# Patient Record
Sex: MALE | Race: WHITE | NOT HISPANIC OR LATINO | Employment: OTHER | URBAN - METROPOLITAN AREA
[De-identification: names, ages, dates, MRNs, and addresses within clinical notes are randomized per-mention and may not be internally consistent; named-entity substitution may affect disease eponyms.]

---

## 2020-06-14 ENCOUNTER — TRANSFERRED RECORDS (OUTPATIENT)
Dept: HEALTH INFORMATION MANAGEMENT | Facility: CLINIC | Age: 80
End: 2020-06-14

## 2020-06-15 LAB — EJECTION FRACTION: 60 %

## 2020-06-17 LAB — EJECTION FRACTION: 60 %

## 2020-07-10 ENCOUNTER — TRANSFERRED RECORDS (OUTPATIENT)
Dept: FAMILY MEDICINE | Facility: CLINIC | Age: 80
End: 2020-07-10

## 2020-07-31 ENCOUNTER — TRANSFERRED RECORDS (OUTPATIENT)
Dept: HEALTH INFORMATION MANAGEMENT | Facility: CLINIC | Age: 80
End: 2020-07-31

## 2020-08-07 ENCOUNTER — TRANSFERRED RECORDS (OUTPATIENT)
Dept: FAMILY MEDICINE | Facility: CLINIC | Age: 80
End: 2020-08-07

## 2020-08-19 ENCOUNTER — TRANSFERRED RECORDS (OUTPATIENT)
Dept: FAMILY MEDICINE | Facility: CLINIC | Age: 80
End: 2020-08-19

## 2020-09-08 ENCOUNTER — TRANSFERRED RECORDS (OUTPATIENT)
Dept: FAMILY MEDICINE | Facility: CLINIC | Age: 80
End: 2020-09-08

## 2020-09-09 LAB — EJECTION FRACTION: 60 %

## 2020-10-19 ENCOUNTER — MEDICAL CORRESPONDENCE (OUTPATIENT)
Dept: HEALTH INFORMATION MANAGEMENT | Facility: CLINIC | Age: 80
End: 2020-10-19

## 2020-10-19 ENCOUNTER — TRANSFERRED RECORDS (OUTPATIENT)
Dept: HEALTH INFORMATION MANAGEMENT | Facility: CLINIC | Age: 80
End: 2020-10-19

## 2020-10-19 ENCOUNTER — TRANSFERRED RECORDS (OUTPATIENT)
Dept: SLEEP MEDICINE | Facility: CLINIC | Age: 80
End: 2020-10-19

## 2020-10-19 LAB
EJECTION FRACTION: 55 %

## 2021-03-19 ENCOUNTER — RECORDS - HEALTHEAST (OUTPATIENT)
Dept: LAB | Facility: CLINIC | Age: 81
End: 2021-03-19

## 2021-03-22 LAB
25(OH)D3 SERPL-MCNC: 24.2 NG/ML (ref 30–80)
ALBUMIN SERPL-MCNC: 3.3 G/DL (ref 3.5–5)
ALP SERPL-CCNC: 99 U/L (ref 45–120)
ALT SERPL W P-5'-P-CCNC: 19 U/L (ref 0–45)
ANION GAP SERPL CALCULATED.3IONS-SCNC: 7 MMOL/L (ref 5–18)
AST SERPL W P-5'-P-CCNC: 28 U/L (ref 0–40)
BASOPHILS # BLD AUTO: 0 THOU/UL (ref 0–0.2)
BASOPHILS NFR BLD AUTO: 1 % (ref 0–2)
BILIRUB DIRECT SERPL-MCNC: 0.2 MG/DL
BILIRUB SERPL-MCNC: 0.4 MG/DL (ref 0–1)
BUN SERPL-MCNC: 14 MG/DL (ref 8–28)
CALCIUM SERPL-MCNC: 9.4 MG/DL (ref 8.5–10.5)
CHLORIDE BLD-SCNC: 105 MMOL/L (ref 98–107)
CHOLEST SERPL-MCNC: 138 MG/DL
CO2 SERPL-SCNC: 29 MMOL/L (ref 22–31)
CREAT SERPL-MCNC: 0.69 MG/DL (ref 0.7–1.3)
EOSINOPHIL # BLD AUTO: 0.1 THOU/UL (ref 0–0.4)
EOSINOPHIL NFR BLD AUTO: 3 % (ref 0–6)
ERYTHROCYTE [DISTWIDTH] IN BLOOD BY AUTOMATED COUNT: 14.5 % (ref 11–14.5)
FASTING STATUS PATIENT QL REPORTED: NORMAL
GFR SERPL CREATININE-BSD FRML MDRD: >60 ML/MIN/1.73M2
GLUCOSE BLD-MCNC: 93 MG/DL (ref 70–125)
HCT VFR BLD AUTO: 38.1 % (ref 40–54)
HDLC SERPL-MCNC: 49 MG/DL
HGB BLD-MCNC: 11.8 G/DL (ref 14–18)
IMM GRANULOCYTES # BLD: 0 THOU/UL
IMM GRANULOCYTES NFR BLD: 0 %
LDLC SERPL CALC-MCNC: 74 MG/DL
LYMPHOCYTES # BLD AUTO: 0.7 THOU/UL (ref 0.8–4.4)
LYMPHOCYTES NFR BLD AUTO: 16 % (ref 20–40)
MCH RBC QN AUTO: 29 PG (ref 27–34)
MCHC RBC AUTO-ENTMCNC: 31 G/DL (ref 32–36)
MCV RBC AUTO: 94 FL (ref 80–100)
MONOCYTES # BLD AUTO: 0.4 THOU/UL (ref 0–0.9)
MONOCYTES NFR BLD AUTO: 9 % (ref 2–10)
NEUTROPHILS # BLD AUTO: 2.9 THOU/UL (ref 2–7.7)
NEUTROPHILS NFR BLD AUTO: 71 % (ref 50–70)
PLATELET # BLD AUTO: 233 THOU/UL (ref 140–440)
PMV BLD AUTO: 9.7 FL (ref 8.5–12.5)
POTASSIUM BLD-SCNC: 4 MMOL/L (ref 3.5–5)
PROT SERPL-MCNC: 7 G/DL (ref 6–8)
RBC # BLD AUTO: 4.07 MILL/UL (ref 4.4–6.2)
SODIUM SERPL-SCNC: 141 MMOL/L (ref 136–145)
TRIGL SERPL-MCNC: 76 MG/DL
TSH SERPL DL<=0.005 MIU/L-ACNC: 5.2 UIU/ML (ref 0.3–5)
WBC: 4 THOU/UL (ref 4–11)

## 2021-07-04 ENCOUNTER — RECORDS - HEALTHEAST (OUTPATIENT)
Dept: LAB | Facility: CLINIC | Age: 81
End: 2021-07-04

## 2021-07-06 LAB
ANION GAP SERPL CALCULATED.3IONS-SCNC: 8 MMOL/L (ref 5–18)
BUN SERPL-MCNC: 15 MG/DL (ref 8–28)
CALCIUM SERPL-MCNC: 9.7 MG/DL (ref 8.5–10.5)
CHLORIDE BLD-SCNC: 103 MMOL/L (ref 98–107)
CO2 SERPL-SCNC: 28 MMOL/L (ref 22–31)
CREAT SERPL-MCNC: 0.81 MG/DL (ref 0.7–1.3)
ERYTHROCYTE [DISTWIDTH] IN BLOOD BY AUTOMATED COUNT: 15.2 % (ref 11–14.5)
GFR SERPL CREATININE-BSD FRML MDRD: >60 ML/MIN/1.73M2
GLUCOSE BLD-MCNC: 81 MG/DL (ref 70–125)
HCT VFR BLD AUTO: 37.3 % (ref 40–54)
HGB BLD-MCNC: 11.6 G/DL (ref 14–18)
MCH RBC QN AUTO: 29.7 PG (ref 27–34)
MCHC RBC AUTO-ENTMCNC: 31.1 G/DL (ref 32–36)
MCV RBC AUTO: 95 FL (ref 80–100)
PLATELET # BLD AUTO: 192 THOU/UL (ref 140–440)
PMV BLD AUTO: 9.9 FL (ref 8.5–12.5)
POTASSIUM BLD-SCNC: 4.2 MMOL/L (ref 3.5–5)
RBC # BLD AUTO: 3.91 MILL/UL (ref 4.4–6.2)
SODIUM SERPL-SCNC: 139 MMOL/L (ref 136–145)
WBC: 4.5 THOU/UL (ref 4–11)

## 2021-07-12 ENCOUNTER — LAB REQUISITION (OUTPATIENT)
Dept: LAB | Facility: CLINIC | Age: 81
End: 2021-07-12
Payer: MEDICARE

## 2021-07-12 DIAGNOSIS — D64.9 ANEMIA, UNSPECIFIED: ICD-10-CM

## 2021-07-12 DIAGNOSIS — N18.9 CHRONIC KIDNEY DISEASE, UNSPECIFIED: ICD-10-CM

## 2021-07-12 DIAGNOSIS — I10 ESSENTIAL (PRIMARY) HYPERTENSION: ICD-10-CM

## 2021-07-13 LAB
ERYTHROCYTE [DISTWIDTH] IN BLOOD BY AUTOMATED COUNT: 15.1 % (ref 10–15)
HCT VFR BLD AUTO: 36.4 % (ref 40–53)
HGB BLD-MCNC: 11.3 G/DL (ref 13.3–17.7)
MCH RBC QN AUTO: 29.5 PG (ref 26.5–33)
MCHC RBC AUTO-ENTMCNC: 31 G/DL (ref 31.5–36.5)
MCV RBC AUTO: 95 FL (ref 78–100)
PLATELET # BLD AUTO: 180 10E3/UL (ref 150–450)
RBC # BLD AUTO: 3.83 10E6/UL (ref 4.4–5.9)
WBC # BLD AUTO: 4.3 10E3/UL (ref 4–11)

## 2021-07-13 PROCEDURE — P9604 ONE-WAY ALLOW PRORATED TRIP: HCPCS | Mod: ORL | Performed by: INTERNAL MEDICINE

## 2021-07-13 PROCEDURE — 85027 COMPLETE CBC AUTOMATED: CPT | Mod: ORL | Performed by: INTERNAL MEDICINE

## 2021-07-13 PROCEDURE — 36415 COLL VENOUS BLD VENIPUNCTURE: CPT | Mod: ORL | Performed by: INTERNAL MEDICINE

## 2021-07-14 ENCOUNTER — LAB REQUISITION (OUTPATIENT)
Dept: LAB | Facility: CLINIC | Age: 81
End: 2021-07-14
Payer: MEDICARE

## 2021-07-14 DIAGNOSIS — D64.9 ANEMIA, UNSPECIFIED: ICD-10-CM

## 2021-07-14 DIAGNOSIS — E03.9 HYPOTHYROIDISM, UNSPECIFIED: ICD-10-CM

## 2021-07-14 DIAGNOSIS — N18.9 CHRONIC KIDNEY DISEASE, UNSPECIFIED: ICD-10-CM

## 2021-07-14 DIAGNOSIS — I10 ESSENTIAL (PRIMARY) HYPERTENSION: ICD-10-CM

## 2021-07-15 PROCEDURE — P9604 ONE-WAY ALLOW PRORATED TRIP: HCPCS | Mod: ORL | Performed by: INTERNAL MEDICINE

## 2021-07-16 LAB
ALBUMIN SERPL-MCNC: 3.3 G/DL (ref 3.5–5)
ALP SERPL-CCNC: 108 U/L (ref 45–120)
ALT SERPL W P-5'-P-CCNC: 18 U/L (ref 0–45)
ANION GAP SERPL CALCULATED.3IONS-SCNC: 8 MMOL/L (ref 5–18)
AST SERPL W P-5'-P-CCNC: 26 U/L (ref 0–40)
BASOPHILS # BLD AUTO: 0 10E3/UL (ref 0–0.2)
BASOPHILS NFR BLD AUTO: 1 %
BILIRUB DIRECT SERPL-MCNC: 0.3 MG/DL
BILIRUB SERPL-MCNC: 0.6 MG/DL (ref 0–1)
BUN SERPL-MCNC: 14 MG/DL (ref 8–28)
CALCIUM SERPL-MCNC: 9.8 MG/DL (ref 8.5–10.5)
CHLORIDE BLD-SCNC: 103 MMOL/L (ref 98–107)
CO2 SERPL-SCNC: 29 MMOL/L (ref 22–31)
CREAT SERPL-MCNC: 0.73 MG/DL (ref 0.7–1.3)
EOSINOPHIL # BLD AUTO: 0.1 10E3/UL (ref 0–0.7)
EOSINOPHIL NFR BLD AUTO: 2 %
ERYTHROCYTE [DISTWIDTH] IN BLOOD BY AUTOMATED COUNT: 15 % (ref 10–15)
GFR SERPL CREATININE-BSD FRML MDRD: 86 ML/MIN/1.73M2
GLUCOSE BLD-MCNC: 101 MG/DL (ref 70–125)
HCT VFR BLD AUTO: 38.3 % (ref 40–53)
HGB BLD-MCNC: 12 G/DL (ref 13.3–17.7)
IMM GRANULOCYTES # BLD: 0 10E3/UL
IMM GRANULOCYTES NFR BLD: 0 %
LYMPHOCYTES # BLD AUTO: 1 10E3/UL (ref 0.8–5.3)
LYMPHOCYTES NFR BLD AUTO: 23 %
MCH RBC QN AUTO: 30.1 PG (ref 26.5–33)
MCHC RBC AUTO-ENTMCNC: 31.3 G/DL (ref 31.5–36.5)
MCV RBC AUTO: 96 FL (ref 78–100)
MONOCYTES # BLD AUTO: 0.6 10E3/UL (ref 0–1.3)
MONOCYTES NFR BLD AUTO: 13 %
NEUTROPHILS # BLD AUTO: 2.8 10E3/UL (ref 1.6–8.3)
NEUTROPHILS NFR BLD AUTO: 61 %
NRBC # BLD AUTO: 0 10E3/UL
NRBC BLD AUTO-RTO: 0 /100
PLATELET # BLD AUTO: 194 10E3/UL (ref 150–450)
POTASSIUM BLD-SCNC: 4.3 MMOL/L (ref 3.5–5)
PROT SERPL-MCNC: 6.7 G/DL (ref 6–8)
RBC # BLD AUTO: 3.99 10E6/UL (ref 4.4–5.9)
SODIUM SERPL-SCNC: 140 MMOL/L (ref 136–145)
TSH SERPL DL<=0.005 MIU/L-ACNC: 3.66 UIU/ML (ref 0.3–5)
WBC # BLD AUTO: 4.5 10E3/UL (ref 4–11)

## 2021-07-16 PROCEDURE — 84443 ASSAY THYROID STIM HORMONE: CPT | Mod: ORL | Performed by: INTERNAL MEDICINE

## 2021-07-16 PROCEDURE — 36415 COLL VENOUS BLD VENIPUNCTURE: CPT | Mod: ORL | Performed by: INTERNAL MEDICINE

## 2021-07-16 PROCEDURE — 36415 COLL VENOUS BLD VENIPUNCTURE: CPT | Mod: ORL

## 2021-07-16 PROCEDURE — 82248 BILIRUBIN DIRECT: CPT | Mod: ORL | Performed by: INTERNAL MEDICINE

## 2021-07-16 PROCEDURE — 85025 COMPLETE CBC W/AUTO DIFF WBC: CPT | Mod: ORL | Performed by: INTERNAL MEDICINE

## 2021-07-16 PROCEDURE — P9604 ONE-WAY ALLOW PRORATED TRIP: HCPCS | Mod: ORL | Performed by: INTERNAL MEDICINE

## 2021-07-16 PROCEDURE — 80053 COMPREHEN METABOLIC PANEL: CPT | Mod: ORL | Performed by: INTERNAL MEDICINE

## 2021-08-20 ENCOUNTER — LAB REQUISITION (OUTPATIENT)
Dept: LAB | Facility: CLINIC | Age: 81
End: 2021-08-20
Payer: MEDICARE

## 2021-08-20 DIAGNOSIS — E55.9 VITAMIN D DEFICIENCY, UNSPECIFIED: ICD-10-CM

## 2021-08-23 PROCEDURE — 36415 COLL VENOUS BLD VENIPUNCTURE: CPT | Mod: ORL | Performed by: NURSE PRACTITIONER

## 2021-08-23 PROCEDURE — 82306 VITAMIN D 25 HYDROXY: CPT | Mod: ORL | Performed by: NURSE PRACTITIONER

## 2021-08-23 PROCEDURE — P9603 ONE-WAY ALLOW PRORATED MILES: HCPCS | Mod: ORL | Performed by: NURSE PRACTITIONER

## 2021-08-24 LAB — DEPRECATED CALCIDIOL+CALCIFEROL SERPL-MC: 51 UG/L (ref 30–80)

## 2022-01-03 ENCOUNTER — LAB REQUISITION (OUTPATIENT)
Dept: LAB | Facility: CLINIC | Age: 82
End: 2022-01-03
Payer: MEDICARE

## 2022-01-03 DIAGNOSIS — I10 ESSENTIAL (PRIMARY) HYPERTENSION: ICD-10-CM

## 2022-01-05 ENCOUNTER — LAB REQUISITION (OUTPATIENT)
Dept: LAB | Facility: CLINIC | Age: 82
End: 2022-01-05
Payer: MEDICARE

## 2022-01-05 DIAGNOSIS — I10 ESSENTIAL (PRIMARY) HYPERTENSION: ICD-10-CM

## 2022-01-05 LAB
ANION GAP SERPL CALCULATED.3IONS-SCNC: 6 MMOL/L (ref 5–18)
BUN SERPL-MCNC: 23 MG/DL (ref 8–28)
CALCIUM SERPL-MCNC: 9.8 MG/DL (ref 8.5–10.5)
CHLORIDE BLD-SCNC: 104 MMOL/L (ref 98–107)
CO2 SERPL-SCNC: 28 MMOL/L (ref 22–31)
CREAT SERPL-MCNC: 0.92 MG/DL (ref 0.7–1.3)
GFR SERPL CREATININE-BSD FRML MDRD: 83 ML/MIN/1.73M2
GLUCOSE BLD-MCNC: 100 MG/DL (ref 70–125)
POTASSIUM BLD-SCNC: 5 MMOL/L (ref 3.5–5)
SODIUM SERPL-SCNC: 138 MMOL/L (ref 136–145)

## 2022-01-05 PROCEDURE — 36415 COLL VENOUS BLD VENIPUNCTURE: CPT | Mod: ORL | Performed by: INTERNAL MEDICINE

## 2022-01-05 PROCEDURE — 80048 BASIC METABOLIC PNL TOTAL CA: CPT | Mod: ORL | Performed by: INTERNAL MEDICINE

## 2022-01-05 PROCEDURE — P9604 ONE-WAY ALLOW PRORATED TRIP: HCPCS | Mod: ORL | Performed by: INTERNAL MEDICINE

## 2022-02-09 ENCOUNTER — DOCUMENTATION ONLY (OUTPATIENT)
Dept: OTHER | Facility: CLINIC | Age: 82
End: 2022-02-09

## 2022-03-21 ENCOUNTER — OFFICE VISIT (OUTPATIENT)
Dept: NEUROLOGY | Facility: CLINIC | Age: 82
End: 2022-03-21
Payer: MEDICARE

## 2022-03-21 VITALS
BODY MASS INDEX: 29.82 KG/M2 | HEIGHT: 71 IN | HEART RATE: 58 BPM | WEIGHT: 213 LBS | DIASTOLIC BLOOD PRESSURE: 59 MMHG | SYSTOLIC BLOOD PRESSURE: 134 MMHG

## 2022-03-21 DIAGNOSIS — Z87.820 HX OF TRAUMATIC BRAIN INJURY: ICD-10-CM

## 2022-03-21 DIAGNOSIS — R41.89 SUBJECTIVE MEMORY COMPLAINTS: Primary | ICD-10-CM

## 2022-03-21 PROCEDURE — 99205 OFFICE O/P NEW HI 60 MIN: CPT | Performed by: PSYCHIATRY & NEUROLOGY

## 2022-03-21 RX ORDER — NITROGLYCERIN 0.4 MG/1
0.4 TABLET SUBLINGUAL EVERY 5 MIN PRN
COMMUNITY
Start: 2021-04-23 | End: 2023-01-15

## 2022-03-21 RX ORDER — OXYCODONE 9 MG/1
CAPSULE, EXTENDED RELEASE ORAL
COMMUNITY
Start: 2022-03-18 | End: 2022-07-07

## 2022-03-21 RX ORDER — FUROSEMIDE 20 MG
20 TABLET ORAL DAILY
COMMUNITY
Start: 2022-03-14 | End: 2022-07-07

## 2022-03-21 RX ORDER — LANOLIN ALCOHOL/MO/W.PET/CERES
3 CREAM (GRAM) TOPICAL AT BEDTIME
COMMUNITY
Start: 2022-03-14 | End: 2022-07-27

## 2022-03-21 RX ORDER — NYSTATIN 100000 [USP'U]/G
POWDER TOPICAL 2 TIMES DAILY
COMMUNITY
Start: 2021-09-27

## 2022-03-21 RX ORDER — OLMESARTAN MEDOXOMIL 20 MG/1
20 TABLET ORAL DAILY
COMMUNITY
Start: 2022-03-14 | End: 2022-07-07

## 2022-03-21 RX ORDER — CHOLECALCIFEROL (VITAMIN D3) 50 MCG
2 TABLET ORAL DAILY
COMMUNITY
Start: 2022-03-14 | End: 2023-11-17

## 2022-03-21 RX ORDER — TRAZODONE HYDROCHLORIDE 100 MG/1
100 TABLET ORAL AT BEDTIME
COMMUNITY
Start: 2022-03-14 | End: 2023-08-02

## 2022-03-21 RX ORDER — OXYCODONE 13.5 MG/1
CAPSULE, EXTENDED RELEASE ORAL
COMMUNITY
Start: 2022-03-18 | End: 2022-07-08

## 2022-03-21 RX ORDER — ATORVASTATIN CALCIUM 40 MG/1
60 TABLET, FILM COATED ORAL DAILY
COMMUNITY
Start: 2022-03-14 | End: 2022-09-29

## 2022-03-21 RX ORDER — LEVOTHYROXINE SODIUM 50 UG/1
50 TABLET ORAL DAILY
COMMUNITY
Start: 2022-03-14 | End: 2022-07-07

## 2022-03-21 RX ORDER — CLOPIDOGREL BISULFATE 75 MG/1
75 TABLET ORAL DAILY
COMMUNITY
Start: 2022-03-14 | End: 2022-07-18

## 2022-03-21 RX ORDER — BUSPIRONE HYDROCHLORIDE 7.5 MG/1
TABLET ORAL
COMMUNITY
Start: 2021-08-08 | End: 2022-05-16

## 2022-03-21 ASSESSMENT — MONTREAL COGNITIVE ASSESSMENT (MOCA)
WHAT LEVEL OF EDUCATION WAS ATTAINED: 0
12. MEMORY INDEX SCORE: 0
13. ORIENTATION SUBSCORE: 3
4. NAME EACH OF THE THREE ANIMALS SHOWN: 3
8. SERIAL SUBTRACTION OF 7S: 1
11. FOR EACH PAIR OF WORDS, WHAT CATEGORY DO THEY BELONG TO (OUT OF 2): 2
7. [VIGILENCE] TAP WHEN HEARING DESIGNATED LETTER: 1
10. [FLUENCY] NAME WORDS STARTING WITH DESIGNATED LETTER: 1
6. READ LIST OF DIGITS [FORWARD/BACKWARD]: 2
9. REPEAT EACH SENTENCE: 2
WHAT IS THE TOTAL SCORE (OUT OF 30): 18
VISUOSPATIAL/EXECUTIVE SUBSCORE: 3

## 2022-03-21 NOTE — NURSING NOTE
ENRRIQUE COGNITIVE ASSESSMENT (MOCA)  Version 7.1 Original Version  VISUOSPATIAL/EXECUTIVE               COPY CUBE      [    ]                                [ 1   ] DRAW CLOCK (Ten past eleven)  (3 points)    [    ]                    [   1 ]               [  1  ]       Contour            Numbers     Hands POINTS                   3/ 5   NAMING    [ 1  ]                                                                        [  1  ]                                             [  1  ]  Lidave Villarreal                                Camel                   3  / 3   MEMORY Read list of words, subject must repeat them. Do 2 trials, even if 1st trial is successful. Do a recall after 5 minutes  FACE VELVET Bahai ROSALIO RED No Points    1st          2nd         ATTENTION Read list of digits (1 digit/sec) Subject has to repeat in the forward order       [ 1   ]   2  1  8  5  4                                [  1  ] 7 4 2                          2/2   Read list of letters. The subject must tap with his hand at each letter A. No points if > 2 errors.  [    ] F B A C M N A A J K L B A F A K D E A A A J A M O F A A B             1 /1   Serial 7 subtraction starting at 100          [  1  ] 93         [    ] 86          [    ] 79          [    ] 72         [    ] 65   4 or 5 correct subtractions: 3 points,  2 or 3 correct: 2 points,  1correct: 1 point,   0 correct: 0 points           1 /3   LANGUAGE Repeat: I only know that Ramón is the one to help today. [ 1    ]                                      The cat always hid under the couch when dogs were in the room. [  1 ]              2 /2   Fluency: Name maximum number of words in one minute that begin with the letter F                                                                                                                    [    ] 12___ (N > 11 words)             1  /1   ABSTRACTION Similarity between  e.g. banana-orange=fruit                                                                   [1    ] train-bicycle                      [  1  ] watch-ruler            2  /2   DELAYED  RECALL Has to recall words  WITH NO CUE FACE  [    ] VELVET  [    ] Mandaeism  [    ]  ROSALIO  [    ] RED  [    ] Points for UNCUED recall only          0  /5           OPTIONAL Category cue           Multiple choice cue          ORIENTATION  [    ] Date     [ 1   ] Month       [  1  ] Year      [  1  ] Day      [    ] Place        [    ] City        3  /6   TOTAL  Normal > 26/30 Add 1 point if < 12 years education     18 /30
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
## 2022-03-25 ENCOUNTER — LAB REQUISITION (OUTPATIENT)
Dept: LAB | Facility: CLINIC | Age: 82
End: 2022-03-25
Payer: MEDICARE

## 2022-03-25 DIAGNOSIS — R41.89 OTHER SYMPTOMS AND SIGNS INVOLVING COGNITIVE FUNCTIONS AND AWARENESS: ICD-10-CM

## 2022-03-25 DIAGNOSIS — Z87.820 PERSONAL HISTORY OF TRAUMATIC BRAIN INJURY: ICD-10-CM

## 2022-03-28 LAB
T4 FREE SERPL-MCNC: 0.84 NG/DL (ref 0.7–1.8)
TSH SERPL DL<=0.005 MIU/L-ACNC: 4.09 UIU/ML (ref 0.3–5)
VIT B12 SERPL-MCNC: 580 PG/ML (ref 213–816)

## 2022-03-28 PROCEDURE — 84443 ASSAY THYROID STIM HORMONE: CPT | Mod: ORL | Performed by: PSYCHIATRY & NEUROLOGY

## 2022-03-28 PROCEDURE — 84439 ASSAY OF FREE THYROXINE: CPT | Mod: ORL | Performed by: PSYCHIATRY & NEUROLOGY

## 2022-03-28 PROCEDURE — P9603 ONE-WAY ALLOW PRORATED MILES: HCPCS | Mod: ORL | Performed by: PSYCHIATRY & NEUROLOGY

## 2022-03-28 PROCEDURE — 36415 COLL VENOUS BLD VENIPUNCTURE: CPT | Mod: ORL | Performed by: PSYCHIATRY & NEUROLOGY

## 2022-03-28 PROCEDURE — 82607 VITAMIN B-12: CPT | Mod: ORL | Performed by: PSYCHIATRY & NEUROLOGY

## 2022-03-29 ENCOUNTER — TELEPHONE (OUTPATIENT)
Dept: NEUROLOGY | Facility: CLINIC | Age: 82
End: 2022-03-29
Payer: MEDICARE

## 2022-03-30 NOTE — TELEPHONE ENCOUNTER
Spoke to son Santo and he will have his dad go to a  [facility] or [facility] to have remaining labs done.  Dayan Soriano MA on 3/30/2022 at 10:07 AM

## 2022-03-31 ENCOUNTER — LAB (OUTPATIENT)
Dept: LAB | Facility: CLINIC | Age: 82
End: 2022-03-31
Payer: MEDICARE

## 2022-03-31 DIAGNOSIS — R41.89 SUBJECTIVE MEMORY COMPLAINTS: ICD-10-CM

## 2022-03-31 DIAGNOSIS — Z87.820 HX OF TRAUMATIC BRAIN INJURY: ICD-10-CM

## 2022-03-31 LAB
T4 FREE SERPL-MCNC: 0.88 NG/DL (ref 0.76–1.46)
TSH SERPL DL<=0.005 MIU/L-ACNC: 4.5 MU/L (ref 0.4–4)
VIT B12 SERPL-MCNC: 628 PG/ML (ref 193–986)

## 2022-03-31 PROCEDURE — 83921 ORGANIC ACID SINGLE QUANT: CPT

## 2022-03-31 PROCEDURE — 36415 COLL VENOUS BLD VENIPUNCTURE: CPT

## 2022-03-31 PROCEDURE — 82607 VITAMIN B-12: CPT

## 2022-03-31 PROCEDURE — 84425 ASSAY OF VITAMIN B-1: CPT | Mod: 90

## 2022-03-31 PROCEDURE — 84439 ASSAY OF FREE THYROXINE: CPT

## 2022-03-31 PROCEDURE — 84443 ASSAY THYROID STIM HORMONE: CPT

## 2022-04-05 LAB — VIT B1 PYROPHOSHATE BLD-SCNC: 126 NMOL/L

## 2022-04-06 LAB — METHYLMALONATE SERPL-SCNC: 0.29 UMOL/L (ref 0–0.4)

## 2022-04-19 ENCOUNTER — LAB REQUISITION (OUTPATIENT)
Dept: LAB | Facility: CLINIC | Age: 82
End: 2022-04-19
Payer: MEDICARE

## 2022-04-19 DIAGNOSIS — Z95.2 PRESENCE OF PROSTHETIC HEART VALVE: ICD-10-CM

## 2022-04-19 DIAGNOSIS — Z95.1 PRESENCE OF AORTOCORONARY BYPASS GRAFT: ICD-10-CM

## 2022-04-21 LAB
ALBUMIN SERPL-MCNC: 3.5 G/DL (ref 3.5–5)
ALP SERPL-CCNC: 85 U/L (ref 45–120)
ALT SERPL W P-5'-P-CCNC: 19 U/L (ref 0–45)
ANION GAP SERPL CALCULATED.3IONS-SCNC: 10 MMOL/L (ref 5–18)
AST SERPL W P-5'-P-CCNC: 25 U/L (ref 0–40)
BILIRUB DIRECT SERPL-MCNC: 0.2 MG/DL
BILIRUB SERPL-MCNC: 0.6 MG/DL (ref 0–1)
BUN SERPL-MCNC: 18 MG/DL (ref 8–28)
CALCIUM SERPL-MCNC: 9.7 MG/DL (ref 8.5–10.5)
CHLORIDE BLD-SCNC: 102 MMOL/L (ref 98–107)
CO2 SERPL-SCNC: 29 MMOL/L (ref 22–31)
CREAT SERPL-MCNC: 0.85 MG/DL (ref 0.7–1.3)
DEPRECATED CALCIDIOL+CALCIFEROL SERPL-MC: 50 UG/L (ref 20–75)
ERYTHROCYTE [DISTWIDTH] IN BLOOD BY AUTOMATED COUNT: 14.4 % (ref 10–15)
GFR SERPL CREATININE-BSD FRML MDRD: 86 ML/MIN/1.73M2
GLUCOSE BLD-MCNC: 125 MG/DL (ref 70–125)
HCT VFR BLD AUTO: 44.8 % (ref 40–53)
HGB BLD-MCNC: 14 G/DL (ref 13.3–17.7)
MCH RBC QN AUTO: 32 PG (ref 26.5–33)
MCHC RBC AUTO-ENTMCNC: 31.3 G/DL (ref 31.5–36.5)
MCV RBC AUTO: 102 FL (ref 78–100)
PLATELET # BLD AUTO: 198 10E3/UL (ref 150–450)
POTASSIUM BLD-SCNC: 3.9 MMOL/L (ref 3.5–5)
PROT SERPL-MCNC: 6.8 G/DL (ref 6–8)
RBC # BLD AUTO: 4.38 10E6/UL (ref 4.4–5.9)
SODIUM SERPL-SCNC: 141 MMOL/L (ref 136–145)
TSH SERPL DL<=0.005 MIU/L-ACNC: 3.76 UIU/ML (ref 0.3–5)
WBC # BLD AUTO: 6.1 10E3/UL (ref 4–11)

## 2022-04-21 PROCEDURE — 36415 COLL VENOUS BLD VENIPUNCTURE: CPT | Mod: ORL | Performed by: NURSE PRACTITIONER

## 2022-04-21 PROCEDURE — 84443 ASSAY THYROID STIM HORMONE: CPT | Mod: ORL | Performed by: NURSE PRACTITIONER

## 2022-04-21 PROCEDURE — 85027 COMPLETE CBC AUTOMATED: CPT | Mod: ORL | Performed by: NURSE PRACTITIONER

## 2022-04-21 PROCEDURE — 82306 VITAMIN D 25 HYDROXY: CPT | Mod: ORL | Performed by: NURSE PRACTITIONER

## 2022-04-21 PROCEDURE — 82248 BILIRUBIN DIRECT: CPT | Mod: ORL | Performed by: NURSE PRACTITIONER

## 2022-04-21 PROCEDURE — P9604 ONE-WAY ALLOW PRORATED TRIP: HCPCS | Mod: ORL | Performed by: NURSE PRACTITIONER

## 2022-04-23 ENCOUNTER — HOSPITAL ENCOUNTER (OUTPATIENT)
Dept: MRI IMAGING | Facility: HOSPITAL | Age: 82
Discharge: HOME | End: 2022-04-23
Attending: PSYCHIATRY & NEUROLOGY | Admitting: PSYCHIATRY & NEUROLOGY
Payer: MEDICARE

## 2022-04-23 DIAGNOSIS — Z87.820 HX OF TRAUMATIC BRAIN INJURY: ICD-10-CM

## 2022-04-23 DIAGNOSIS — R41.89 SUBJECTIVE MEMORY COMPLAINTS: ICD-10-CM

## 2022-04-23 PROCEDURE — 70551 MRI BRAIN STEM W/O DYE: CPT

## 2022-04-28 ENCOUNTER — TELEPHONE (OUTPATIENT)
Dept: NEUROLOGY | Facility: CLINIC | Age: 82
End: 2022-04-28
Payer: MEDICARE

## 2022-04-28 DIAGNOSIS — I63.9 CEREBROVASCULAR ACCIDENT (CVA), UNSPECIFIED MECHANISM (H): Primary | ICD-10-CM

## 2022-04-28 RX ORDER — ASPIRIN 81 MG/1
81 TABLET, CHEWABLE ORAL DAILY
Qty: 90 TABLET | Refills: 1 | Status: SHIPPED | OUTPATIENT
Start: 2022-04-28 | End: 2022-04-28

## 2022-04-28 RX ORDER — ASPIRIN 81 MG/1
81 TABLET, CHEWABLE ORAL DAILY
Qty: 90 TABLET | Refills: 1 | Status: SHIPPED | OUTPATIENT
Start: 2022-04-28

## 2022-04-28 NOTE — TELEPHONE ENCOUNTER
[facility] Call Center    Phone Message    May a detailed message be left on voicemail: yes     Reason for Call: Other: Pt long term care facility calling in the order needs to go to [facility] in [place], please resend order to correct location      Action Taken: Message routed to:  Other: [facility] neuro    Travel Screening: Not Applicable

## 2022-04-28 NOTE — TELEPHONE ENCOUNTER
Spoke to pt's son ans they will need and order for the pt to take his aspirin at his long term care facility. Can you send the aspirin to the pharmacy with an order.   Dayan Soriano MA on 4/28/2022 at 8:44 AM

## 2022-05-03 ENCOUNTER — OFFICE VISIT (OUTPATIENT)
Dept: FAMILY MEDICINE | Facility: CLINIC | Age: 82
End: 2022-05-03
Payer: MEDICARE

## 2022-05-03 VITALS
OXYGEN SATURATION: 96 % | BODY MASS INDEX: 30.24 KG/M2 | SYSTOLIC BLOOD PRESSURE: 122 MMHG | WEIGHT: 216 LBS | HEART RATE: 66 BPM | DIASTOLIC BLOOD PRESSURE: 60 MMHG | HEIGHT: 71 IN | TEMPERATURE: 98.3 F

## 2022-05-03 DIAGNOSIS — Z95.2 S/P TAVR (TRANSCATHETER AORTIC VALVE REPLACEMENT): ICD-10-CM

## 2022-05-03 DIAGNOSIS — I25.718 CORONARY ARTERY DISEASE OF AUTOLOGOUS VEIN BYPASS GRAFT WITH STABLE ANGINA PECTORIS (H): Primary | ICD-10-CM

## 2022-05-03 DIAGNOSIS — I20.89 STABLE ANGINA (H): ICD-10-CM

## 2022-05-03 DIAGNOSIS — Z95.5 PRESENCE OF CORONARY ARTERY BYPASS GRAFT STENT: ICD-10-CM

## 2022-05-03 DIAGNOSIS — E03.4 HYPOTHYROIDISM DUE TO ACQUIRED ATROPHY OF THYROID: ICD-10-CM

## 2022-05-03 DIAGNOSIS — F33.41 RECURRENT MAJOR DEPRESSIVE DISORDER, IN PARTIAL REMISSION (H): ICD-10-CM

## 2022-05-03 DIAGNOSIS — F11.90 CHRONIC, CONTINUOUS USE OF OPIOIDS: ICD-10-CM

## 2022-05-03 DIAGNOSIS — F41.1 GENERALIZED ANXIETY DISORDER: ICD-10-CM

## 2022-05-03 DIAGNOSIS — C61 MALIGNANT TUMOR OF PROSTATE (H): ICD-10-CM

## 2022-05-03 DIAGNOSIS — G47.33 OSA (OBSTRUCTIVE SLEEP APNEA): ICD-10-CM

## 2022-05-03 DIAGNOSIS — Z95.1 PRESENCE OF CORONARY ARTERY BYPASS GRAFT STENT: ICD-10-CM

## 2022-05-03 PROCEDURE — 99204 OFFICE O/P NEW MOD 45 MIN: CPT | Performed by: FAMILY MEDICINE

## 2022-05-03 RX ORDER — OXYCODONE HYDROCHLORIDE 5 MG/1
2.5 TABLET ORAL AT BEDTIME
COMMUNITY
Start: 2022-04-14 | End: 2022-07-07

## 2022-05-03 RX ORDER — LEVOTHYROXINE SODIUM 50 UG/1
50 TABLET ORAL
COMMUNITY
End: 2022-05-16

## 2022-05-03 RX ORDER — SERTRALINE HYDROCHLORIDE 100 MG/1
100 TABLET, FILM COATED ORAL DAILY
COMMUNITY
Start: 2021-09-29 | End: 2022-09-29

## 2022-05-03 RX ORDER — NYSTATIN 100000 [USP'U]/G
POWDER TOPICAL
COMMUNITY
Start: 2021-09-27 | End: 2022-05-16

## 2022-05-03 RX ORDER — NITROGLYCERIN 0.4 MG/1
0.4 TABLET SUBLINGUAL EVERY 5 MIN PRN
COMMUNITY
End: 2022-05-18

## 2022-05-03 RX ORDER — ISOSORBIDE MONONITRATE 60 MG/1
TABLET, EXTENDED RELEASE ORAL
COMMUNITY
End: 2022-05-16

## 2022-05-03 RX ORDER — ISOSORBIDE MONONITRATE 30 MG/1
TABLET, EXTENDED RELEASE ORAL
COMMUNITY
End: 2022-05-16
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
## 2022-05-06 PROBLEM — Z95.5 PRESENCE OF CORONARY ARTERY BYPASS GRAFT STENT: Status: ACTIVE | Noted: 2022-05-06

## 2022-05-06 PROBLEM — G47.33 OSA (OBSTRUCTIVE SLEEP APNEA): Status: ACTIVE | Noted: 2022-05-06

## 2022-05-06 PROBLEM — Z95.1 PRESENCE OF CORONARY ARTERY BYPASS GRAFT STENT: Status: ACTIVE | Noted: 2022-05-06

## 2022-05-06 PROBLEM — F33.41 RECURRENT MAJOR DEPRESSIVE DISORDER, IN PARTIAL REMISSION (H): Status: ACTIVE | Noted: 2022-05-06

## 2022-05-07 ENCOUNTER — HEALTH MAINTENANCE LETTER (OUTPATIENT)
Age: 82
End: 2022-05-07

## 2022-05-16 ENCOUNTER — HOSPITAL ENCOUNTER (EMERGENCY)
Facility: CLINIC | Age: 82
Discharge: HOME | End: 2022-05-16
Attending: FAMILY MEDICINE | Admitting: FAMILY MEDICINE
Payer: MEDICARE

## 2022-05-16 VITALS
HEIGHT: 71 IN | OXYGEN SATURATION: 93 % | DIASTOLIC BLOOD PRESSURE: 70 MMHG | BODY MASS INDEX: 30.24 KG/M2 | RESPIRATION RATE: 18 BRPM | WEIGHT: 216 LBS | SYSTOLIC BLOOD PRESSURE: 135 MMHG | HEART RATE: 62 BPM | TEMPERATURE: 97.1 F

## 2022-05-16 DIAGNOSIS — K92.2 LOWER GI BLEED: ICD-10-CM

## 2022-05-16 LAB
ABO/RH(D): NORMAL
ALBUMIN SERPL-MCNC: 3 G/DL (ref 3.4–5)
ALP SERPL-CCNC: 82 U/L (ref 40–150)
ALT SERPL W P-5'-P-CCNC: 25 U/L (ref 0–70)
ANION GAP SERPL CALCULATED.3IONS-SCNC: 7 MMOL/L (ref 3–14)
ANTIBODY SCREEN: NEGATIVE
APTT PPP: 28 SECONDS (ref 22–38)
AST SERPL W P-5'-P-CCNC: 22 U/L (ref 0–45)
BASOPHILS # BLD AUTO: 0 10E3/UL (ref 0–0.2)
BASOPHILS NFR BLD AUTO: 0 %
BILIRUB SERPL-MCNC: 0.4 MG/DL (ref 0.2–1.3)
BUN SERPL-MCNC: 29 MG/DL (ref 7–30)
CALCIUM SERPL-MCNC: 8.9 MG/DL (ref 8.5–10.1)
CHLORIDE BLD-SCNC: 108 MMOL/L (ref 94–109)
CO2 SERPL-SCNC: 30 MMOL/L (ref 20–32)
CREAT SERPL-MCNC: 0.94 MG/DL (ref 0.66–1.25)
EOSINOPHIL # BLD AUTO: 0.1 10E3/UL (ref 0–0.7)
EOSINOPHIL NFR BLD AUTO: 1 %
ERYTHROCYTE [DISTWIDTH] IN BLOOD BY AUTOMATED COUNT: 13.9 % (ref 10–15)
GFR SERPL CREATININE-BSD FRML MDRD: 80 ML/MIN/1.73M2
GLUCOSE BLD-MCNC: 100 MG/DL (ref 70–99)
HCT VFR BLD AUTO: 38.3 % (ref 40–53)
HGB BLD-MCNC: 12 G/DL (ref 13.3–17.7)
IMM GRANULOCYTES # BLD: 0 10E3/UL
IMM GRANULOCYTES NFR BLD: 0 %
INR PPP: 1.1 (ref 0.85–1.15)
LYMPHOCYTES # BLD AUTO: 1 10E3/UL (ref 0.8–5.3)
LYMPHOCYTES NFR BLD AUTO: 18 %
MCH RBC QN AUTO: 31.2 PG (ref 26.5–33)
MCHC RBC AUTO-ENTMCNC: 31.3 G/DL (ref 31.5–36.5)
MCV RBC AUTO: 100 FL (ref 78–100)
MONOCYTES # BLD AUTO: 0.6 10E3/UL (ref 0–1.3)
MONOCYTES NFR BLD AUTO: 10 %
NEUTROPHILS # BLD AUTO: 4 10E3/UL (ref 1.6–8.3)
NEUTROPHILS NFR BLD AUTO: 71 %
NRBC # BLD AUTO: 0 10E3/UL
NRBC BLD AUTO-RTO: 0 /100
PLATELET # BLD AUTO: 161 10E3/UL (ref 150–450)
POTASSIUM BLD-SCNC: 4.1 MMOL/L (ref 3.4–5.3)
PROT SERPL-MCNC: 6.1 G/DL (ref 6.8–8.8)
RBC # BLD AUTO: 3.85 10E6/UL (ref 4.4–5.9)
SODIUM SERPL-SCNC: 145 MMOL/L (ref 133–144)
SPECIMEN EXPIRATION DATE: NORMAL
WBC # BLD AUTO: 5.6 10E3/UL (ref 4–11)

## 2022-05-16 PROCEDURE — 82040 ASSAY OF SERUM ALBUMIN: CPT | Performed by: FAMILY MEDICINE

## 2022-05-16 PROCEDURE — 93005 ELECTROCARDIOGRAM TRACING: CPT | Performed by: FAMILY MEDICINE

## 2022-05-16 PROCEDURE — 93010 ELECTROCARDIOGRAM REPORT: CPT | Performed by: FAMILY MEDICINE

## 2022-05-16 PROCEDURE — 85610 PROTHROMBIN TIME: CPT | Performed by: FAMILY MEDICINE

## 2022-05-16 PROCEDURE — 85730 THROMBOPLASTIN TIME PARTIAL: CPT | Performed by: FAMILY MEDICINE

## 2022-05-16 PROCEDURE — 85025 COMPLETE CBC W/AUTO DIFF WBC: CPT | Performed by: FAMILY MEDICINE

## 2022-05-16 PROCEDURE — 99284 EMERGENCY DEPT VISIT MOD MDM: CPT | Mod: 25 | Performed by: FAMILY MEDICINE

## 2022-05-16 PROCEDURE — 86901 BLOOD TYPING SEROLOGIC RH(D): CPT | Performed by: FAMILY MEDICINE

## 2022-05-16 PROCEDURE — 99284 EMERGENCY DEPT VISIT MOD MDM: CPT | Performed by: FAMILY MEDICINE

## 2022-05-16 PROCEDURE — 36415 COLL VENOUS BLD VENIPUNCTURE: CPT | Performed by: FAMILY MEDICINE

## 2022-05-16 PROCEDURE — 86850 RBC ANTIBODY SCREEN: CPT | Performed by: FAMILY MEDICINE

## 2022-05-16 PROCEDURE — 80053 COMPREHEN METABOLIC PANEL: CPT | Performed by: FAMILY MEDICINE

## 2022-05-16 RX ORDER — BUSPIRONE HYDROCHLORIDE 15 MG/1
1 TABLET ORAL EVERY 8 HOURS
COMMUNITY
Start: 2022-05-09 | End: 2022-09-29

## 2022-05-16 RX ORDER — OXYCODONE HYDROCHLORIDE 5 MG/1
0.5 TABLET ORAL EVERY 6 HOURS PRN
COMMUNITY
Start: 2021-09-06 | End: 2022-05-18

## 2022-05-16 ASSESSMENT — ENCOUNTER SYMPTOMS
PALPITATIONS: 0
VOMITING: 0
CONSTIPATION: 1
NAUSEA: 0
SHORTNESS OF BREATH: 0
FREQUENCY: 0
SORE THROAT: 0
CHILLS: 0
BLOOD IN STOOL: 1
ABDOMINAL PAIN: 0
WHEEZING: 0
FEVER: 0
COUGH: 0
DIARRHEA: 0
HEADACHES: 0
DYSURIA: 0
SINUS PRESSURE: 0
DIAPHORESIS: 0

## 2022-05-17 NOTE — DISCHARGE INSTRUCTIONS
ICD-10-CM    1. Lower GI bleed  K92.2     recheck in 2-3 days.  follow-up colonoscopy, we will soften the stools.  double check med list - I do not see a anticoagulant but you have had a TAVR.  miralax 1 caful daily in 8 oz fluid for 1 week.  if by day 2-3 stools are still with straining. use mag citrate 150 ml (1/2 bottle) twice in one day and stay close to barhroom for 6 hours after last dose.  colonoscopy.  and return here for recurrent heavy bleeding

## 2022-05-17 NOTE — ED PROVIDER NOTES
History     Chief Complaint   Patient presents with     Rectal Bleeding     Started today     HPI  Sina Gomez is a 81 year old male who presents with a history of aspirin use, hypothyroidism, status post aortic valve replacements, who denies anticoagulation use.  Also with history of prostate cancer.  He presents today for an episode of large amount of blood in the toilet today after active lower GI bleed.  This is bright red.  No black stool.  No abdominal pain.  No fever.  No trauma.  Was straining at the stool last few days.  No prior GI bleeding.  Last colonoscopy 10 years ago.      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    Past Surgical History:    Past Surgical History:   Procedure Laterality Date     left hip replacement Left 05/03/1995       Family History:    No family history on file.    Social History:  Marital Status:   [2]  Social History     Tobacco Use     Smoking status: Never Smoker     Smokeless tobacco: Never Used   Substance Use Topics     Alcohol use: Never     Drug use: Never        Medications:    aspirin (ASA) 81 MG chewable tablet  atorvastatin  "(LIPITOR) 40 MG tablet  busPIRone (BUSPAR) 7.5 MG tablet  clopidogrel (PLAVIX) 75 MG tablet  diclofenac (VOLTAREN) 1 % topical gel  furosemide (LASIX) 20 MG tablet  isosorbide mononitrate (IMDUR) 30 MG 24 hr tablet  isosorbide mononitrate (IMDUR) 60 MG 24 hr tablet  levothyroxine (SYNTHROID/LEVOTHROID) 50 MCG tablet  levothyroxine (SYNTHROID/LEVOTHROID) 50 MCG tablet  melatonin 3 MG tablet  nitroGLYcerin (NITROSTAT) 0.4 MG sublingual tablet  nitroGLYcerin (NITROSTAT) 0.4 MG sublingual tablet  nystatin (MYCOSTATIN) 955598 UNIT/GM external powder  nystatin (MYCOSTATIN) 186848 UNIT/GM external powder  olmesartan (BENICAR) 20 MG tablet  oxyCODONE (ROXICODONE) 5 MG tablet  sertraline (ZOLOFT) 100 MG tablet  sertraline (ZOLOFT) 50 MG tablet  traZODone (DESYREL) 100 MG tablet  VITAMIN D3 50 MCG (2000 UT) tablet  XTAMPZA ER 13.5 MG 12 hr tablet  XTAMPZA ER 9 MG 12 hr tablet          Review of Systems   Constitutional: Negative for chills, diaphoresis and fever.   HENT: Negative for ear pain, sinus pressure and sore throat.    Eyes: Negative for visual disturbance.   Respiratory: Negative for cough, shortness of breath and wheezing.    Cardiovascular: Negative for chest pain and palpitations.   Gastrointestinal: Positive for blood in stool and constipation. Negative for abdominal pain, diarrhea, nausea and vomiting.   Genitourinary: Negative for dysuria, frequency and urgency.   Skin: Negative for rash.   Neurological: Negative for headaches.   All other systems reviewed and are negative.      Physical Exam   BP: 137/70  Pulse: 57  Temp: 97.1  F (36.2  C)  Resp: 18  Height: 180.3 cm (5' 11\")  Weight: 98 kg (216 lb) (stated)  SpO2: 93 %      Physical Exam  Constitutional:       General: He is in acute distress.      Appearance: He is not diaphoretic.   Eyes:      Conjunctiva/sclera: Conjunctivae normal.   Cardiovascular:      Rate and Rhythm: Normal rate and regular rhythm.      Heart sounds: No murmur heard.  Pulmonary:      " Effort: Pulmonary effort is normal. No respiratory distress.      Breath sounds: Normal breath sounds. No stridor. No wheezing or rhonchi.   Abdominal:      General: Abdomen is flat. There is no distension.      Palpations: There is no mass.      Tenderness: There is no abdominal tenderness. There is no guarding.   Musculoskeletal:      Cervical back: Neck supple.      Right lower leg: No edema.      Left lower leg: No edema.   Skin:     Coloration: Skin is not pale.      Findings: No rash.   Neurological:      General: No focal deficit present.      Mental Status: He is alert.      Motor: No weakness.       The rectal exam is without mass.  There is some clot did blood here and some residual bright red blood but no active bleeding.  Anoscopy without obvious findings of bleeding.  There may be some engorged hemorrhoids here difficult to tell.      ED Course                 Procedures                EKG Interpretation:      Interpreted by Randal Davis MD  EKG 1928 hrs. demonstrates a sinus rhythm 47 bpm normal axis.  No ST change.  No T wave changes.  Normal intervals.  Normal conduction except RSR' lead V1.  No ectopy.  Impression sinus rhythm at 47 bpm no significant acute change.    Critical Care time:  none               Results for orders placed or performed during the hospital encounter of 05/16/22 (from the past 24 hour(s))   CBC with platelets differential    Narrative    The following orders were created for panel order CBC with platelets differential.  Procedure                               Abnormality         Status                     ---------                               -----------         ------                     CBC with platelets and d...[[id number]]  Abnormal            Final result                 Please view results for these tests on the individual orders.   Comprehensive metabolic panel   Result Value Ref Range    Sodium 145 (H) 133 - 144 mmol/L    Potassium 4.1 3.4 - 5.3 mmol/L     Chloride 108 94 - 109 mmol/L    Carbon Dioxide (CO2) 30 20 - 32 mmol/L    Anion Gap 7 3 - 14 mmol/L    Urea Nitrogen 29 7 - 30 mg/dL    Creatinine 0.94 0.66 - 1.25 mg/dL    Calcium 8.9 8.5 - 10.1 mg/dL    Glucose 100 (H) 70 - 99 mg/dL    Alkaline Phosphatase 82 40 - 150 U/L    AST 22 0 - 45 U/L    ALT 25 0 - 70 U/L    Protein Total 6.1 (L) 6.8 - 8.8 g/dL    Albumin 3.0 (L) 3.4 - 5.0 g/dL    Bilirubin Total 0.4 0.2 - 1.3 mg/dL    GFR Estimate 80 >60 mL/min/1.73m2   INR   Result Value Ref Range    INR 1.10 0.85 - 1.15   Partial thromboplastin time   Result Value Ref Range    aPTT 28 22 - 38 Seconds   ABO/Rh type and screen    Narrative    The following orders were created for panel order ABO/Rh type and screen.  Procedure                               Abnormality         Status                     ---------                               -----------         ------                     Adult Type and Screen[[id number]]                            Final result                 Please view results for these tests on the individual orders.   CBC with platelets and differential   Result Value Ref Range    WBC Count 5.6 4.0 - 11.0 10e3/uL    RBC Count 3.85 (L) 4.40 - 5.90 10e6/uL    Hemoglobin 12.0 (L) 13.3 - 17.7 g/dL    Hematocrit 38.3 (L) 40.0 - 53.0 %     78 - 100 fL    MCH 31.2 26.5 - 33.0 pg    MCHC 31.3 (L) 31.5 - 36.5 g/dL    RDW 13.9 10.0 - 15.0 %    Platelet Count 161 150 - 450 10e3/uL    % Neutrophils 71 %    % Lymphocytes 18 %    % Monocytes 10 %    % Eosinophils 1 %    % Basophils 0 %    % Immature Granulocytes 0 %    NRBCs per 100 WBC 0 <1 /100    Absolute Neutrophils 4.0 1.6 - 8.3 10e3/uL    Absolute Lymphocytes 1.0 0.8 - 5.3 10e3/uL    Absolute Monocytes 0.6 0.0 - 1.3 10e3/uL    Absolute Eosinophils 0.1 0.0 - 0.7 10e3/uL    Absolute Basophils 0.0 0.0 - 0.2 10e3/uL    Absolute Immature Granulocytes 0.0 <=0.4 10e3/uL    Absolute NRBCs 0.0 10e3/uL   Adult Type and Screen   Result Value Ref Range    ABO/RH(D)  O POS     Antibody Screen Negative Negative    SPECIMEN EXPIRATION DATE [id number]        Medications - No data to display    Assessments & Plan (with Medical Decision Making)     MDM: Sina Gomez is a 81 year old male presents with evaluation for bright red blood per rectum single episode after having strained at stool several times this week.  No active bleeding now.  Prior hemoglobin 14 down to 12.  Reassuring vital signs.  Laboratory testing reassuring plan for close interval follow-up colonoscopy.  Soften stools to prevent further straining.  At this point no indication for imaging.  Precautions given for return.         I have reviewed the nursing notes.    I have reviewed the findings, diagnosis, plan and need for follow up with the patient.       New Prescriptions    No medications on file       Final diagnoses:   Lower GI bleed - recheck in 2-3 days.  follow-up colonoscopy, we will soften the stools.  double check med list - I do not see a anticoagulant but you have had a TAVR.  miralax 1 caful daily in 8 oz fluid for 1 week.  if by day 2-3 stools are still with straining. use mag citrate 150 ml (1/2 bottle) twice in one day and stay close to barhroom for 6 hours after last dose.  colonoscopy.  and return here for recurrent heavy bleeding       5/16/2022   [facility] EMERGENCY DEPT     Randal Davis MD  05/17/22 0057

## 2022-05-17 NOTE — ED NOTES
Pt arrived via triage, in WC, accomp by wife, in no acute distress.  Pt states he has had on and off rectal bleeding (bright red smears on attends) for 2 weeks.  Tonight it was more and wife was concerned, and states she didn't know he has had any bleeding issues.  Pt states he has hemorrhoids, wife states he doesn't.        Pt denies any symptoms at all: no pain, no dizziness, VSS, Skin pink, warm and dry, good pulses.     Plan:  Will await MD assessment and orders.

## 2022-05-18 ENCOUNTER — OFFICE VISIT (OUTPATIENT)
Dept: FAMILY MEDICINE | Facility: CLINIC | Age: 82
End: 2022-05-18
Payer: MEDICARE

## 2022-05-18 VITALS
SYSTOLIC BLOOD PRESSURE: 122 MMHG | DIASTOLIC BLOOD PRESSURE: 60 MMHG | TEMPERATURE: 97.2 F | HEART RATE: 60 BPM | OXYGEN SATURATION: 94 %

## 2022-05-18 DIAGNOSIS — K62.5 RECTAL BLEEDING: Primary | ICD-10-CM

## 2022-05-18 DIAGNOSIS — Z95.5 PRESENCE OF CORONARY ARTERY BYPASS GRAFT STENT: ICD-10-CM

## 2022-05-18 DIAGNOSIS — Z95.2 S/P TAVR (TRANSCATHETER AORTIC VALVE REPLACEMENT): ICD-10-CM

## 2022-05-18 DIAGNOSIS — Z95.1 PRESENCE OF CORONARY ARTERY BYPASS GRAFT STENT: ICD-10-CM

## 2022-05-18 DIAGNOSIS — I25.718 CORONARY ARTERY DISEASE OF AUTOLOGOUS VEIN BYPASS GRAFT WITH STABLE ANGINA PECTORIS (H): ICD-10-CM

## 2022-05-18 LAB
ERYTHROCYTE [DISTWIDTH] IN BLOOD BY AUTOMATED COUNT: 14.3 % (ref 10–15)
HCT VFR BLD AUTO: 38.9 % (ref 40–53)
HGB BLD-MCNC: 12.1 G/DL (ref 13.3–17.7)
MCH RBC QN AUTO: 31.4 PG (ref 26.5–33)
MCHC RBC AUTO-ENTMCNC: 31.1 G/DL (ref 31.5–36.5)
MCV RBC AUTO: 101 FL (ref 78–100)
PLATELET # BLD AUTO: 166 10E3/UL (ref 150–450)
RBC # BLD AUTO: 3.85 10E6/UL (ref 4.4–5.9)
WBC # BLD AUTO: 5.1 10E3/UL (ref 4–11)

## 2022-05-18 PROCEDURE — 85027 COMPLETE CBC AUTOMATED: CPT | Performed by: FAMILY MEDICINE

## 2022-05-18 PROCEDURE — 36415 COLL VENOUS BLD VENIPUNCTURE: CPT | Performed by: FAMILY MEDICINE

## 2022-05-18 PROCEDURE — 99214 OFFICE O/P EST MOD 30 MIN: CPT | Performed by: FAMILY MEDICINE

## 2022-05-18 NOTE — PROGRESS NOTES
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    Weight as of 5/16/22: 98 kg (216 lb).       CONSULTATION/REFERRAL to colonoscopy    No follow-ups on file.    Brionna White MD  [facility] LYNDSAY Andino is a 81 year old who presents for the following health issues  accompanied by his daughter. & Wife    HPI     ED/UC Followup:    Facility:  [facility] ED  Date of visit: 5/16/22  Reason for visit: Lower GI bleeding  Current Status: not bleeding at present         Holding plavix will restart tomorrow has not stopped the asa     Review of Systems   Constitutional, HEENT, cardiovascular, pulmonary, gi and gu systems are negative, except as otherwise noted.      Objective    /60   Pulse 60   Temp 97.2  F (36.2  C) (Tympanic)   SpO2 94%   There is no height or weight on file to calculate BMI.  Physical Exam   GENERAL: healthy, alert and no distress  Seated in wheelchair his color is good he appears comfortable.    Results for orders placed or performed in visit on 05/18/22 (from the past 24 hour(s))   CBC with platelets   Result Value Ref Range    WBC Count 5.1 4.0 - 11.0 10e3/uL    RBC Count 3.85 (L) 4.40 - 5.90 10e6/uL    Hemoglobin 12.1 (L) 13.3 - 17.7 g/dL    Hematocrit 38.9 (L) 40.0 - 53.0 %     (H) 78 - 100 fL    MCH 31.4 26.5 - 33.0 pg    MCHC 31.1 (L) 31.5 - 36.5 g/dL    RDW 14.3 10.0 - 15.0 %    Platelet " Count 166 150  450 10e3/uL       Brionna White M.D.

## 2022-05-23 ENCOUNTER — TELEPHONE (OUTPATIENT)
Dept: EMERGENCY MEDICINE | Facility: CLINIC | Age: 82
End: 2022-05-23
Payer: MEDICARE

## 2022-05-24 ENCOUNTER — ANCILLARY PROCEDURE (OUTPATIENT)
Dept: NEUROLOGY | Facility: CLINIC | Age: 82
End: 2022-05-24
Attending: PSYCHIATRY & NEUROLOGY
Payer: MEDICARE

## 2022-05-24 ENCOUNTER — OFFICE VISIT (OUTPATIENT)
Dept: NEUROLOGY | Facility: CLINIC | Age: 82
End: 2022-05-24

## 2022-05-24 ENCOUNTER — TELEPHONE (OUTPATIENT)
Dept: FAMILY MEDICINE | Facility: CLINIC | Age: 82
End: 2022-05-24

## 2022-05-24 VITALS
HEIGHT: 70 IN | BODY MASS INDEX: 30.78 KG/M2 | SYSTOLIC BLOOD PRESSURE: 144 MMHG | HEART RATE: 59 BPM | WEIGHT: 215 LBS | DIASTOLIC BLOOD PRESSURE: 70 MMHG

## 2022-05-24 DIAGNOSIS — R41.89 SUBJECTIVE MEMORY COMPLAINTS: ICD-10-CM

## 2022-05-24 DIAGNOSIS — I63.9 CEREBROVASCULAR ACCIDENT (CVA), UNSPECIFIED MECHANISM (H): Primary | ICD-10-CM

## 2022-05-24 DIAGNOSIS — Z87.820 HX OF TRAUMATIC BRAIN INJURY: ICD-10-CM

## 2022-05-24 PROCEDURE — 99215 OFFICE O/P EST HI 40 MIN: CPT | Performed by: PSYCHIATRY & NEUROLOGY

## 2022-05-24 PROCEDURE — 95816 EEG AWAKE AND DROWSY: CPT | Performed by: PSYCHIATRY & NEUROLOGY
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
## 2022-05-24 NOTE — TELEPHONE ENCOUNTER
"Family has questions about next steps after EEG today. Wondering if [facility] \"sets up the therapy.\" Requesting call back from RN to discuss.    Thank you,    Sona Calvillo, LINWO[facility] Aguilar    "
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
## 2022-05-25 NOTE — TELEPHONE ENCOUNTER
Call placed to patient's wife     Wife states that patient had his EEG yesterday for his memory loss and mini-strokes   States she is wondering what next steps are for his memory loss  Wondering if medication or a therapy of some sort would be beneficial to patient    Wife also states patient needs a Colonoscopy    Wife states she had eye surgery yesterday and is wanting author to contact their daughter Lacy Sullivan at [phone] for additional information     Author called daughter - no answer; generic voicemail left requesting call back to Clinic RN at [phone]    Kike Flynn, RN

## 2022-05-27 NOTE — TELEPHONE ENCOUNTER
Call placed to patient's wife   Relayed Dr. White's message     Wife verbalized understanding  No further questions/concerns    Kike Flynn RN

## 2022-05-27 NOTE — TELEPHONE ENCOUNTER
They should follow up with neurology. That is where I referred them and they will give them the next steps. He has an order on file for a colonoscopy already. He should be able to schedule that .

## 2022-06-02 ENCOUNTER — HOSPITAL ENCOUNTER (OUTPATIENT)
Dept: CARDIOLOGY | Facility: HOSPITAL | Age: 82
Discharge: HOME | End: 2022-06-02
Attending: PSYCHIATRY & NEUROLOGY
Payer: MEDICARE

## 2022-06-02 DIAGNOSIS — I63.9 CEREBROVASCULAR ACCIDENT (CVA), UNSPECIFIED MECHANISM (H): ICD-10-CM

## 2022-06-02 LAB — LVEF ECHO: NORMAL

## 2022-06-02 PROCEDURE — 93306 TTE W/DOPPLER COMPLETE: CPT | Mod: 26 | Performed by: INTERNAL MEDICINE

## 2022-06-02 PROCEDURE — 93306 TTE W/DOPPLER COMPLETE: CPT

## 2022-06-02 PROCEDURE — 93226 XTRNL ECG REC<48 HR SCAN A/R: CPT

## 2022-06-08 PROCEDURE — 93227 XTRNL ECG REC<48 HR R&I: CPT | Performed by: INTERNAL MEDICINE

## 2022-06-21 ENCOUNTER — PREP FOR PROCEDURE (OUTPATIENT)
Dept: SURGERY | Facility: CLINIC | Age: 82
End: 2022-06-21

## 2022-06-21 DIAGNOSIS — Z13.9 SCREENING FOR CONDITION: Primary | ICD-10-CM

## 2022-06-27 ENCOUNTER — HOSPITAL ENCOUNTER (OUTPATIENT)
Dept: ULTRASOUND IMAGING | Facility: HOSPITAL | Age: 82
Discharge: HOME | End: 2022-06-27
Attending: PSYCHIATRY & NEUROLOGY | Admitting: PSYCHIATRY & NEUROLOGY
Payer: MEDICARE

## 2022-06-27 PROCEDURE — 93880 EXTRACRANIAL BILAT STUDY: CPT

## 2022-06-30 ENCOUNTER — TELEPHONE (OUTPATIENT)
Dept: SURGERY | Facility: CLINIC | Age: 82
End: 2022-06-30

## 2022-06-30 NOTE — TELEPHONE ENCOUNTER
Spoke with patient's son Santo to schedule this procedure. Date chosen: 07.20.22. Details discussed with Santo. Confirmation letter created and available to them in MyChart.

## 2022-06-30 NOTE — LETTER
We've received instruction to get you scheduled for Colonoscopy with Dr Schmitt. We have that arranged as follows:     Pre-op Physical:  Call your primary clinic to schedule. This needs to be within 30 days of the date of procedure.    Surgery Date: 07/20/2022     Location: [facility].  [address] [place] [address]    Approximate Arrival Time: 9:30 a.m.  (Unless instructed differently by the pre-op call nurse)     Prep Tasks and Info:     1. Schedule a pre-op physical with your primary care doctor within 30 days of surgery. This is required by anesthesia and if not done, your surgery will be cancelled. Call them asap to get this scheduled.    2. Review your medications with your primary care or prescribing physician; they will advise you which meds to stop and when, and when you can resume taking.  Certain medications like blood thinners need to be stopped in advance of surgery to proceed safely.    3. You must get tested for COVID-19, even if you are vaccinated.    Outpatient Surgery:  If you are going home the same day of surgery, a home rapid antigen Covid-19 test is required 1-2 days before surgery- regardless of your vaccination status.  Take a photo of the negative result and show to the nurse on the day of surgery. If you test positive, contact our office right away to reschedule surgery. You can buy a home Covid-19 Rapid Antigen test at many local pharmacies, or you can order for free at covid.gov/tests.    Admits after Surgery:  If you are staying overnight or longer following surgery, a PCR test is required 4 days before surgery instead of the rapid antigen test.   Please schedule a PCR test with [facility] by calling [phone] or visit [email or web address].  You are permitted to have this done outside of our system but must fax the result to [phone].     4. Please shower the evening before and morning of surgery with Hibiclens or  Exidine soap.  This can be found at your local pharmacy.    5. Fasting instructions will be provided by the pre-op nurse who will call you 1-3 days before surgery.  Typically we advise normal food up to 8 hours before surgery then clear liquids only up to 2 hours before surgery then nothing at all by mouth for 2 hours including no gum or candy.  The nurse will review your specific instructions with you at the call.      6. Smoking impacts your body's ability to heal properly.  If you are a smoker, we strongly urge you to stop smoking 4-6 weeks before surgery. Plastic surgery patients are required to be nicotine free for 6-8 weeks before surgery.     7. You will need an adult to drive you home and stay with you 24 hours after surgery. Public transportation or Medical Van Services are not permitted.    8. You may have one family member wait in the lobby at the surgery center during your surgery. Visitor restrictions are subject to change, please verify with the pre-op nurse when they call.    9. If the community sees a new COVID19 surge, your procedure may need to be postponed. We will contact you if this happens. You will be screened for high-risk exposure to Covid-19 during the pre-op call.  We encourage you to quarantine yourself away from any Covid-19 people for 10 days before surgery to avoid possible last minute cancellations.   When you arrive to the surgery center, you will again be screened for COVID19 symptoms. If you screen positive, your surgery will need to be postponed.    10. We always encourage you to notify your insurance any time you have medical tests or procedures scheduled including surgery. The number is usually right on the back of your insurance card. Please call [facility] Cost of Care at [phone] if you'd like a surgery quote.       Call our office if you have any questions! Thank you!

## 2022-07-05 ENCOUNTER — MEDICAL CORRESPONDENCE (OUTPATIENT)
Dept: HEALTH INFORMATION MANAGEMENT | Facility: CLINIC | Age: 82
End: 2022-07-05

## 2022-07-07 ASSESSMENT — ANXIETY QUESTIONNAIRES
4. TROUBLE RELAXING: NOT AT ALL
1. FEELING NERVOUS, ANXIOUS, OR ON EDGE: NOT AT ALL
GAD7 TOTAL SCORE: 1
5. BEING SO RESTLESS THAT IT IS HARD TO SIT STILL: NOT AT ALL
3. WORRYING TOO MUCH ABOUT DIFFERENT THINGS: SEVERAL DAYS
6. BECOMING EASILY ANNOYED OR IRRITABLE: NOT AT ALL
7. FEELING AFRAID AS IF SOMETHING AWFUL MIGHT HAPPEN: NOT AT ALL
GAD7 TOTAL SCORE: 1
7. FEELING AFRAID AS IF SOMETHING AWFUL MIGHT HAPPEN: NOT AT ALL
8. IF YOU CHECKED OFF ANY PROBLEMS, HOW DIFFICULT HAVE THESE MADE IT FOR YOU TO DO YOUR WORK, TAKE CARE OF THINGS AT HOME, OR GET ALONG WITH OTHER PEOPLE?: NOT DIFFICULT AT ALL
2. NOT BEING ABLE TO STOP OR CONTROL WORRYING: NOT AT ALL
GAD7 TOTAL SCORE: 1

## 2022-07-07 ASSESSMENT — PATIENT HEALTH QUESTIONNAIRE - PHQ9
10. IF YOU CHECKED OFF ANY PROBLEMS, HOW DIFFICULT HAVE THESE PROBLEMS MADE IT FOR YOU TO DO YOUR WORK, TAKE CARE OF THINGS AT HOME, OR GET ALONG WITH OTHER PEOPLE: NOT DIFFICULT AT ALL
SUM OF ALL RESPONSES TO PHQ QUESTIONS 1-9: 6
SUM OF ALL RESPONSES TO PHQ QUESTIONS 1-9: 6

## 2022-07-14 ENCOUNTER — OFFICE VISIT (OUTPATIENT)
Dept: FAMILY MEDICINE | Facility: CLINIC | Age: 82
End: 2022-07-14
Payer: MEDICARE

## 2022-07-14 VITALS
BODY MASS INDEX: 30.99 KG/M2 | OXYGEN SATURATION: 94 % | DIASTOLIC BLOOD PRESSURE: 60 MMHG | WEIGHT: 216 LBS | TEMPERATURE: 98.8 F | HEART RATE: 56 BPM | SYSTOLIC BLOOD PRESSURE: 122 MMHG

## 2022-07-14 DIAGNOSIS — F33.41 RECURRENT MAJOR DEPRESSIVE DISORDER, IN PARTIAL REMISSION (H): ICD-10-CM

## 2022-07-14 DIAGNOSIS — Z79.899 ENCOUNTER FOR LONG-TERM (CURRENT) USE OF MEDICATIONS: ICD-10-CM

## 2022-07-14 DIAGNOSIS — Z01.818 PREOP GENERAL PHYSICAL EXAM: Primary | ICD-10-CM

## 2022-07-14 DIAGNOSIS — K62.5 RECTAL BLEEDING: ICD-10-CM

## 2022-07-14 DIAGNOSIS — F11.90 CHRONIC, CONTINUOUS USE OF OPIOIDS: ICD-10-CM

## 2022-07-14 LAB
ALBUMIN SERPL-MCNC: 3.1 G/DL (ref 3.4–5)
ALP SERPL-CCNC: 87 U/L (ref 40–150)
ALT SERPL W P-5'-P-CCNC: 28 U/L (ref 0–70)
ANION GAP SERPL CALCULATED.3IONS-SCNC: <1 MMOL/L (ref 3–14)
AST SERPL W P-5'-P-CCNC: 28 U/L (ref 0–45)
BILIRUB SERPL-MCNC: 0.5 MG/DL (ref 0.2–1.3)
BUN SERPL-MCNC: 20 MG/DL (ref 7–30)
CALCIUM SERPL-MCNC: 8.9 MG/DL (ref 8.5–10.1)
CHLORIDE BLD-SCNC: 106 MMOL/L (ref 94–109)
CO2 SERPL-SCNC: 33 MMOL/L (ref 20–32)
CREAT SERPL-MCNC: 0.83 MG/DL (ref 0.66–1.25)
CREAT UR-MCNC: 111 MG/DL
ERYTHROCYTE [DISTWIDTH] IN BLOOD BY AUTOMATED COUNT: 14.8 % (ref 10–15)
GFR SERPL CREATININE-BSD FRML MDRD: 87 ML/MIN/1.73M2
GLUCOSE BLD-MCNC: 100 MG/DL (ref 70–99)
HCT VFR BLD AUTO: 40.4 % (ref 40–53)
HGB BLD-MCNC: 12.5 G/DL (ref 13.3–17.7)
MCH RBC QN AUTO: 31.5 PG (ref 26.5–33)
MCHC RBC AUTO-ENTMCNC: 30.9 G/DL (ref 31.5–36.5)
MCV RBC AUTO: 102 FL (ref 78–100)
PLATELET # BLD AUTO: 162 10E3/UL (ref 150–450)
POTASSIUM BLD-SCNC: 4.1 MMOL/L (ref 3.4–5.3)
PROT SERPL-MCNC: 6.4 G/DL (ref 6.8–8.8)
RBC # BLD AUTO: 3.97 10E6/UL (ref 4.4–5.9)
SODIUM SERPL-SCNC: 138 MMOL/L (ref 133–144)
WBC # BLD AUTO: 5.2 10E3/UL (ref 4–11)

## 2022-07-14 PROCEDURE — 85027 COMPLETE CBC AUTOMATED: CPT | Performed by: FAMILY MEDICINE

## 2022-07-14 PROCEDURE — 99215 OFFICE O/P EST HI 40 MIN: CPT | Performed by: FAMILY MEDICINE

## 2022-07-14 PROCEDURE — 80053 COMPREHEN METABOLIC PANEL: CPT | Performed by: FAMILY MEDICINE

## 2022-07-14 PROCEDURE — 80307 DRUG TEST PRSMV CHEM ANLYZR: CPT | Performed by: FAMILY MEDICINE

## 2022-07-14 PROCEDURE — 36415 COLL VENOUS BLD VENIPUNCTURE: CPT | Performed by: FAMILY MEDICINE

## 2022-07-14 RX ORDER — POLYETHYLENE GLYCOL 3350 17 G/17G
POWDER, FOR SOLUTION ORAL
Qty: 255 G | Refills: 0 | Status: SHIPPED | OUTPATIENT
Start: 2022-07-14

## 2022-07-14 RX ORDER — BISACODYL 5 MG/1
TABLET, DELAYED RELEASE ORAL
Qty: 10 TABLET | Refills: 0 | Status: SHIPPED | OUTPATIENT
Start: 2022-07-14 | End: 2022-11-25

## 2022-07-14 NOTE — PROGRESS NOTES
[facility]  [address]  [facility] [address]  Phone: [phone]  Primary Provider: Brionna White  Pre-op Performing Provider: BRIONNA WHITE    PREOPERATIVE EVALUATION:  Today's date: 7/14/2022    Sina Gomez is a 81 year old male who presents for a preoperative evaluation.    Surgical Information:  Surgery/Procedure: COLONOSCOPY  Surgery Location: [facility]  Surgeon: Dexter Schmitt MD  Surgery Date: 7/20/20  Time of Surgery: 7:30am  Where patient plans to recover: At home with family  Fax number for surgical facility: Note does not need to be faxed, will be available electronically in Epic.    Type of Anesthesia Anticipated: Monitor Anesthesia Care    Assessment & Plan     The proposed surgical procedure is considered LOW risk (endoscopy).    Preop general physical exam  - Comprehensive metabolic panel (BMP + Alb, Alk Phos, ALT, AST, Total. Bili, TP)  - Comprehensive metabolic panel (BMP + Alb, Alk Phos, ALT, AST, Total. Bili, TP)    Encounter for long-term (current) use of medications  [place] PMDP checked this visit. Continue.  - [id number] - Urine Drug Confirmation Panel (Comprehensive)  - [id number] - Urine Drug Confirmation Panel (Comprehensive)    Chronic, continuous use of opioids  See above.    Recurrent major depressive disorder, in partial remission (H)  Managed well with current medications, continue.    Rectal bleeding  Patient has had recent episode of rectal bleeding in May of 2022 with hemoglobin dropping from 14 to 12 and stable. Patient started back on his Plavix after leaving the hospital and diagnostic colonoscopy ordered.   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 Discussed with patient at length regarding when to hold medications and risks and benefits of procedure.    Risks and Recommendations:  The patient has the following additional risks and recommendations for perioperative complications:  Cardiovascular:   - Followed closely by cardiology, ECHO/Holter/EKG/US Carotid within the past 90 days  Obstructive Sleep Apnea:   Uses CPAP    Medication Instructions:   - aspirin: Discontinue aspirin 7-10 days prior to procedure to reduce bleeding risk. It should be resumed postoperatively.    - clopidrogel (Plavix), prasugrel (Effient), ticagrelor (Brilinta): No contraindication to stopping Plavix, HOLD 5-7 days before surgery.     RECOMMENDATION:  APPROVAL GIVEN to proceed with proposed procedure pending review of diagnostic evaluation.    Review of external notes as documented above   Independent interpretation of a test performed by another physician/other qualified health care professional (not separately reported) - Cardiology  Discussion of management or test interpretation with external physician/other qualified healthcare professional/appropriate source - Cardiology      Subjective     HPI related to upcoming procedure: Diagnostic colonoscopy related to heavy rectum bleeding about a month ago, hasn't had anything since then, was seen in May at [facility] for Lower GI Bleed    Preop Questions 7/7/2022   1. Have you ever had a heart attack or stroke? YES - 2020, TIA since then   2. Have you ever had surgery on your heart or blood vessels, such as a stent placement, a coronary artery bypass, or surgery on an artery in your head, neck, heart, or legs? YES - Stents x3, transcatheter aortic valve replacement   3. Do you have chest pain with activity? No   4. Do you have a history of  heart failure? No   5. Do you currently have a cold, bronchitis or symptoms of  other infection? No   6. Do you have a cough, shortness of breath, or wheezing? No   7. Do you or anyone in your family have previous history of blood clots? UNKNOWN - Mother had heart attack   8. Do you or does anyone in your family have a serious bleeding problem such as prolonged bleeding following surgeries or cuts? No   9. Have you ever had problems with anemia or been told to take iron pills? No   10. Have you had any abnormal blood loss such as black, tarry or bloody stools? No   11. Have you ever had a blood transfusion? YES - Patient and family don't recall ever having one   11a. Have you ever had a transfusion reaction? UNKNOWN - see above   12. Are you willing to have a blood transfusion if it is medically needed before, during, or after your surgery? Yes   13. Have you or any of your relatives ever had problems with anesthesia? UNKNOWN    14. Do you have sleep apnea, excessive snoring or daytime drowsiness? YES - uses CPAP   14a. Do you have a CPAP machine? Yes   15. Do you have any artifical heart valves or other implanted medical devices like a pacemaker, defibrillator, or continuous glucose monitor? No   16. Do you have artificial joints? YES - left knee and hip   17. Are you allergic to latex? No       Health Care Directive:  Patient does not have a Health Care Directive or Living Will: Patient states has Advance Directive and will bring in a copy to clinic.    Preoperative Review of :   reviewed - controlled substances reflected in medication list.    Status of Chronic Conditions:  CAD - Patient has a longstanding history of moderate-severe CAD. Patient denies recent chest pain or NTG use, denies exercise induced dyspnea or PND. Last ECHO 6/2/22 , EKG 5/16/22.       Review of Systems  Constitutional, neuro, ENT, endocrine, pulmonary, cardiac, gastrointestinal, genitourinary, musculoskeletal, integument and psychiatric systems are negative, except as otherwise noted.    Patient Active Problem  List    Diagnosis Date Noted     Chronic, continuous use of opioids 05/06/2022     Priority: Medium     Hypothyroidism due to acquired atrophy of thyroid 05/06/2022     Priority: Medium     KALIA (obstructive sleep apnea) 05/06/2022     Priority: Medium     Presence of coronary artery bypass graft stent 05/06/2022     Priority: Medium     S/P TAVR (transcatheter aortic valve replacement) 05/06/2022     Priority: Medium     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) 05/06/2022     Priority: Medium     Recurrent major depressive disorder, in partial remission (H) 05/06/2022     Priority: Medium     Localized, primary osteoarthritis of shoulder region 12/02/2019     Priority: Medium     Malignant tumor of prostate (H) 03/26/2018     Priority: Medium      No past medical history on file.  Past Surgical History:   Procedure Laterality Date     left hip replacement Left 05/03/1995     Current Outpatient Medications   Medication Sig Dispense Refill     aspirin (ASA) 81 MG chewable tablet Take 1 tablet (81 mg) by mouth daily 90 tablet 1     atorvastatin (LIPITOR) 40 MG tablet Take 60 mg by mouth daily       busPIRone (BUSPAR) 15 MG tablet Take 1 tablet by mouth every 8 hours       clopidogrel (PLAVIX) 75 MG tablet Take 75 mg by mouth daily       diclofenac (VOLTAREN) 1 % topical gel Apply topically 2 times daily as needed       furosemide (LASIX) 20 MG tablet TAKE 1 TAB BY MOUTH ONCE DAILY 30 tablet 1     levothyroxine (SYNTHROID/LEVOTHROID) 50 MCG tablet TAKE 1 TAB BY MOUTH ONCE DAILY 90 tablet 0     melatonin 3 MG tablet Take 3 mg by mouth At Bedtime       nitroGLYcerin (NITROSTAT) 0.4 MG sublingual tablet Place 0.4 mg under the tongue every 5 minutes as needed       nystatin (MYCOSTATIN) 681865 UNIT/GM external powder Apply topically 2 times daily To groin area.  Okay to self administer       olmesartan (BENICAR) 20 MG tablet TAKE 1 TAB BY MOUTH ONCE DAILY 90 tablet 0      oxyCODONE (ROXICODONE) 5 MG tablet TAKE 1/2 TAB (2.5MG) BY MOUTH AT BEDTIME; TAKE 1/2 TAB (2.5MG) BY MOUTH EVERY 6 HOURS AS NEEDED 30 tablet 0     sertraline (ZOLOFT) 100 MG tablet Take 100 mg by mouth daily       traZODone (DESYREL) 100 MG tablet Take 100 mg by mouth At Bedtime       VITAMIN D3 50 MCG (2000 UT) tablet Take 2 tablets by mouth daily       XTAMPZA ER 13.5 MG 12 hr tablet TAKE 1 CAP BY MOUTH TWICE DAILY FOR CHRONIC PAIN 60 capsule 0     XTAMPZA ER 9 MG 12 hr tablet TAKE 1 CAP BY MOUTH ONCE DAILY AT 2PM 30 capsule 0       Allergies   Allergen Reactions     Penicillins         Social History     Tobacco Use     Smoking status: Never Smoker     Smokeless tobacco: Never Used   Substance Use Topics     Alcohol use: Never     No family history on file.  History   Drug Use Unknown       Answers for HPI/ROS submitted by the patient on 7/7/2022  If you checked off any problems, how difficult have these problems made it for you to do your work, take care of things at home, or get along with other people?: Not difficult at all  PHQ9 TOTAL SCORE: 6  AXEL 7 TOTAL SCORE: 1      Objective     Wt 98 kg (216 lb)   BMI 30.99 kg/m      Physical Exam  GENERAL APPEARANCE: healthy, alert and no distress  HENT: ear canals and TM's normal and nose and mouth without ulcers or lesions  RESP: lungs clear to auscultation - no rales, rhonchi or wheezes  CV: regular rate and rhythm, normal S1 S2, no S3 or S4 and no murmur, click or rub   ABDOMEN: soft, nontender, no HSM or masses and bowel sounds normal  NEURO: Normal strength and tone, sensory exam grossly normal, mentation intact and speech normal    Recent Labs   Lab Test 05/18/22  1113 05/16/22  1945 04/21/22  1005   HGB 12.1* 12.0* 14.0    161 198   INR  --  1.10  --    NA  --  145* 141   POTASSIUM  --  4.1 3.9   CR  --  0.94 0.85      Results for orders placed or performed in visit on 07/14/22   Comprehensive metabolic panel (BMP + Alb, Alk Phos, ALT, AST, Total. Bili,  TP)     Status: Abnormal   Result Value Ref Range    Sodium 138 133 - 144 mmol/L    Potassium 4.1 3.4 - 5.3 mmol/L    Chloride 106 94 - 109 mmol/L    Carbon Dioxide (CO2) 33 (H) 20 - 32 mmol/L    Anion Gap <1 (L) 3 - 14 mmol/L    Urea Nitrogen 20 7 - 30 mg/dL    Creatinine 0.83 0.66 - 1.25 mg/dL    Calcium 8.9 8.5 - 10.1 mg/dL    Glucose 100 (H) 70 - 99 mg/dL    Alkaline Phosphatase 87 40 - 150 U/L    AST 28 0 - 45 U/L    ALT 28 0 - 70 U/L    Protein Total 6.4 (L) 6.8 - 8.8 g/dL    Albumin 3.1 (L) 3.4 - 5.0 g/dL    Bilirubin Total 0.5 0.2 - 1.3 mg/dL    GFR Estimate 87 >60 mL/min/1.73m2   CBC with platelets     Status: Abnormal   Result Value Ref Range    WBC Count 5.2 4.0 - 11.0 10e3/uL    RBC Count 3.97 (L) 4.40 - 5.90 10e6/uL    Hemoglobin 12.5 (L) 13.3 - 17.7 g/dL    Hematocrit 40.4 40.0 - 53.0 %     (H) 78 - 100 fL    MCH 31.5 26.5 - 33.0 pg    MCHC 30.9 (L) 31.5 - 36.5 g/dL    RDW 14.8 10.0 - 15.0 %    Platelet Count 162 150 - 450 10e3/uL   Urine Creatinine for Drug Screen Panel     Status: None   Result Value Ref Range    Creatinine Urine for Drug Screen 111 mg/dL   [id number] - Urine Drug Confirmation Panel (Comprehensive)     Status: None (In process)    Narrative    The following orders were created for panel order [id number] - Urine Drug Confirmation Panel (Comprehensive).  Procedure                               Abnormality         Status                     ---------                               -----------         ------                     Urine Drug Confirmation ...[[id number]]                      In process                 Urine Creatinine for Fran...[[id number]]                      Final result                 Please view results for these tests on the individual orders.       Diagnostics:  Labs pending at this time.  Results will be reviewed when available.   No EKG this visit, completed in the last 90 days.   - Had EKG done in May 2022    Revised Cardiac Risk Index (RCRI):  The patient has the  following serious cardiovascular risks for perioperative complications:   - High risk surgery (>5% cardiac complication risk) = 1 point   - Coronary Artery Disease (MI, positive stress test, angina, Qs on EKG) = 1 point   - Cerebrovascular Disease (TIA or CVA) = 1 point     RCRI Interpretation: 3 points: Class IV (high risk - >11% complication rate)    Estimated Functional Capacity: CANNOT perform 4 METS without symptoms    Maryann Aleman DNP, FNP Student    Signed Electronically by: Brionna White MD  Copy of this evaluation report is provided to requesting physician.

## 2022-07-14 NOTE — PATIENT INSTRUCTIONS
Preparing for Your Surgery  Getting started  A nurse will call you to review your health history and instructions. They will give you an arrival time based on your scheduled surgery time. Please be ready to share:    Your doctor's clinic name and phone number    Your medical, surgical and anesthesia history    A list of allergies and sensitivities    A list of medicines, including herbal treatments and over-the-counter drugs    Whether the patient has a legal guardian (ask how to send us the papers in advance)  Please tell us if you're pregnant--or if there's any chance you might be pregnant. Some surgeries may injure a fetus (unborn baby), so they require a pregnancy test. Surgeries that are safe for a fetus don't always need a test, and you can choose whether to have one.   If you have a child who's having surgery, please ask for a copy of Preparing for Your Child's Surgery.    Preparing for surgery    Within 30 days of surgery: Have a pre-op exam (sometimes called an H&P, or History and Physical). This can be done at a clinic or pre-operative center.  ? If you're having a , you may not need this exam. Talk to your care team.    At your pre-op exam, talk to your care team about all medicines you take. If you need to stop any medicines before surgery, ask when to start taking them again.  ? We do this for your safety. Many medicines can make you bleed too much during surgery. Some change how well surgery (anesthesia) drugs work.    Call your insurance company to let them know you're having surgery. (If you don't have insurance, call [phone].)    Call your clinic if there's any change in your health. This includes signs of a cold or flu (sore throat, runny nose, cough, rash, fever). It also includes a scrape or scratch near the surgery site.    If you have questions on the day of surgery, call your hospital or surgery center.  COVID testing  You may need to be tested for COVID-19 before having  surgery. If so, we will give you instructions.  Eating and drinking guidelines  For your safety: Unless your surgeon tells you otherwise, follow the guidelines below.    Eat and drink as usual until 8 hours before surgery. After that, no food or milk.    Drink clear liquids until 2 hours before surgery. These are liquids you can see through, like water, Gatorade and Propel Water. You may also have black coffee and tea (no cream or milk).    Nothing by mouth within 2 hours of surgery. This includes gum, candy and breath mints.    If you drink alcohol: Stop drinking it the night before surgery.    If your care team tells you to take medicine on the morning of surgery, it's okay to take it with a sip of water.  Preventing infection    Shower or bathe the night before and morning of your surgery. Follow the instructions your clinic gave you. (If no instructions, use regular soap.)    Don't shave or clip hair near your surgery site. We'll remove the hair if needed.    Don't smoke or vape the morning of surgery. You may chew nicotine gum up to 2 hours before surgery. A nicotine patch is okay.  ? Note: Some surgeries require you to completely quit smoking and nicotine. Check with your surgeon.    Your care team will make every effort to keep you safe from infection. We will:  ? Clean our hands often with soap and water (or an alcohol-based hand rub).  ? Clean the skin at your surgery site with a special soap that kills germs.  ? Give you a special gown to keep you warm. (Cold raises the risk of infection.)  ? Wear special hair covers, masks, gowns and gloves during surgery.  ? Give antibiotic medicine, if prescribed. Not all surgeries need antibiotics.  What to bring on the day of surgery    Photo ID and insurance card    Copy of your health care directive, if you have one    Glasses and hearing aides (bring cases)  ? You can't wear contacts during surgery    Inhaler and eye drops, if you use them (tell us about these when  you arrive)    CPAP machine or breathing device, if you use them    A few personal items, if spending the night    If you have . . .  ? A pacemaker, ICD (cardiac defibrillator) or other implant: Bring the ID card.  ? An implanted stimulator: Bring the remote control.  ? A legal guardian: Bring a copy of the certified (court-stamped) guardianship papers.  Please remove any jewelry, including body piercings. Leave jewelry and other valuables at home.  If you're going home the day of surgery    You must have a responsible adult drive you home. They should stay with you overnight as well.    If you don't have someone to stay with you, and you aren't safe to go home alone, we may keep you overnight. Insurance often won't pay for this.  After surgery  If it's hard to control your pain or you need more pain medicine, please call your surgeon's office.  Questions?   If you have any questions for your care team, list them here: _________________________________________________________________________________________________________________________________________________________________________ ____________________________________ ____________________________________ ____________________________________  For informational purposes only. Not to replace the advice of your health care provider. Copyright   2003, 2019 [facility]. All rights reserved. Clinically reviewed by Sofie Hicks MD. [employer] [id number] - REV 07/21.

## 2022-07-16 NOTE — RESULT ENCOUNTER NOTE
Sina,  Your lab results were normal/stable. Please feel free to my chart or call the office with questions. Brionna White M.D.

## 2022-07-18 DIAGNOSIS — Z95.1 PRESENCE OF CORONARY ARTERY BYPASS GRAFT STENT: Primary | ICD-10-CM

## 2022-07-18 DIAGNOSIS — Z95.5 PRESENCE OF CORONARY ARTERY BYPASS GRAFT STENT: Primary | ICD-10-CM

## 2022-07-18 LAB
OXYCODONE UR CFM-MCNC: 2420 NG/ML
OXYCODONE/CREAT UR: 2180 NG/MG {CREAT}
OXYMORPHONE UR CFM-MCNC: 2260 NG/ML
OXYMORPHONE/CREAT UR: 2036 NG/MG {CREAT}

## 2022-07-18 NOTE — TELEPHONE ENCOUNTER
Routing refill request to provider for review/approval because:  Medication is reported/historical    Kike Flynn RN

## 2022-07-19 ENCOUNTER — ANESTHESIA EVENT (OUTPATIENT)
Dept: SURGERY | Facility: HOSPITAL | Age: 82
End: 2022-07-19
Payer: MEDICARE

## 2022-07-19 RX ORDER — CLOPIDOGREL BISULFATE 75 MG/1
75 TABLET ORAL DAILY
Qty: 90 TABLET | Refills: 3 | Status: SHIPPED | OUTPATIENT
Start: 2022-07-19 | End: 2023-06-08

## 2022-07-19 NOTE — OR NURSING
Maryann, nurse at [facility], states that LD of ASA was 7/14/22 and LD of Plavix was 7/15/22.  LD of Lasix will be today, 7/19/22.  Son Santo also aware of these last dosage dates via VM left for him today.

## 2022-07-19 NOTE — OR NURSING
Spoke to sonSanto and wife, Sonya about details for colonoscopy on 7/20/22.  Neither could provide last dosage dates for Plavix and ASA.  Called [facility] to speak to nurse to determine these dates.  Nurses could not be called out of meeting to speak to pre op call RN.  I was told they would call back in 10 minutes.  Still awaiting call.

## 2022-07-20 ENCOUNTER — ANESTHESIA (OUTPATIENT)
Dept: SURGERY | Facility: HOSPITAL | Age: 82
End: 2022-07-20
Payer: MEDICARE

## 2022-07-20 ENCOUNTER — HOSPITAL ENCOUNTER (OUTPATIENT)
Facility: HOSPITAL | Age: 82
Discharge: HOME | End: 2022-07-20
Attending: SURGERY | Admitting: SURGERY
Payer: MEDICARE

## 2022-07-20 VITALS
SYSTOLIC BLOOD PRESSURE: 145 MMHG | WEIGHT: 212.5 LBS | RESPIRATION RATE: 18 BRPM | BODY MASS INDEX: 30.49 KG/M2 | DIASTOLIC BLOOD PRESSURE: 64 MMHG | HEART RATE: 100 BPM | OXYGEN SATURATION: 92 % | TEMPERATURE: 99.7 F

## 2022-07-20 PROCEDURE — 710N000012 HC RECOVERY PHASE 2, PER MINUTE: Performed by: SURGERY

## 2022-07-20 PROCEDURE — 45380 COLONOSCOPY AND BIOPSY: CPT | Mod: PT | Performed by: SURGERY

## 2022-07-20 PROCEDURE — 250N000011 HC RX IP 250 OP 636: Performed by: NURSE ANESTHETIST, CERTIFIED REGISTERED

## 2022-07-20 PROCEDURE — 250N000009 HC RX 250: Performed by: NURSE ANESTHETIST, CERTIFIED REGISTERED

## 2022-07-20 PROCEDURE — 710N000009 HC RECOVERY PHASE 1, LEVEL 1, PER MIN: Performed by: SURGERY

## 2022-07-20 PROCEDURE — 250N000011 HC RX IP 250 OP 636: Performed by: ANESTHESIOLOGY

## 2022-07-20 PROCEDURE — 370N000017 HC ANESTHESIA TECHNICAL FEE, PER MIN: Performed by: SURGERY

## 2022-07-20 PROCEDURE — 88305 TISSUE EXAM BY PATHOLOGIST: CPT | Mod: TC | Performed by: SURGERY

## 2022-07-20 PROCEDURE — 45385 COLONOSCOPY W/LESION REMOVAL: CPT | Mod: PT | Performed by: SURGERY

## 2022-07-20 PROCEDURE — 360N000075 HC SURGERY LEVEL 2, PER MIN: Performed by: SURGERY

## 2022-07-20 PROCEDURE — 272N000001 HC OR GENERAL SUPPLY STERILE: Performed by: SURGERY

## 2022-07-20 PROCEDURE — 258N000003 HC RX IP 258 OP 636: Performed by: ANESTHESIOLOGY

## 2022-07-20 PROCEDURE — 250N000011 HC RX IP 250 OP 636: Performed by: SURGERY

## 2022-07-20 PROCEDURE — 999N000141 HC STATISTIC PRE-PROCEDURE NURSING ASSESSMENT: Performed by: SURGERY

## 2022-07-20 RX ORDER — LIDOCAINE 40 MG/G
CREAM TOPICAL
Status: DISCONTINUED | OUTPATIENT
Start: 2022-07-20 | End: 2022-07-20 | Stop reason: HOSPADM

## 2022-07-20 RX ORDER — HYDRALAZINE HYDROCHLORIDE 20 MG/ML
10 INJECTION INTRAMUSCULAR; INTRAVENOUS
Status: COMPLETED | OUTPATIENT
Start: 2022-07-20 | End: 2022-07-20

## 2022-07-20 RX ORDER — NALOXONE HYDROCHLORIDE 0.4 MG/ML
0.4 INJECTION, SOLUTION INTRAMUSCULAR; INTRAVENOUS; SUBCUTANEOUS
Status: DISCONTINUED | OUTPATIENT
Start: 2022-07-20 | End: 2022-07-20 | Stop reason: HOSPADM

## 2022-07-20 RX ORDER — FENTANYL CITRATE 50 UG/ML
25 INJECTION, SOLUTION INTRAMUSCULAR; INTRAVENOUS
Status: CANCELLED | OUTPATIENT
Start: 2022-07-20

## 2022-07-20 RX ORDER — ONDANSETRON 2 MG/ML
INJECTION INTRAMUSCULAR; INTRAVENOUS PRN
Status: DISCONTINUED | OUTPATIENT
Start: 2022-07-20 | End: 2022-07-20

## 2022-07-20 RX ORDER — HYDRALAZINE HYDROCHLORIDE 20 MG/ML
10 INJECTION INTRAMUSCULAR; INTRAVENOUS
Status: DISCONTINUED | OUTPATIENT
Start: 2022-07-20 | End: 2022-07-20 | Stop reason: HOSPADM

## 2022-07-20 RX ORDER — ONDANSETRON 4 MG/1
4 TABLET, ORALLY DISINTEGRATING ORAL EVERY 30 MIN PRN
Status: DISCONTINUED | OUTPATIENT
Start: 2022-07-20 | End: 2022-07-20 | Stop reason: HOSPADM

## 2022-07-20 RX ORDER — FENTANYL CITRATE 50 UG/ML
25 INJECTION, SOLUTION INTRAMUSCULAR; INTRAVENOUS EVERY 5 MIN PRN
Status: DISCONTINUED | OUTPATIENT
Start: 2022-07-20 | End: 2022-07-20 | Stop reason: HOSPADM

## 2022-07-20 RX ORDER — EPHEDRINE SULFATE 50 MG/ML
INJECTION, SOLUTION INTRAMUSCULAR; INTRAVENOUS; SUBCUTANEOUS PRN
Status: DISCONTINUED | OUTPATIENT
Start: 2022-07-20 | End: 2022-07-20

## 2022-07-20 RX ORDER — LEVOFLOXACIN 5 MG/ML
500 INJECTION, SOLUTION INTRAVENOUS ONCE
Status: COMPLETED | OUTPATIENT
Start: 2022-07-20 | End: 2022-07-20

## 2022-07-20 RX ORDER — KETOROLAC TROMETHAMINE 30 MG/ML
15 INJECTION, SOLUTION INTRAMUSCULAR; INTRAVENOUS EVERY 6 HOURS PRN
Status: DISCONTINUED | OUTPATIENT
Start: 2022-07-20 | End: 2022-07-20 | Stop reason: HOSPADM

## 2022-07-20 RX ORDER — HYDROMORPHONE HYDROCHLORIDE 1 MG/ML
0.4 INJECTION, SOLUTION INTRAMUSCULAR; INTRAVENOUS; SUBCUTANEOUS EVERY 5 MIN PRN
Status: DISCONTINUED | OUTPATIENT
Start: 2022-07-20 | End: 2022-07-20 | Stop reason: HOSPADM

## 2022-07-20 RX ORDER — LORAZEPAM 2 MG/ML
.5-1 INJECTION INTRAMUSCULAR
Status: DISCONTINUED | OUTPATIENT
Start: 2022-07-20 | End: 2022-07-20 | Stop reason: HOSPADM

## 2022-07-20 RX ORDER — HALOPERIDOL 5 MG/ML
1 INJECTION INTRAMUSCULAR
Status: DISCONTINUED | OUTPATIENT
Start: 2022-07-20 | End: 2022-07-20 | Stop reason: HOSPADM

## 2022-07-20 RX ORDER — SODIUM CHLORIDE, SODIUM LACTATE, POTASSIUM CHLORIDE, CALCIUM CHLORIDE 600; 310; 30; 20 MG/100ML; MG/100ML; MG/100ML; MG/100ML
INJECTION, SOLUTION INTRAVENOUS CONTINUOUS
Status: DISCONTINUED | OUTPATIENT
Start: 2022-07-20 | End: 2022-07-20 | Stop reason: HOSPADM

## 2022-07-20 RX ORDER — DIPHENHYDRAMINE HYDROCHLORIDE 50 MG/ML
50 INJECTION INTRAMUSCULAR; INTRAVENOUS
Status: DISCONTINUED | OUTPATIENT
Start: 2022-07-20 | End: 2022-07-20 | Stop reason: HOSPADM

## 2022-07-20 RX ORDER — MEPERIDINE HYDROCHLORIDE 25 MG/ML
12.5 INJECTION INTRAMUSCULAR; INTRAVENOUS; SUBCUTANEOUS
Status: DISCONTINUED | OUTPATIENT
Start: 2022-07-20 | End: 2022-07-20 | Stop reason: HOSPADM

## 2022-07-20 RX ORDER — LIDOCAINE HYDROCHLORIDE 10 MG/ML
INJECTION, SOLUTION INFILTRATION; PERINEURAL PRN
Status: DISCONTINUED | OUTPATIENT
Start: 2022-07-20 | End: 2022-07-20

## 2022-07-20 RX ORDER — ONDANSETRON 2 MG/ML
4 INJECTION INTRAMUSCULAR; INTRAVENOUS
Status: DISCONTINUED | OUTPATIENT
Start: 2022-07-20 | End: 2022-07-20 | Stop reason: HOSPADM

## 2022-07-20 RX ORDER — ONDANSETRON 2 MG/ML
4 INJECTION INTRAMUSCULAR; INTRAVENOUS EVERY 30 MIN PRN
Status: DISCONTINUED | OUTPATIENT
Start: 2022-07-20 | End: 2022-07-20 | Stop reason: HOSPADM

## 2022-07-20 RX ORDER — PROPOFOL 10 MG/ML
INJECTION, EMULSION INTRAVENOUS CONTINUOUS PRN
Status: DISCONTINUED | OUTPATIENT
Start: 2022-07-20 | End: 2022-07-20

## 2022-07-20 RX ORDER — DEXAMETHASONE SODIUM PHOSPHATE 4 MG/ML
INJECTION, SOLUTION INTRA-ARTICULAR; INTRALESIONAL; INTRAMUSCULAR; INTRAVENOUS; SOFT TISSUE PRN
Status: DISCONTINUED | OUTPATIENT
Start: 2022-07-20 | End: 2022-07-20

## 2022-07-20 RX ORDER — NALOXONE HYDROCHLORIDE 0.4 MG/ML
0.2 INJECTION, SOLUTION INTRAMUSCULAR; INTRAVENOUS; SUBCUTANEOUS
Status: DISCONTINUED | OUTPATIENT
Start: 2022-07-20 | End: 2022-07-20 | Stop reason: HOSPADM

## 2022-07-20 RX ORDER — OXYCODONE HYDROCHLORIDE 5 MG/1
5 TABLET ORAL EVERY 4 HOURS PRN
Status: DISCONTINUED | OUTPATIENT
Start: 2022-07-20 | End: 2022-07-20 | Stop reason: HOSPADM

## 2022-07-20 RX ORDER — EPINEPHRINE 1 MG/ML
0.3 INJECTION, SOLUTION INTRAMUSCULAR; SUBCUTANEOUS EVERY 5 MIN PRN
Status: DISCONTINUED | OUTPATIENT
Start: 2022-07-20 | End: 2022-07-20 | Stop reason: HOSPADM

## 2022-07-20 RX ORDER — PROPOFOL 10 MG/ML
INJECTION, EMULSION INTRAVENOUS PRN
Status: DISCONTINUED | OUTPATIENT
Start: 2022-07-20 | End: 2022-07-20

## 2022-07-20 RX ORDER — MEPERIDINE HYDROCHLORIDE 25 MG/ML
12.5 INJECTION INTRAMUSCULAR; INTRAVENOUS; SUBCUTANEOUS ONCE
Status: COMPLETED | OUTPATIENT
Start: 2022-07-20 | End: 2022-07-20

## 2022-07-20 RX ORDER — METHYLPREDNISOLONE SODIUM SUCCINATE 125 MG/2ML
125 INJECTION, POWDER, LYOPHILIZED, FOR SOLUTION INTRAMUSCULAR; INTRAVENOUS
Status: DISCONTINUED | OUTPATIENT
Start: 2022-07-20 | End: 2022-07-20 | Stop reason: HOSPADM

## 2022-07-20 RX ADMIN — HYDRALAZINE HYDROCHLORIDE 10 MG: 20 INJECTION INTRAMUSCULAR; INTRAVENOUS at 13:23

## 2022-07-20 RX ADMIN — LIDOCAINE HYDROCHLORIDE 5 ML: 10 INJECTION, SOLUTION INFILTRATION; PERINEURAL at 07:33

## 2022-07-20 RX ADMIN — ROCURONIUM BROMIDE 50 MG: 50 INJECTION, SOLUTION INTRAVENOUS at 09:06

## 2022-07-20 RX ADMIN — LIDOCAINE HYDROCHLORIDE 5 ML: 10 INJECTION, SOLUTION INFILTRATION; PERINEURAL at 09:06

## 2022-07-20 RX ADMIN — ONDANSETRON 4 MG: 2 INJECTION INTRAMUSCULAR; INTRAVENOUS at 07:33

## 2022-07-20 RX ADMIN — PROPOFOL 20 MG: 10 INJECTION, EMULSION INTRAVENOUS at 07:35

## 2022-07-20 RX ADMIN — DEXAMETHASONE SODIUM PHOSPHATE 10 MG: 4 INJECTION, SOLUTION INTRA-ARTICULAR; INTRALESIONAL; INTRAMUSCULAR; INTRAVENOUS; SOFT TISSUE at 09:32

## 2022-07-20 RX ADMIN — MEPERIDINE HYDROCHLORIDE 12.5 MG: 25 INJECTION INTRAMUSCULAR; INTRAVENOUS; SUBCUTANEOUS at 10:54

## 2022-07-20 RX ADMIN — Medication 5 MG: at 07:50

## 2022-07-20 RX ADMIN — PROPOFOL 20 MG: 10 INJECTION, EMULSION INTRAVENOUS at 08:42

## 2022-07-20 RX ADMIN — Medication 5 MG: at 07:53

## 2022-07-20 RX ADMIN — SUGAMMADEX 200 MG: 100 INJECTION, SOLUTION INTRAVENOUS at 10:20

## 2022-07-20 RX ADMIN — PROPOFOL 100 MG: 10 INJECTION, EMULSION INTRAVENOUS at 09:06

## 2022-07-20 RX ADMIN — PROPOFOL 200 MCG/KG/MIN: 10 INJECTION, EMULSION INTRAVENOUS at 07:35

## 2022-07-20 RX ADMIN — SODIUM CHLORIDE, POTASSIUM CHLORIDE, SODIUM LACTATE AND CALCIUM CHLORIDE: 600; 310; 30; 20 INJECTION, SOLUTION INTRAVENOUS at 06:57

## 2022-07-20 RX ADMIN — PROPOFOL 20 MG: 10 INJECTION, EMULSION INTRAVENOUS at 08:08

## 2022-07-20 RX ADMIN — LEVOFLOXACIN 500 MG: 5 INJECTION, SOLUTION INTRAVENOUS at 11:40

## 2022-07-20 RX ADMIN — PROPOFOL 30 MG: 10 INJECTION, EMULSION INTRAVENOUS at 07:33

## 2022-07-20 NOTE — OR NURSING
Instructed pt and son for pt to wear CPAP as mucha  Possible today, but especially when resting or sleeping.. Verbalized understanding.

## 2022-07-20 NOTE — DISCHARGE INSTRUCTIONS
Discharge Instructions: After Your Surgery  You ve just had surgery. During surgery, you were given medicine called anesthesia to keep you relaxed and free of pain. After surgery, you may have some pain or nausea. This is common. Here are some tips for feeling better and getting well after surgery.     Stay on schedule with your medicine.   Going home  Your healthcare provider will show you how to take care of yourself when you go home. He or she will also answer your questions. Have an adult family member or friend drive you home. For the first 24 hours after your surgery:  Don't drive or use heavy equipment.  Don't make important decisions or sign legal papers.  Don't drink alcohol.  Have someone stay with you, if needed. He or she can watch for problems and help keep you safe.  Be sure to go to all follow-up visits with your healthcare provider. And rest after your surgery for as long as your healthcare provider tells you to.  Coping with pain  If you have pain after surgery, pain medicine will help you feel better. Take it as told, before pain becomes severe. Also, ask your healthcare provider or pharmacist about other ways to control pain. This might be with heat, ice, or relaxation. And follow any other instructions your surgeon or nurse gives you.  Tips for taking pain medicine  To get the best relief possible, remember these points:  Pain medicines can upset your stomach. Taking them with a little food may help.  Most pain relievers taken by mouth need at least 20 to 30 minutes to start to work.  Don't wait till your pain becomes severe before you take your medicine. Try to time your medicine so that you can take it before starting an activity. This might be before you get dressed, go for a walk, or sit down for dinner.  Constipation is a common side effect of pain medicines. Call your healthcare provider before taking any medicines such as laxatives or stool softeners to help ease constipation. Also ask  if you should skip any foods. Drinking lots of fluids and eating foods such as fruits and vegetables that are high in fiber can also help. Remember, don't take laxatives unless your surgeon has prescribed them.  Drinking alcohol and taking pain medicine can cause dizziness and slow your breathing. It can even be deadly. Don't drink alcohol while taking pain medicine.  Pain medicine can make you react more slowly to things. Don't drive or run machinery while taking pain medicine.  Your healthcare provider may tell you to take acetaminophen to help ease your pain. Ask him or her how much you are supposed to take each day. Acetaminophen or other pain relievers may interact with your prescription medicines or other over-the-counter (OTC) medicines. Some prescription medicines have acetaminophen and other ingredients. Using both prescription and OTC acetaminophen for pain can cause you to overdose. Read the labels on your OTC medicines with care. This will help you to clearly know the list of ingredients, how much to take, and any warnings. It may also help you not take too much acetaminophen. If you have questions or don't understand the information, ask your pharmacist or healthcare provider to explain it to you before you take the OTC medicine.  Managing nausea  Some people have an upset stomach after surgery. This is often because of anesthesia, pain, or pain medicine, or the stress of surgery. These tips will help you handle nausea and eat healthy foods as you get better. If you were on a special food plan before surgery, ask your healthcare provider if you should follow it while you get better. These tips may help:  Don't push yourself to eat. Your body will tell you when to eat and how much.  Start off with clear liquids and soup. They are easier to digest.  Next try semi-solid foods, such as mashed potatoes, applesauce, and gelatin, as you feel ready.  Slowly move to solid foods. Don t eat fatty, rich, or spicy  foods at first.  Don't force yourself to have 3 large meals a day. Instead eat smaller amounts more often.  Take pain medicines with a small amount of solid food, such as crackers or toast, to prevent nausea.  When to call your healthcare provider  Call your healthcare provider if:  You still have intolerable pain an hour after taking medicine. The medicine may not be strong enough.  You feel too sleepy, dizzy, or groggy. The medicine may be too strong.  You have side effects such as nausea or vomiting, or skin changes such as rash, itching, or hives. Your healthcare provider may suggest other medicines to control side effects.  Rash, itching, or hives may mean you have an allergic reaction. Report this right away. If you have trouble breathing or facial swelling, call 911 right away.  If you have obstructive sleep apnea  You were given anesthesia medicine during surgery to keep you comfortable and free of pain. After surgery, you may have more apnea spells because of this medicine and other medicines you were given. The spells may last longer than usual.   At home:  Keep using the continuous positive airway pressure (CPAP) device when you sleep. Unless your healthcare provider tells you not to, use it when you sleep, day or night. CPAP is a common device used to treat obstructive sleep apnea.  Talk with your provider before taking any pain medicine, muscle relaxants, or sedatives. Your provider will tell you about the possible dangers of taking these medicines.  [employer] last reviewed this educational content on 3/1/2019    2620-7910 [employer]. All rights reserved. This information is not intended as a substitute for professional medical care. Always follow your healthcare professional's instructions.

## 2022-07-20 NOTE — OR NURSING
Updated Dr. Benavidez of pt's shaking/shivers, blood pressure increase, and increased oxygen needs.  No new orders, will continue to monitor.

## 2022-07-20 NOTE — ANESTHESIA POSTPROCEDURE EVALUATION
Patient: Sina Gomez    Procedure: Procedure(s):  COLONOSCOPY       Anesthesia Type:  MAC    Note:  Disposition: Outpatient   Postop Pain Control: Uneventful            Sign Out: Well controlled pain   PONV: No   Neuro/Psych: Uneventful            Sign Out: Acceptable/Baseline neuro status   Airway/Respiratory: Uneventful            Sign Out: Acceptable/Baseline resp. status   CV/Hemodynamics: Uneventful            Sign Out: Acceptable CV status; No obvious hypovolemia; No obvious fluid overload   Other NRE: NONE   DID A NON-ROUTINE EVENT OCCUR? No           Last vitals:  Vitals Value Taken Time   /76 07/20/22 1145   Temp 37.3  C (99.1  F) 07/20/22 1145   Pulse 74 07/20/22 1146   Resp 34 07/20/22 1146   SpO2 93 % 07/20/22 1146   Vitals shown include unvalidated device data.    Electronically Signed By: Nazario Benavidez MD  July 20, 2022  2:02 PM

## 2022-07-20 NOTE — ANESTHESIA PROCEDURE NOTES
Airway       Patient location during procedure: OR       Procedure Start/Stop Times: 7/20/2022 9:06 AM  Staff -        CRNA: Gilles Black APRN CRNA       Performed By: CRNA  Consent for Airway        Urgency: elective  Indications and Patient Condition       Indications for airway management: jaja-procedural       Induction type:intravenous       Mask difficulty assessment: 1 - vent by mask    Final Airway Details       Final airway type: endotracheal airway       Successful airway: ETT - single and Oral  Endotracheal Airway Details        ETT size (mm): 7.5       Cuffed: yes       Successful intubation technique: direct laryngoscopy and video laryngoscopy       VL Blade Size: Glidescope 3       Grade View of Cords: 1       Adjucts: stylet       Position: Right       Measured from: gums/teeth       Secured at (cm): 23       Bite block used: None    Post intubation assessment        Placement verified by: capnometry, equal breath sounds and chest rise        Number of attempts at approach: 1       Secured with: silk tape       Ease of procedure: easy       Dentition: Intact and Unchanged    Medication(s) Administered   Medication Administration Time: 7/20/2022 9:06 AM

## 2022-07-20 NOTE — OP NOTE
COLONOSCOPY REPORT      Surgeon:  Dr. Dexter Schmitt  Surg Assist:  Dr. Juan Guardado, I needed his assist as I was unable to get to the cecum without his help.    Pre-op Dx:              Diagnostic colonoscopy, blood in the stool      Procedure:             Colonoscopy, with injection, hot snare biopsy polypectomy, polypectomy with cold biopsy forceps      Indications:            Blood in the stool      Findings:                12 polyps were found, polyps were found in the cecum and the ascending colon for the most part there were a few polyps noted at the hepatic flexure and some near the splenic flexure.  The patient had moderate diverticular disease.  The patient had a very challenging sigmoid colon to navigate.  With this it made it very difficult to get all around to the cecum.  It made even more difficult to do extensive polypectomy work in the cecum..    Procedure:              The patient is brought to the endoscopy suite placed in a lateral position and the patient is given conscious sedation anesthesia.  The colonoscope was advanced atraumatically into the anus taken all way up and around to the cecum.  The ileocecal valve and the appendiceal orifice are clearly identified.  The scope was slowly drawn back 12 polyps were identified.  They were found in the cecum where the largest polyp was noted deep in the cecum this was raised with normal saline injection and then removed with multiple bites using a hot snare.  I found multiple other polyps in the cecum as well these were all removed from the base with a hot snare and the larger polyps were divided piecemeal and aspirated up into the suction trap.  I then removed a few polyps at ascending colon #1 is just a centimeter or 2 out of the cecum these were removed with the hot snare and aspirated up into the trap and then removed another series of polyps in ascending colon #2 again these were removed with the hot snare and aspirated up and finally there is an  ascending colon area #3 or a few polyps were removed and aspirated.  I removed a few polyps at the hepatic flexure with a hot snare I removed a few polyps in the region of the splenic flexure with cold biopsy forceps and 1 with a hot snare.     The patient had moderate sigmoid diverticular disease.   The scope was drawn back into the rectum and retroflexed I do not see evidence for any significant hemorrhoidal disease.      withdrawal Time:  90 min    EBL:                       10 cc      Medications:         MAC; see anesthesia note for details, we started with a MAC anesthesia and as the surgery was proven to be quite challenging we converted to an endotracheal anesthesia.          Complications:      None      Post-op Dx:            Colonoscopy with multiple polyps in the cecum and ascending colon a few polyps in the transverse colon at the hepatic flexure and then closer to the splenic flexure.  The patient also had some diverticuli.      Recommendation:   Next Colonoscopy in 1-2 years; the year 2023 - 2024        Note to Coders:  This was a very difficult colonoscopy taking 2 hours.  3- 4 times my usual time for a colonoscopy please list this procedure with a Mod 22    Dexter Schmitt MD  7/20/2022 1:34 PM  [facility]  [phone]

## 2022-07-20 NOTE — H&P
PRE COLONOSCOPY EVALUATION   H&P       ASSESSMENT     Sina Gomez is a 81 year old male who is in today for a Colonoscopy secondary to rectal .  The patient has not  had a stool screening test.         PLAN      Screening Colonoscopy         HPI     Sina Gomez is a 81 year old male who presents for a colonoscopy.  They do not have a family history of colon cancer  They do have a history of polyps  They havet been loosing weight  They have not noted any change in bowel habits   They have had blood in their stool.         PAST MEDICAL HISTORY SURGICAL HISTORY     Past Medical History:   Diagnosis Date     Cerebral artery occlusion with cerebral infarction (H)      Coronary artery disease      Depression      Sleep apnea     Past Surgical History:   Procedure Laterality Date     CV FEMORAL ANGIOGRAM       HC VALVE (TAVR)       left hip replacement Left 1995     REPLACEMENT TOTAL KNEE Left           CURRENT MEDICATIONS ALLERGIES     No current outpatient medications on file.    Allergies   Allergen Reactions     Penicillins Difficulty breathing          FAMILY HISTORY   History reviewed. No pertinent family history.      SOCIAL HISTORY     Social History     Socioeconomic History     Marital status:      Spouse name: None     Number of children: None     Years of education: None     Highest education level: None   Tobacco Use     Smoking status: Former Smoker     Quit date:      Years since quittin.5     Smokeless tobacco: Never Used   Substance and Sexual Activity     Alcohol use: Never     Drug use: Never         REVIEW OF SYSTEMS       10 point review of systems are unremarkable except for the symptoms described in the HPI    PHYSICAL EXAM     VITAL SIGNS: BP (!) 156/70 (BP Location: Right arm)   Pulse 56   Temp 99  F (37.2  C) (Oral)   Resp 18   Wt 96.4 kg (212 lb 8 oz)   SpO2 93%   BMI 30.49 kg/m     General : Alert, cooperative, appears stated age   Head: Normocephalic,  without obvious abnormality,   HEENT:  conjunctiva/corneas clear, EOM's intact, no scleral icterus   Lungs: Clear to auscultation bilaterally, respirations unlabored  Heart: Regular rate and rhythm, S1, S2 normal, no murmur, rub or gallop   Abdomen: Soft, non-tender, no guarding, + bowel sounds active,   Extremities : No obvious swelling,  Neurologic:  moves all extremities to command, no focal findings    Airway: Class 2  ASA:   2      [facility]  [phone]

## 2022-07-20 NOTE — OR NURSING
Updated Dr. Benavidez on pt.'s status.  Pt. Has improved, sats 93% on RA, blood pressure improved, temperature increased slightly to 99.7, pt. No longer shivering, anesthesiologist aware will send pt. Home.

## 2022-07-20 NOTE — ANESTHESIA CARE TRANSFER NOTE
Patient: Sina Gomez    Procedure: Procedure(s):  COLONOSCOPY       Diagnosis: Screening for condition [Z13.9]  Diagnosis Additional Information: No value filed.    Anesthesia Type:   MAC     Note:    Oropharynx: oropharynx clear of all foreign objects and spontaneously breathing  Level of Consciousness: awake  Oxygen Supplementation: face mask  Level of Supplemental Oxygen (L/min / FiO2): 8  Independent Airway: airway patency satisfactory and stable  Dentition: dentition unchanged  Vital Signs Stable: post-procedure vital signs reviewed and stable  Report to RN Given: handoff report given  Patient transferred to: PACU    Handoff Report: Identifed the Patient, Identified the Reponsible Provider, Reviewed the pertinent medical history, Discussed the surgical course, Reviewed Intra-OP anesthesia mangement and issues during anesthesia, Set expectations for post-procedure period and Allowed opportunity for questions and acknowledgement of understanding      Vitals:  Vitals Value Taken Time   /78 07/20/22 1037   Temp 36.2  C (97.2  F) 07/20/22 1035   Pulse 82 07/20/22 1040   Resp 32 07/20/22 1040   SpO2 96 % 07/20/22 1040   Vitals shown include unvalidated device data.    Electronically Signed By: ALPESH Goss CRNA  July 20, 2022  10:41 AM

## 2022-07-20 NOTE — ANESTHESIA PREPROCEDURE EVALUATION
Anesthesia Pre-Procedure Evaluation    Patient: Sina Gomez   MRN: [id number] : 1940        Procedure : Procedure(s):  COLONOSCOPY          Past Medical History:   Diagnosis Date     Cerebral artery occlusion with cerebral infarction (H)      Coronary artery disease      Depression      Sleep apnea       Past Surgical History:   Procedure Laterality Date     CV FEMORAL ANGIOGRAM       HC VALVE (TAVR)       left hip replacement Left 1995     REPLACEMENT TOTAL KNEE Left       Allergies   Allergen Reactions     Penicillins Difficulty breathing      Social History     Tobacco Use     Smoking status: Former Smoker     Quit date:      Years since quittin.5     Smokeless tobacco: Never Used   Substance Use Topics     Alcohol use: Never      Wt Readings from Last 1 Encounters:   22 98 kg (216 lb)        Anesthesia Evaluation   Pt has had prior anesthetic. Type: MAC.    History of anesthetic complications  - PONV.      ROS/MED HX  ENT/Pulmonary:     (+) sleep apnea,     Neurologic:     (+) CVA, TIA,     Cardiovascular:     (+) --CAD ---valvular problems/murmurs     METS/Exercise Tolerance:  Comment: S/P TAVR   Hematologic:       Musculoskeletal:       GI/Hepatic:       Renal/Genitourinary:       Endo:     (+) thyroid problem, Obesity,     Psychiatric/Substance Use:       Infectious Disease:       Malignancy:   (+) Malignancy, History of Prostate.Prostate CA Active status post.        Other:            Physical Exam    Airway        Mallampati: II   TM distance: < 3 FB   Neck ROM: limited     Respiratory Devices and Support         Dental     Comment: Fair dentition.        Cardiovascular   cardiovascular exam normal          Pulmonary   pulmonary exam normal                OUTSIDE LABS:  CBC:   Lab Results   Component Value Date    WBC 5.2 2022    WBC 5.1 2022    HGB 12.5 (L) 2022    HGB 12.1 (L) 2022    HCT 40.4 2022    HCT 38.9 (L) 2022      07/14/2022     05/18/2022     BMP:   Lab Results   Component Value Date     07/14/2022     (H) 05/16/2022    POTASSIUM 4.1 07/14/2022    POTASSIUM 4.1 05/16/2022    CHLORIDE 106 07/14/2022    CHLORIDE 108 05/16/2022    CO2 33 (H) 07/14/2022    CO2 30 05/16/2022    BUN 20 07/14/2022    BUN 29 05/16/2022    CR 0.83 07/14/2022    CR 0.94 05/16/2022     (H) 07/14/2022     (H) 05/16/2022     COAGS:   Lab Results   Component Value Date    PTT 28 05/16/2022    INR 1.10 05/16/2022     POC: No results found for: BGM, HCG, HCGS  HEPATIC:   Lab Results   Component Value Date    ALBUMIN 3.1 (L) 07/14/2022    PROTTOTAL 6.4 (L) 07/14/2022    ALT 28 07/14/2022    AST 28 07/14/2022    ALKPHOS 87 07/14/2022    BILITOTAL 0.5 07/14/2022     OTHER:   Lab Results   Component Value Date    MARCIA 8.9 07/14/2022    TSH 3.76 04/21/2022    T4 0.88 03/31/2022       Anesthesia Plan    ASA Status:  3   NPO Status:  NPO Appropriate    Anesthesia Type: MAC.      Maintenance: TIVA.        Consents    Anesthesia Plan(s) and associated risks, benefits, and realistic alternatives discussed. Questions answered and patient/representative(s) expressed understanding.    - Discussed:     - Discussed with:  Patient      - Extended Intubation/Ventilatory Support Discussed: No.      - Patient is DNR/DNI Status: No    Use of blood products discussed: No .     Postoperative Care       PONV prophylaxis: Ondansetron (or other 5HT-3), Dexamethasone or Solumedrol     Comments:    Other Comments: Decadron, Zofran.  Diprivan infusion.  PE, Ephedrine PRN.            Nazario Benavidez MD

## 2022-07-21 LAB
PATH REPORT.COMMENTS IMP SPEC: NORMAL
PATH REPORT.FINAL DX SPEC: NORMAL
PATH REPORT.GROSS SPEC: NORMAL
PATH REPORT.MICROSCOPIC SPEC OTHER STN: NORMAL
PATH REPORT.RELEVANT HX SPEC: NORMAL
PHOTO IMAGE: NORMAL

## 2022-07-21 PROCEDURE — 88305 TISSUE EXAM BY PATHOLOGIST: CPT | Mod: 26 | Performed by: PATHOLOGY

## 2022-07-21 ASSESSMENT — SLEEP AND FATIGUE QUESTIONNAIRES
HOW LIKELY ARE YOU TO NOD OFF OR FALL ASLEEP WHEN YOU ARE A PASSENGER IN A CAR FOR AN HOUR WITHOUT A BREAK: MODERATE CHANCE OF DOZING
HOW LIKELY ARE YOU TO NOD OFF OR FALL ASLEEP WHILE SITTING QUIETLY AFTER LUNCH WITHOUT ALCOHOL: SLIGHT CHANCE OF DOZING
HOW LIKELY ARE YOU TO NOD OFF OR FALL ASLEEP WHILE LYING DOWN TO REST IN THE AFTERNOON WHEN CIRCUMSTANCES PERMIT: MODERATE CHANCE OF DOZING
HOW LIKELY ARE YOU TO NOD OFF OR FALL ASLEEP WHILE SITTING INACTIVE IN A PUBLIC PLACE: MODERATE CHANCE OF DOZING
HOW LIKELY ARE YOU TO NOD OFF OR FALL ASLEEP WHILE SITTING AND TALKING TO SOMEONE: SLIGHT CHANCE OF DOZING
HOW LIKELY ARE YOU TO NOD OFF OR FALL ASLEEP WHILE SITTING AND READING: MODERATE CHANCE OF DOZING
HOW LIKELY ARE YOU TO NOD OFF OR FALL ASLEEP WHILE WATCHING TV: MODERATE CHANCE OF DOZING
HOW LIKELY ARE YOU TO NOD OFF OR FALL ASLEEP IN A CAR, WHILE STOPPED FOR A FEW MINUTES IN TRAFFIC: WOULD NEVER DOZE

## 2022-07-27 ENCOUNTER — TRANSFERRED RECORDS (OUTPATIENT)
Dept: HEALTH INFORMATION MANAGEMENT | Facility: CLINIC | Age: 82
End: 2022-07-27

## 2022-07-27 ENCOUNTER — OFFICE VISIT (OUTPATIENT)
Dept: SLEEP MEDICINE | Facility: CLINIC | Age: 82
End: 2022-07-27
Attending: FAMILY MEDICINE
Payer: MEDICARE

## 2022-07-27 VITALS
BODY MASS INDEX: 30.64 KG/M2 | HEART RATE: 60 BPM | HEIGHT: 70 IN | SYSTOLIC BLOOD PRESSURE: 133 MMHG | OXYGEN SATURATION: 95 % | WEIGHT: 214 LBS | DIASTOLIC BLOOD PRESSURE: 64 MMHG | RESPIRATION RATE: 16 BRPM

## 2022-07-27 DIAGNOSIS — F11.90 CHRONIC, CONTINUOUS USE OF OPIOIDS: ICD-10-CM

## 2022-07-27 DIAGNOSIS — F33.41 RECURRENT MAJOR DEPRESSIVE DISORDER, IN PARTIAL REMISSION (H): ICD-10-CM

## 2022-07-27 DIAGNOSIS — G47.33 OSA (OBSTRUCTIVE SLEEP APNEA): Primary | ICD-10-CM

## 2022-07-27 DIAGNOSIS — I63.9 CEREBROVASCULAR ACCIDENT (CVA), UNSPECIFIED MECHANISM (H): ICD-10-CM

## 2022-07-27 PROCEDURE — 99204 OFFICE O/P NEW MOD 45 MIN: CPT | Performed by: PHYSICIAN ASSISTANT
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
## 2022-07-27 NOTE — PATIENT INSTRUCTIONS
Your sleep apnea treatment may be affected by device recall    Our records show that you may have a Jonatan Respironics CPAP for the treatment of sleep apnea. Many of these devices have been recalled* by the  for replacement. [facility] recommends:     1) If you are using a Resmed device, continue using the device.  2) If you have a Jonatan Respironics device, register your device for confirmation of type of device and repair of the device at [email or web address] -if you cannot use link, call [phone].  The website will assist you in obtaining the serial number for registration.   3) If you are using a Jonatan Respironics CPAP or Bilevel PAP device and you do not have immediate breathing, driving or cardiovascular risks without the device, consider stopping use of the device after verification that is has been recalled. Discuss this decision with your medical provider if you are uncertain about your medical risks.  4) If you are not using Respironics CPAP but are using a Respironics advanced device for breathing support (AVAPS, ASV, Bilevel PAP), continue using the device and review 5 and 6 below).     5) If you continue the device, do not include ozone generating  connected to PAP devices.  6) Bacterial filters to reduce exposure to particulates are sometimes cumbersome to use and are not currently recommended by the .    ?       You may also choose discuss with your provider alternative approaches to treatment.      *[employer] is voluntarily recalling the below devices due to two (2) issues related to the polyester-based polyurethane (PE-PUR) sound abatement foam used in Jonatan Continuous and Non-Continuous Ventilators: 1) PE-PUR foam may degrade into particles which may enter the device's the air pathway and be ingested or inhaled by the user, and 2) the PE-PUR foam may off-gas certain chemicals. The  foam degradation may be exacerbated by use of unapproved cleaning methods, such as ozone (see FDA safety communication on use of Ozone ), and off-gassing may occur during initial operation and may possibly continue throughout the device's useful life.   These issues can result in serious injury which can be life-threatening, cause permanent impairment, and/or require medical intervention to preclude permanent impairment. To date, [employer] has received several complaints regarding the presence of black debris/particles within the airpath circuit (extending from the device outlet, humidifier, tubing, and mask). [employer]atan also has received reports of headache, upper airway irritation, cough, chest pressure and sinus infection. The potential risks of particulate exposure include: Irritation (skin, eye, and respiratory tract), inflammatory response, headache, asthma, adverse effects to other organs (e.g. kidneys and liver) and toxic carcinogenic affects. The potential risks of chemical exposure due to off-gassing include: headache/dizziness, irritation (eyes, nose, respiratory tract, skin), hypersensitivity, nausea/vomiting, toxic and carcinogenic effects. There have been no reports of death as a result of these issues.    Actions to be taken:  Discontinue the use of your device.  Do not continue to use ozone  with the device.     [place] affected devices on the recall website, www.Silenseed.com/SRC-update    i. The website provides current information on the status of the recall and how  to receive permanent corrective action to address the two issues.    ii. The website also provides instructions on how to locate an affected device  Serial Number and will guide users through the registration process.    iii. In the , call [phone] Service Hotline if you cannot visit the website

## 2022-07-28 NOTE — NURSING NOTE
Patient scheduled for both Psg/split night at  sleep, and a return video visit for the results.  Patient and his Son were given the instructions for sleep study and explained to have a covid19 home test the morning of the study.  His son requested all instructions be sent via [employer] as well.  This was done.  Patient signed YADY which was faxed to [facility] for any sleep , pulmonary, cardiology records.  FAX:  [phone]  YADY scanned into epic.

## 2022-08-02 ENCOUNTER — MEDICAL CORRESPONDENCE (OUTPATIENT)
Dept: SLEEP MEDICINE | Facility: CLINIC | Age: 82
End: 2022-08-02

## 2022-08-02 ENCOUNTER — CARE COORDINATION (OUTPATIENT)
Dept: SLEEP MEDICINE | Facility: CLINIC | Age: 82
End: 2022-08-02

## 2022-08-02 DIAGNOSIS — G47.33 OSA (OBSTRUCTIVE SLEEP APNEA): Primary | ICD-10-CM

## 2022-08-02 NOTE — PROGRESS NOTES
Scan of split night study done at [facility] [place] sleep [facility] [place] 3/24/2014.  Routed to Chante Mena PA-C, for review. Patient scheduled for split night 10/12/2022 at  Sleep .

## 2022-08-02 NOTE — TELEPHONE ENCOUNTER
Reviewed split night PSG completed 3/23/2014 at 217#, BMI 33. During diagnostic portion of testing AHI 50.0, RDI 89.6, SpO2 meg 70%, 23.5 minutes spent less than or equal to 88%, PLM index 15.4 with arousal 0.4.  During titration sleep apnea and hypoxemia resolved with residual AHI 5.3, PLM index 32 with arousal index 13.5.  He was titrated with both CPAP and BiPAP but ultimate recommendation was CPAP 9-11 cm H2O.     Given stroke, daily opioids we will continue with our current plan to update sleep study with split-night PSG scheduled for October.  Since we have a copy of the last study however I will place an order for mask and supplies today to get him established with [facility] as he still has functional machine at home, though affected by Respironics recall, that he can re-start regular use with a new mask. Will keep current plan to follow-up 1-2 weeks after PSG for discussion of results. Chante Mena PA-C

## 2022-08-12 DIAGNOSIS — F11.90 CHRONIC, CONTINUOUS USE OF OPIOIDS: ICD-10-CM

## 2022-08-15 NOTE — TELEPHONE ENCOUNTER
Routing refill request to provider for review/approval because:  Drug not on the [facility] refill protocol   Mariaa Saucedo RN

## 2022-08-16 RX ORDER — OXYCODONE 13.5 MG/1
CAPSULE, EXTENDED RELEASE ORAL
Qty: 30 CAPSULE | Refills: 0 | Status: SHIPPED | OUTPATIENT
Start: 2022-08-16 | End: 2022-08-30

## 2022-08-18 DIAGNOSIS — F11.90 CHRONIC, CONTINUOUS USE OF OPIOIDS: ICD-10-CM

## 2022-08-18 RX ORDER — OXYCODONE 9 MG/1
CAPSULE, EXTENDED RELEASE ORAL
Qty: 30 CAPSULE | Refills: 0 | Status: CANCELLED | OUTPATIENT
Start: 2022-08-18

## 2022-08-18 NOTE — TELEPHONE ENCOUNTER
Sophia from [facility] where the patient resides says he is quickly running out ot the Xtampza ER 9 mg.   He also takes a 13.5 mg but that one was sent in 8/16/22.

## 2022-08-21 RX ORDER — OXYCODONE 9 MG/1
9 CAPSULE, EXTENDED RELEASE ORAL DAILY
Qty: 30 CAPSULE | Refills: 0 | Status: SHIPPED | OUTPATIENT
Start: 2022-08-21 | End: 2022-09-20

## 2022-08-29 DIAGNOSIS — I25.718 CORONARY ARTERY DISEASE OF AUTOLOGOUS VEIN BYPASS GRAFT WITH STABLE ANGINA PECTORIS (H): ICD-10-CM

## 2022-08-29 NOTE — TELEPHONE ENCOUNTER
Routing refill request to provider for review/approval because:  Patient needs to be seen because:  Due for annual exam    Kike Flynn RN

## 2022-08-30 RX ORDER — FUROSEMIDE 20 MG
TABLET ORAL
Qty: 30 TABLET | Refills: 1 | Status: SHIPPED | OUTPATIENT
Start: 2022-08-30 | End: 2022-11-04

## 2022-09-06 DIAGNOSIS — F11.90 CHRONIC, CONTINUOUS USE OF OPIOIDS: ICD-10-CM

## 2022-09-07 NOTE — TELEPHONE ENCOUNTER
Call from Sophia with [employer] transferred to author  Call back number: [phone]     Sophia calling stating patient has been out of Oxycodone 5mg tablet for just over a week   States she has reached out to Yanira Bhatti who states multiple faxes have been sent requesting refill with no response from provider     Oxycodone 5mg tablet  Sig: Take 1/2 tablet daily at HS and Q6H PRN     Sophia states that miscommunication between pharmacy and clinic occurs monthly and patient has been out of medication for a period of time every month     Will forward high priority to Dr. White to review    Kike Flynn RN

## 2022-09-07 NOTE — TELEPHONE ENCOUNTER
Routing refill request to provider for review/approval because:  Drug not on the [facility] refill protocol     Kike Flynn RN

## 2022-09-08 ENCOUNTER — TELEPHONE (OUTPATIENT)
Dept: FAMILY MEDICINE | Facility: CLINIC | Age: 82
End: 2022-09-08

## 2022-09-08 RX ORDER — OXYCODONE HYDROCHLORIDE 5 MG/1
TABLET ORAL
Qty: 45 TABLET | Refills: 0 | Status: SHIPPED | OUTPATIENT
Start: 2022-09-08 | End: 2022-10-05

## 2022-09-08 NOTE — TELEPHONE ENCOUNTER
Dr. White:    Received a call from Maryann from [facility] ([phone]) who is asking you to refill the patient's Oxycodone 5 mg, patient has been out for over a week, the refill was sent to you, please advise.      JOSELYN Guerrier

## 2022-09-08 NOTE — TELEPHONE ENCOUNTER
Dr. White:    Please see Kike's note below, Kike already confirmed the was Oxycodone 5 mg tablet to take 1/2 tablet daily at HS and Q6 hours PRN.      JOSELYN Guerrier

## 2022-09-08 NOTE — TELEPHONE ENCOUNTER
Can you please contact them . The instruction on the medication that I was given was 2.5  mg at hs. I do not know how often he takes this during the day. He takes the extended release 3 times per day so he should be covered. Please see what they want me to do. Brionna White M.D.

## 2022-09-09 NOTE — TELEPHONE ENCOUNTER
Son Rajiv called the clinic to check on the status of the medication. Dr. White filled the Oxycodone 5 mg yesterday, Rajiv is notified.    Nurse Joy at [facility] is notified, she wants to have it noted that patient was out for 2 weeks, Joy is told that the clinic has already noted it, see message below.      JOSELYN uGerrier

## 2022-09-16 DIAGNOSIS — F11.90 CHRONIC, CONTINUOUS USE OF OPIOIDS: ICD-10-CM

## 2022-09-19 RX ORDER — OXYCODONE 13.5 MG/1
CAPSULE, EXTENDED RELEASE ORAL
Qty: 60 CAPSULE | Refills: 0 | Status: SHIPPED | OUTPATIENT
Start: 2022-09-19 | End: 2022-10-27

## 2022-09-20 ENCOUNTER — TELEPHONE (OUTPATIENT)
Dept: FAMILY MEDICINE | Facility: CLINIC | Age: 82
End: 2022-09-20

## 2022-09-20 DIAGNOSIS — F11.90 CHRONIC, CONTINUOUS USE OF OPIOIDS: ICD-10-CM

## 2022-09-20 RX ORDER — OXYCODONE 9 MG/1
9 CAPSULE, EXTENDED RELEASE ORAL DAILY
Qty: 30 CAPSULE | Refills: 0 | Status: SHIPPED | OUTPATIENT
Start: 2022-09-20 | End: 2022-10-15

## 2022-09-20 NOTE — TELEPHONE ENCOUNTER
Spoke with Joy at [facility]. She said that Sina is on both a 13 mg dose and a 9 mg dose of Xtampza; previously ordered by Latisha Garcia NP; who saw Sina at [facility]. Joy said that Sina has switched his PCP from Latisha to Dr. White.  Joy knows that the 13 mg has been ordered yesterday.   How do you advise 9 mg xtampza refill request?  Thank you.  Link Wasserman RN

## 2022-09-20 NOTE — TELEPHONE ENCOUNTER
Reason for Call:  Other prescription    Detailed comments: Joy with [facility] is calling on the following medication. Pt is down to 1 tab.  Stating has not received this or a call from the pharmacy.     Disp Refills Start End AMIRA   XTAMPZA ER 13.5 MG 12 hr tablet 60 capsule 0 9/19/2022  No   Sig: TAKE 1 CAP BY MOUTH TWICE DAILY FOR CHRONIC PAIN   Sent to pharmacy as: Xtampza ER 13.5 MG Oral Capsule ER 12 Hour Abuse-Deterrent (oxyCODONE)   Class: E-Prescribe   Earliest Fill Date: 9/19/2022   Notes to Pharmacy: REQUESTING A NEW ORDER PLEASE, NO REFILLS REMAIN.   Order: [id number]   E-Prescribing Status: Receipt confirmed by pharmacy (9/19/2022 12:17 PM CDT)         Phone Number Patient can be reached at: Other phone number:  Joy is at [phone]    Best Time: any    Can we leave a detailed message on this number? YES    Call taken on 9/20/2022 at 2:48 PM by Evy Conde

## 2022-09-20 NOTE — TELEPHONE ENCOUNTER
I sent that in. I sent an order for 60 of the 13.5 but it looks like he was only given 30. Is there a reason for this? Brionna White M.D.

## 2022-09-21 NOTE — TELEPHONE ENCOUNTER
Tried calling Joy at [facility] and it rang and rang. No answer. She will need to be called a different time.  Thank you.  Link Wasserman RN

## 2022-09-26 DIAGNOSIS — I25.718 CORONARY ARTERY DISEASE OF AUTOLOGOUS VEIN BYPASS GRAFT WITH STABLE ANGINA PECTORIS (H): ICD-10-CM

## 2022-09-26 DIAGNOSIS — E03.4 HYPOTHYROIDISM DUE TO ACQUIRED ATROPHY OF THYROID: ICD-10-CM

## 2022-09-27 RX ORDER — LEVOTHYROXINE SODIUM 50 UG/1
TABLET ORAL
Qty: 90 TABLET | Refills: 1 | Status: SHIPPED | OUTPATIENT
Start: 2022-09-27 | End: 2023-10-05

## 2022-09-27 RX ORDER — OLMESARTAN MEDOXOMIL 20 MG/1
TABLET ORAL
Qty: 90 TABLET | Refills: 1 | Status: SHIPPED | OUTPATIENT
Start: 2022-09-27 | End: 2023-10-05

## 2022-09-28 DIAGNOSIS — E78.5 HYPERLIPIDEMIA LDL GOAL <100: Primary | ICD-10-CM

## 2022-09-28 NOTE — TELEPHONE ENCOUNTER
Routing refill request to provider for review/approval because:  Medication is reported/historical    Kike Flynn RN    
flat v3-4, poor R wave progression

## 2022-09-29 RX ORDER — ATORVASTATIN CALCIUM 40 MG/1
TABLET, FILM COATED ORAL
Qty: 135 TABLET | Refills: 3 | Status: SHIPPED | OUTPATIENT
Start: 2022-09-29 | End: 2023-08-29

## 2022-10-05 DIAGNOSIS — F11.90 CHRONIC, CONTINUOUS USE OF OPIOIDS: ICD-10-CM

## 2022-10-05 RX ORDER — OXYCODONE HYDROCHLORIDE 5 MG/1
TABLET ORAL
Qty: 45 TABLET | Refills: 0 | Status: SHIPPED | OUTPATIENT
Start: 2022-10-05 | End: 2022-11-10

## 2022-10-12 DIAGNOSIS — F11.90 CHRONIC, CONTINUOUS USE OF OPIOIDS: ICD-10-CM

## 2022-10-15 RX ORDER — OXYCODONE 9 MG/1
CAPSULE, EXTENDED RELEASE ORAL
Qty: 30 CAPSULE | Refills: 0 | Status: SHIPPED | OUTPATIENT
Start: 2022-10-15 | End: 2022-11-07

## 2022-10-27 DIAGNOSIS — F11.90 CHRONIC, CONTINUOUS USE OF OPIOIDS: ICD-10-CM

## 2022-10-27 RX ORDER — OXYCODONE 13.5 MG/1
CAPSULE, EXTENDED RELEASE ORAL
Qty: 60 CAPSULE | Refills: 0 | Status: SHIPPED | OUTPATIENT
Start: 2022-10-27 | End: 2022-12-02

## 2022-10-31 ENCOUNTER — OFFICE VISIT (OUTPATIENT)
Dept: NEUROLOGY | Facility: CLINIC | Age: 82
End: 2022-10-31
Payer: MEDICARE

## 2022-10-31 DIAGNOSIS — F02.80 MAJOR NEUROCOGNITIVE DISORDER DUE TO ALZHEIMER'S DISEASE (H): Primary | ICD-10-CM

## 2022-10-31 DIAGNOSIS — Z87.820 HX OF TRAUMATIC BRAIN INJURY: ICD-10-CM

## 2022-10-31 DIAGNOSIS — G30.9 MAJOR NEUROCOGNITIVE DISORDER DUE TO ALZHEIMER'S DISEASE (H): Primary | ICD-10-CM

## 2022-10-31 DIAGNOSIS — R41.89 SUBJECTIVE MEMORY COMPLAINTS: ICD-10-CM

## 2022-10-31 PROCEDURE — 96121 NUBHVL XM PHY/QHP EA ADDL HR: CPT | Performed by: CLINICAL NEUROPSYCHOLOGIST

## 2022-10-31 PROCEDURE — 96133 NRPSYC TST EVAL PHYS/QHP EA: CPT | Performed by: CLINICAL NEUROPSYCHOLOGIST

## 2022-10-31 PROCEDURE — 96132 NRPSYC TST EVAL PHYS/QHP 1ST: CPT | Performed by: CLINICAL NEUROPSYCHOLOGIST

## 2022-10-31 PROCEDURE — 96139 PSYCL/NRPSYC TST TECH EA: CPT | Performed by: CLINICAL NEUROPSYCHOLOGIST

## 2022-10-31 PROCEDURE — 96138 PSYCL/NRPSYC TECH 1ST: CPT | Performed by: CLINICAL NEUROPSYCHOLOGIST

## 2022-10-31 PROCEDURE — 96116 NUBHVL XM PHYS/QHP 1ST HR: CPT | Performed by: CLINICAL NEUROPSYCHOLOGIST
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
## 2022-11-01 NOTE — PROGRESS NOTES
The patient was seen for a neuropsychological evaluation for the purposes of diagnostic clarification and treatment planning. 80 minutes of face-to-face testing were provided by this writer. An additional 15 minutes were spent scoring and compiling test results. The patient was cooperative with testing. No concerns were brought to my attention. Please see Dr. Murrieta's report for a detailed description of the charges and interpretation and integration of the findings.

## 2022-11-02 DIAGNOSIS — F11.90 CHRONIC, CONTINUOUS USE OF OPIOIDS: ICD-10-CM

## 2022-11-03 DIAGNOSIS — I25.718 CORONARY ARTERY DISEASE OF AUTOLOGOUS VEIN BYPASS GRAFT WITH STABLE ANGINA PECTORIS (H): ICD-10-CM

## 2022-11-04 RX ORDER — FUROSEMIDE 20 MG
TABLET ORAL
Qty: 30 TABLET | Refills: 1 | Status: SHIPPED | OUTPATIENT
Start: 2022-11-04 | End: 2022-12-20

## 2022-11-07 PROCEDURE — U0005 INFEC AGEN DETEC AMPLI PROBE: HCPCS | Mod: ORL | Performed by: NURSE PRACTITIONER

## 2022-11-07 RX ORDER — OXYCODONE 13.5 MG/1
CAPSULE, EXTENDED RELEASE ORAL
Qty: 60 CAPSULE | Refills: 0 | OUTPATIENT
Start: 2022-11-07

## 2022-11-07 RX ORDER — OXYCODONE 9 MG/1
CAPSULE, EXTENDED RELEASE ORAL
Qty: 30 CAPSULE | Refills: 0 | Status: SHIPPED | OUTPATIENT
Start: 2022-11-07 | End: 2022-12-02

## 2022-11-08 ENCOUNTER — LAB REQUISITION (OUTPATIENT)
Dept: LAB | Facility: CLINIC | Age: 82
End: 2022-11-08
Payer: MEDICARE

## 2022-11-08 DIAGNOSIS — Z03.818 ENCOUNTER FOR OBSERVATION FOR SUSPECTED EXPOSURE TO OTHER BIOLOGICAL AGENTS RULED OUT: ICD-10-CM

## 2022-11-08 DIAGNOSIS — F11.90 CHRONIC, CONTINUOUS USE OF OPIOIDS: ICD-10-CM

## 2022-11-08 LAB — SARS-COV-2 RNA RESP QL NAA+PROBE: POSITIVE

## 2022-11-08 NOTE — PATIENT INSTRUCTIONS
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 Gomez should continue to refrain from driving.   Mr. Gomez will continue to benefit from help in his daily activities particularly in terms of managing medications and finances. Further, he will likely need increasingly more assistance over time.   Mr. Gomez will continue to benefit from the increased supervision and support as provided by his assisted living facility.   Family Resources  It will be increasingly important for Mr. Gomez and his family to have a strong support network in place. The Alzheimer s Association (http://www.alz.org/mnnd 1-335.175.6302) has information about local support groups as well as informational materials.   Physical and Emotional Health  If not medically contraindicated, Mr. Gomez may benefit from a trial of a cognitive enhancing medication (I.e., donepezil). He is encouraged to speak with his pcp or Neurologist about this.   It is important that Mr. Gomez continue to adhere to his medication treatment regimen and follow a healthy diet so as to maintain his physical health, as this can have a significant impact on his physical, emotional, and cognitive functioning.   Under the guidance of his physician, It is recommended that regular exercise be integrated into Mr. Gomez's routine as it will likely benefit him cognitively and emotionally as well as physically.  Mr. Gomez endorsed mild depressive symptoms. Given his significant memory impairment, psychotherapy would likely not be as helpful. However, behavioral activation techniques such as regular exercise, recreational activities  and regular social interaction would likely be effective in helping to manage his mood.   Adjustments to his mood medications (Buspar and Zoloft) could also be considered. Mr. Gomez should speak to his PCP or Neurologist about this if he is interested.   Memory and Organization  Mr. Gomez is encouraged to continue to engage in stimulating activities, (i.e., reading, card games, puzzles) to keep him cognitively active.   In his daily life, Mr. Gomez will continue to benefit from the use of compensation techniques. That is, he may find it helpful to post reminder notes around the house, make lists, and carry a small calendar so that he can feel more comfortable and confident in his ability to remember information. A daily planner could also be used as a memory book where important information is recorded and organized for future reference.   Mr. Gomez should also create a system to establish set locations for certain items (i.e., keys) such that he always knows where to put them upon entering the house and where to look when he needs them. If he would like to keep certain items out of sight (i.e., a wallet), he could set up a specific hidden place to keep items and use that same place so as to ensure he can find the required item when needed.   Mr. Gomez will do best in an environment where a lot of routines are established so that he can follow a regular pattern for his daily or weekly routines. He will likely become easily confused when deviating from this routine.   Mr. Gomez demonstrates intact basic attention but profound memory impairments, thus, he should not be given instructions for tasks any more than a few minutes in the future.  Mr. Gomez should be aware that he may not be able to process information as quickly and efficiently as he once could. Thus he should allow himself extra time to complete tasks and not try and work under extreme time constraints.   Follow-up  Given the severity  of Mr. Gomez's impairments, Neuropsychological re-evaluation may not be helpful in clarifying etiology. As such, future testing is not recommended unless clinically indicated by his care team.

## 2022-11-10 RX ORDER — OXYCODONE HYDROCHLORIDE 5 MG/1
TABLET ORAL
Qty: 45 TABLET | Refills: 0 | Status: SHIPPED | OUTPATIENT
Start: 2022-11-10 | End: 2022-12-30

## 2022-11-25 ENCOUNTER — VIRTUAL VISIT (OUTPATIENT)
Dept: FAMILY MEDICINE | Facility: CLINIC | Age: 82
End: 2022-11-25
Payer: MEDICARE

## 2022-11-25 DIAGNOSIS — M25.562 CHRONIC PAIN OF LEFT KNEE: ICD-10-CM

## 2022-11-25 DIAGNOSIS — F41.1 GENERALIZED ANXIETY DISORDER: ICD-10-CM

## 2022-11-25 DIAGNOSIS — G89.29 CHRONIC PAIN OF LEFT KNEE: ICD-10-CM

## 2022-11-25 DIAGNOSIS — G30.9 MAJOR NEUROCOGNITIVE DISORDER DUE TO ALZHEIMER'S DISEASE, WITHOUT BEHAVIORAL DISTURBANCE (H): Primary | ICD-10-CM

## 2022-11-25 DIAGNOSIS — F33.41 RECURRENT MAJOR DEPRESSIVE DISORDER, IN PARTIAL REMISSION (H): ICD-10-CM

## 2022-11-25 DIAGNOSIS — F02.80 MAJOR NEUROCOGNITIVE DISORDER DUE TO ALZHEIMER'S DISEASE, WITHOUT BEHAVIORAL DISTURBANCE (H): Primary | ICD-10-CM

## 2022-11-25 PROCEDURE — 99442 PR PHYSICIAN TELEPHONE EVALUATION 11-20 MIN: CPT | Mod: 95 | Performed by: PHYSICIAN ASSISTANT

## 2022-11-25 RX ORDER — SERTRALINE HYDROCHLORIDE 100 MG/1
150 TABLET, FILM COATED ORAL DAILY
Qty: 135 TABLET | Refills: 0 | Status: SHIPPED | OUTPATIENT
Start: 2022-11-25 | End: 2023-01-17

## 2022-11-25 RX ORDER — DONEPEZIL HYDROCHLORIDE 5 MG/1
5 TABLET, FILM COATED ORAL AT BEDTIME
Qty: 90 TABLET | Refills: 0 | Status: ON HOLD | OUTPATIENT
Start: 2022-11-25 | End: 2023-03-31
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
## 2022-11-25 NOTE — LETTER
My Depression Action Plan  Name: Sina Gomez   Date of Birth 1940  Date: 11/25/2022    My doctor: Brionna White   My clinic: [facility]  [id number] [place] [id number] [phone]          GREEN    ZONE   Good Control    What it looks like:     Things are going generally well. You have normal ups and downs. You may even feel depressed from time to time, but bad moods usually last less than a day.   What you need to do:  1. Continue to care for yourself (see self care plan)  2. Check your depression survival kit and update it as needed  3. Follow your physician s recommendations including any medication.  4. Do not stop taking medication unless you consult with your physician first.           YELLOW         ZONE Getting Worse    What it looks like:     Depression is starting to interfere with your life.     It may be hard to get out of bed; you may be starting to isolate yourself from others.    Symptoms of depression are starting to last most all day and this has happened for several days.     You may have suicidal thoughts but they are not constant.   What you need to do:     1. Call your care team. Your response to treatment will improve if you keep your care team informed of your progress. Yellow periods are signs an adjustment may need to be made.     2. Continue your self-care.  Just get dressed and ready for the day.  Don't give yourself time to talk yourself out of it.    3. Talk to someone in your support network.    4. Open up your Depression Self-Care Plan/Wellness Kit.           RED    ZONE Medical Alert - Get Help    What it looks like:     Depression is seriously interfering with your life.     You may experience these or other symptoms: You can t get out of bed most days, can t work or engage in other necessary activities, you have trouble taking care of basic hygiene, or basic responsibilities, thoughts of suicide or death that will not go  away, self-injurious behavior.     What you need to do:  1. Call your care team and request a same-day appointment. If they are not available (weekends or after hours) call your local crisis line, emergency room or 911.          Depression Self-Care Plan / Wellness Kit    Many people find that medication and therapy are helpful treatments for managing depression. In addition, making small changes to your everyday life can help to boost your mood and improve your wellbeing. Below are some tips for you to consider. Be sure to talk with your medical provider and/or behavioral health consultant if your symptoms are worsening or not improving.     Sleep   Sleep hygiene  means all of the habits that support good, restful sleep. It includes maintaining a consistent bedtime and wake time, using your bedroom only for sleeping or sex, and keeping the bedroom dark and free of distractions like a computer, smartphone, or television.     Develop a Healthy Routine  Maintain good hygiene. Get out of bed in the morning, make your bed, brush your teeth, take a shower, and get dressed. Don t spend too much time viewing media that makes you feel stressed. Find time to relax each day.    Exercise  Get some form of exercise every day. This will help reduce pain and release endorphins, the  feel good  chemicals in your brain. It can be as simple as just going for a walk or doing some gardening, anything that will get you moving.      Diet  Strive to eat healthy foods, including fruits and vegetables. Drink plenty of water. Avoid excessive sugar, caffeine, alcohol, and other mood-altering substances.     Stay Connected with Others  Stay in touch with friends and family members.    Manage Your Mood  Try deep breathing, massage therapy, biofeedback, or meditation. Take part in fun activities when you can. Try to find something to smile about each day.     Psychotherapy  Be open to working with a therapist if your provider recommends it.      Medication  Be sure to take your medication as prescribed. Most anti-depressants need to be taken every day. It usually takes several weeks for medications to work. Not all medicines work for all people. It is important to follow-up with your provider to make sure you have a treatment plan that is working for you. Do not stop your medication abruptly without first discussing it with your provider.    Crisis Resources   These hotlines are for both adults and children. They and are open 24 hours a day, 7 days a week unless noted otherwise.      National Suicide Prevention Lifeline   988 or 7-803-082-XLSP (9342)      Crisis Text Line    www.crisistextline.org  Text HOME to 173518 from anywhere in the [place], anytime, about any type of crisis. A live, trained crisis counselor will receive the text and respond quickly.      [facility] Lifeline for LGBTQ Youth  A national crisis intervention and suicide lifeline for LGBTQ youth under 25. Provides a safe place to talk without judgement. Call 1-763.115.1153; text START to 575343 or visit www.thetrevorproject.org to talk to a trained counselor.      For [facility] crisis numbers, visit the [facility] website at:  https://[place].gov/dhs/people-we-serve/adults/health-care/mental-health/resources/crisis-contacts.jsp

## 2022-11-25 NOTE — PATIENT INSTRUCTIONS
For anxiety: try increasing sertraline to 150 mg (1.5 tablets) daily    For memory: start low dose donepezil 5 mg daily  Discuss with neurologist at upcoming appointment   Monitor for diarrhea/side effects    For pain:  Use diclofenac gel two times daily   Take xtampza 9 mg at noon instead of 2 pm  Try taking oxycodone 2.5 mg at 2 pm instead of 4 pm   (As his pain is worsening early afternoon we will try to treat it sooner rather than waiting)

## 2022-12-02 DIAGNOSIS — F11.90 CHRONIC, CONTINUOUS USE OF OPIOIDS: ICD-10-CM

## 2022-12-02 RX ORDER — OXYCODONE 9 MG/1
9 CAPSULE, EXTENDED RELEASE ORAL
Qty: 30 CAPSULE | Refills: 0 | Status: SHIPPED | OUTPATIENT
Start: 2022-12-02 | End: 2023-01-15

## 2022-12-02 RX ORDER — OXYCODONE 13.5 MG/1
13.5 CAPSULE, EXTENDED RELEASE ORAL 2 TIMES DAILY PRN
Qty: 60 CAPSULE | Refills: 0 | Status: SHIPPED | OUTPATIENT
Start: 2022-12-02 | End: 2022-12-22

## 2022-12-02 NOTE — TELEPHONE ENCOUNTER
Pending Prescriptions:                       Disp   Refills    oxyCODONE (XTAMPZA ER) 9 MG 12 hr tablet  30 cap*0          oxyCODONE (XTAMPZA ER) 13.5 MG 12 hr tabl*60 cap*0          Patient is out of medication, would like both refilled. Chasidy Gonzalez on 12/2/2022 at 8:16 AM

## 2022-12-07 ENCOUNTER — TELEPHONE (OUTPATIENT)
Dept: FAMILY MEDICINE | Facility: CLINIC | Age: 82
End: 2022-12-07

## 2022-12-07 DIAGNOSIS — F11.90 CHRONIC, CONTINUOUS USE OF OPIOIDS: Primary | ICD-10-CM

## 2022-12-07 NOTE — TELEPHONE ENCOUNTER
Received a call from son Av, he is stating that the [facility] where patient lives has reached out to him to discuss that patient's current pain medication regimen is not working to manage his pain. Av says that the staff is supplementing with tylenol one hour after the Oxycodone due to patient reporting more pain, he provides the number [phone] to call [facility] nursing care team.    Called [facility] number and the number rang and rang and did not go to voicemail.      Called letty Blanchard back as did not get Av's number and is not listed in the chart. Santo says he will reach out to [facility] to get more information but states that the staff have been asking for provider to increase dosage of the Oxycodone as they are reporting patient has a lot of pain one hour after receiving the Oxycodone and have been supplementing with tylenol.  Santo will call back and give an update from [facility].    Will send this to the care team, awaiting son's return call.      JOSELYN Guerrier

## 2022-12-09 NOTE — TELEPHONE ENCOUNTER
He will need to be referred to pain management. He had been seeing a pain management provider before he started seeing me. When this started, he had been stable. I am not familiar with the medications he is on and I will refer him to pain management for further treatment. Please let the family know that I will put in this referral. . If that does not work well, then a referral to palliative care for pain management would be the next choice.   Brionna White M.D.

## 2022-12-09 NOTE — TELEPHONE ENCOUNTER
Call placed to alejandra Blanchard identified him.  Relayed message per Dr White and scheduling number given for patient management.    Call placed to [facility],spoke with Sophia ALVES and relayed Dr gonsalves message.  She requested a copy of the referral to be faxed to [phone].  Fax sent.  Mariaa Saucedo RN

## 2022-12-15 ENCOUNTER — OFFICE VISIT (OUTPATIENT)
Dept: NEUROLOGY | Facility: CLINIC | Age: 82
End: 2022-12-15
Payer: MEDICARE

## 2022-12-15 VITALS
DIASTOLIC BLOOD PRESSURE: 56 MMHG | SYSTOLIC BLOOD PRESSURE: 124 MMHG | BODY MASS INDEX: 30.13 KG/M2 | WEIGHT: 210 LBS | HEART RATE: 70 BPM

## 2022-12-15 DIAGNOSIS — R73.9 HYPERGLYCEMIA: ICD-10-CM

## 2022-12-15 DIAGNOSIS — G30.9 MAJOR NEUROCOGNITIVE DISORDER DUE TO ALZHEIMER'S DISEASE (H): ICD-10-CM

## 2022-12-15 DIAGNOSIS — I63.9 CEREBROVASCULAR ACCIDENT (CVA), UNSPECIFIED MECHANISM (H): ICD-10-CM

## 2022-12-15 DIAGNOSIS — F02.80 MAJOR NEUROCOGNITIVE DISORDER DUE TO ALZHEIMER'S DISEASE (H): ICD-10-CM

## 2022-12-15 DIAGNOSIS — R41.89 SUBJECTIVE MEMORY COMPLAINTS: Primary | ICD-10-CM

## 2022-12-15 DIAGNOSIS — Z87.820 HX OF TRAUMATIC BRAIN INJURY: ICD-10-CM

## 2022-12-15 PROCEDURE — 99215 OFFICE O/P EST HI 40 MIN: CPT | Performed by: PSYCHIATRY & NEUROLOGY

## 2022-12-15 RX ORDER — DONEPEZIL HYDROCHLORIDE 10 MG/1
10 TABLET, FILM COATED ORAL AT BEDTIME
Qty: 90 TABLET | Refills: 1 | Status: SHIPPED | OUTPATIENT
Start: 2022-12-15 | End: 2023-05-26
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
## 2022-12-15 NOTE — NURSING NOTE
"Sina Gomez is a 82 year old male who presents for:  Chief Complaint   Patient presents with     Stroke     Follow-up   ADAM Leg pain; mainly the L side         Initial Vitals:  /56   Pulse 70   Wt 95.3 kg (210 lb)   BMI 30.13 kg/m   Estimated body mass index is 30.13 kg/m  as calculated from the following:    Height as of 7/27/22: 1.778 m (5' 10\").    Weight as of this encounter: 95.3 kg (210 lb).. Body surface area is 2.17 meters squared. BP completed using cuff size: wrist cuff    Rock Roldan  "
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
## 2022-12-19 DIAGNOSIS — I25.718 CORONARY ARTERY DISEASE OF AUTOLOGOUS VEIN BYPASS GRAFT WITH STABLE ANGINA PECTORIS (H): ICD-10-CM

## 2022-12-20 DIAGNOSIS — F11.90 CHRONIC, CONTINUOUS USE OF OPIOIDS: ICD-10-CM

## 2022-12-20 RX ORDER — FUROSEMIDE 20 MG
TABLET ORAL
Qty: 30 TABLET | Refills: 1 | Status: SHIPPED | OUTPATIENT
Start: 2022-12-20 | End: 2023-02-22

## 2022-12-20 NOTE — TELEPHONE ENCOUNTER
"Last Written Prescription Date:  30  Last Fill Quantity: 30, # refills: 1  Last office visit provider: 7/14/22         Requested Prescriptions   Pending Prescriptions Disp Refills     furosemide (LASIX) 20 MG tablet [Pharmacy Med Name: FUROSEMIDE 20MG TABLET] 30 tablet 1     Sig: TAKE 1 TABLET BY MOUTH DAILY       Diuretics (Including Combos) Protocol Passed - 12/19/2022  1:53 PM        Passed - Blood pressure under 140/90 in past 12 months     BP Readings from Last 3 Encounters:   12/15/22 124/56   07/27/22 133/64   07/20/22 (!) 145/64                 Passed - Recent (12 mo) or future (30 days) visit within the authorizing provider's specialty     Patient has had an office visit with the authorizing provider or a provider within the authorizing providers department within the previous 12 mos or has a future within next 30 days. See \"Patient Info\" tab in inbasket, or \"Choose Columns\" in Meds & Orders section of the refill encounter.              Passed - Medication is active on med list        Passed - Patient is age 18 or older        Passed - Normal serum creatinine on file in past 12 months     Recent Labs   Lab Test 07/14/22  1157   CR 0.83              Passed - Normal serum potassium on file in past 12 months     Recent Labs   Lab Test 07/14/22  1157   POTASSIUM 4.1                    Passed - Normal serum sodium on file in past 12 months     Recent Labs   Lab Test 07/14/22  1157                          Monica Robles RN 12/20/22 12:44 PM  "

## 2022-12-22 RX ORDER — OXYCODONE 13.5 MG/1
CAPSULE, EXTENDED RELEASE ORAL
Qty: 60 CAPSULE | Refills: 0 | Status: SHIPPED | OUTPATIENT
Start: 2022-12-22 | End: 2023-01-11

## 2022-12-29 DIAGNOSIS — F11.90 CHRONIC, CONTINUOUS USE OF OPIOIDS: ICD-10-CM

## 2022-12-30 RX ORDER — OXYCODONE HYDROCHLORIDE 5 MG/1
TABLET ORAL
Qty: 45 TABLET | Refills: 0 | Status: SHIPPED | OUTPATIENT
Start: 2022-12-30 | End: 2023-01-11

## 2023-01-07 ENCOUNTER — HEALTH MAINTENANCE LETTER (OUTPATIENT)
Age: 83
End: 2023-01-07

## 2023-01-07 DIAGNOSIS — F11.90 CHRONIC, CONTINUOUS USE OF OPIOIDS: ICD-10-CM

## 2023-01-08 DIAGNOSIS — F11.90 CHRONIC, CONTINUOUS USE OF OPIOIDS: ICD-10-CM

## 2023-01-11 RX ORDER — OXYCODONE HYDROCHLORIDE 5 MG/1
TABLET ORAL
Qty: 45 TABLET | Refills: 0 | Status: SHIPPED | OUTPATIENT
Start: 2023-01-11 | End: 2023-02-20

## 2023-01-11 RX ORDER — OXYCODONE 13.5 MG/1
CAPSULE, EXTENDED RELEASE ORAL
Qty: 60 CAPSULE | Refills: 0 | Status: SHIPPED | OUTPATIENT
Start: 2023-01-11 | End: 2023-03-19

## 2023-01-13 DIAGNOSIS — F11.90 CHRONIC, CONTINUOUS USE OF OPIOIDS: ICD-10-CM

## 2023-01-14 DIAGNOSIS — I20.89 STABLE ANGINA (H): Primary | ICD-10-CM

## 2023-01-15 RX ORDER — OXYCODONE 9 MG/1
CAPSULE, EXTENDED RELEASE ORAL
Qty: 60 CAPSULE | Refills: 0 | Status: SHIPPED | OUTPATIENT
Start: 2023-01-15 | End: 2023-03-19

## 2023-01-15 RX ORDER — NITROGLYCERIN 0.4 MG/1
TABLET SUBLINGUAL
Qty: 25 TABLET | Refills: 11 | Status: SHIPPED | OUTPATIENT
Start: 2023-01-15 | End: 2024-01-23

## 2023-01-16 DIAGNOSIS — F33.41 RECURRENT MAJOR DEPRESSIVE DISORDER, IN PARTIAL REMISSION (H): ICD-10-CM

## 2023-01-16 NOTE — TELEPHONE ENCOUNTER
Routing refill request to provider for review/approval because:  PHQ9: 6 on 7/2022     Kike Flynn RN

## 2023-01-17 ENCOUNTER — TRANSFERRED RECORDS (OUTPATIENT)
Dept: HEALTH INFORMATION MANAGEMENT | Facility: CLINIC | Age: 83
End: 2023-01-17

## 2023-01-17 RX ORDER — BUSPIRONE HYDROCHLORIDE 15 MG/1
TABLET ORAL
Qty: 90 TABLET | Refills: 3 | Status: SHIPPED | OUTPATIENT
Start: 2023-01-17 | End: 2023-05-26

## 2023-01-17 RX ORDER — SERTRALINE HYDROCHLORIDE 100 MG/1
TABLET, FILM COATED ORAL
Qty: 135 TABLET | Refills: 0 | Status: SHIPPED | OUTPATIENT
Start: 2023-01-17 | End: 2023-05-26

## 2023-02-14 ASSESSMENT — ANXIETY QUESTIONNAIRES
3. WORRYING TOO MUCH ABOUT DIFFERENT THINGS: NOT AT ALL
GAD7 TOTAL SCORE: 0
5. BEING SO RESTLESS THAT IT IS HARD TO SIT STILL: NOT AT ALL
1. FEELING NERVOUS, ANXIOUS, OR ON EDGE: NOT AT ALL
6. BECOMING EASILY ANNOYED OR IRRITABLE: NOT AT ALL
2. NOT BEING ABLE TO STOP OR CONTROL WORRYING: NOT AT ALL
7. FEELING AFRAID AS IF SOMETHING AWFUL MIGHT HAPPEN: NOT AT ALL
4. TROUBLE RELAXING: NOT AT ALL
8. IF YOU CHECKED OFF ANY PROBLEMS, HOW DIFFICULT HAVE THESE MADE IT FOR YOU TO DO YOUR WORK, TAKE CARE OF THINGS AT HOME, OR GET ALONG WITH OTHER PEOPLE?: NOT DIFFICULT AT ALL
GAD7 TOTAL SCORE: 0
7. FEELING AFRAID AS IF SOMETHING AWFUL MIGHT HAPPEN: NOT AT ALL
IF YOU CHECKED OFF ANY PROBLEMS ON THIS QUESTIONNAIRE, HOW DIFFICULT HAVE THESE PROBLEMS MADE IT FOR YOU TO DO YOUR WORK, TAKE CARE OF THINGS AT HOME, OR GET ALONG WITH OTHER PEOPLE: NOT DIFFICULT AT ALL

## 2023-02-14 ASSESSMENT — PAIN SCALES - PAIN ENJOYMENT GENERAL ACTIVITY SCALE (PEG)
INTERFERED_GENERAL_ACTIVITY: 5
AVG_PAIN_PASTWEEK: 7
INTERFERED_ENJOYMENT_LIFE: 5
PEG_TOTALSCORE: 5.67
INTERFERED_GENERAL_ACTIVITY: 5
PEG_TOTALSCORE: 5.67
AVG_PAIN_PASTWEEK: 7
INTERFERED_ENJOYMENT_LIFE: 5

## 2023-02-16 ENCOUNTER — OFFICE VISIT (OUTPATIENT)
Dept: PALLIATIVE MEDICINE | Facility: OTHER | Age: 83
End: 2023-02-16
Payer: MEDICARE

## 2023-02-16 VITALS
SYSTOLIC BLOOD PRESSURE: 158 MMHG | HEART RATE: 61 BPM | BODY MASS INDEX: 28.84 KG/M2 | OXYGEN SATURATION: 93 % | WEIGHT: 201 LBS | DIASTOLIC BLOOD PRESSURE: 68 MMHG

## 2023-02-16 DIAGNOSIS — Z79.891 USE OF OPIATES FOR THERAPEUTIC PURPOSES: ICD-10-CM

## 2023-02-16 DIAGNOSIS — M54.6 CHRONIC BILATERAL THORACIC BACK PAIN: ICD-10-CM

## 2023-02-16 DIAGNOSIS — G89.29 CHRONIC BILATERAL THORACIC BACK PAIN: ICD-10-CM

## 2023-02-16 DIAGNOSIS — G89.29 CHRONIC BILATERAL LOW BACK PAIN, UNSPECIFIED WHETHER SCIATICA PRESENT: ICD-10-CM

## 2023-02-16 DIAGNOSIS — M54.12 CHRONIC RADICULAR CERVICAL PAIN: ICD-10-CM

## 2023-02-16 DIAGNOSIS — G89.29 CHRONIC INTRACTABLE PAIN: Primary | ICD-10-CM

## 2023-02-16 DIAGNOSIS — M19.012 LOCALIZED PRIMARY OSTEOARTHRITIS OF LEFT SHOULDER REGION: ICD-10-CM

## 2023-02-16 DIAGNOSIS — M15.3 POST-TRAUMATIC OSTEOARTHRITIS OF MULTIPLE JOINTS: ICD-10-CM

## 2023-02-16 DIAGNOSIS — M54.50 CHRONIC BILATERAL LOW BACK PAIN, UNSPECIFIED WHETHER SCIATICA PRESENT: ICD-10-CM

## 2023-02-16 DIAGNOSIS — G89.29 CHRONIC RADICULAR CERVICAL PAIN: ICD-10-CM

## 2023-02-16 LAB
CREAT UR-MCNC: 253 MG/DL
ETHANOL UR QL SCN: NORMAL

## 2023-02-16 PROCEDURE — 80347 BENZODIAZEPINES 13 OR MORE: CPT

## 2023-02-16 PROCEDURE — 80370 SKEL MUSC RELAXANT 3 OR MORE: CPT

## 2023-02-16 PROCEDURE — 80364 OPIOID &OPIATE ANALOG 5/MORE: CPT

## 2023-02-16 PROCEDURE — G0480 DRUG TEST DEF 1-7 CLASSES: HCPCS

## 2023-02-16 PROCEDURE — 80341 ANTIEPILEPTICS NOS 7/MORE: CPT

## 2023-02-16 PROCEDURE — 80371 STIMULANTS SYNTHETIC: CPT

## 2023-02-16 PROCEDURE — 80368 SEDATIVE HYPNOTICS: CPT

## 2023-02-16 PROCEDURE — 80338 ANTIDEPRESSANT NOT SPECIFIED: CPT

## 2023-02-16 PROCEDURE — 80348 DRUG SCREENING BUPRENORPHINE: CPT

## 2023-02-16 PROCEDURE — 80331 ANALGESICS NON-OPIOID 6/MORE: CPT

## 2023-02-16 PROCEDURE — 99204 OFFICE O/P NEW MOD 45 MIN: CPT | Performed by: NURSE PRACTITIONER

## 2023-02-16 PROCEDURE — 80367 DRUG SCREENING PROPOXYPHENE: CPT

## 2023-02-16 PROCEDURE — 80357 KETAMINE AND NORKETAMINE: CPT

## 2023-02-16 PROCEDURE — 80372 DRUG SCREENING TAPENTADOL: CPT

## 2023-02-16 PROCEDURE — G0463 HOSPITAL OUTPT CLINIC VISIT: HCPCS | Performed by: NURSE PRACTITIONER

## 2023-02-16 PROCEDURE — 80377 DRUG/SUBSTANCE NOS 7/MORE: CPT

## 2023-02-16 PROCEDURE — 84999 UNLISTED CHEMISTRY PROCEDURE: CPT

## 2023-02-16 PROCEDURE — 80326 AMPHETAMINES 5 OR MORE: CPT

## 2023-02-16 PROCEDURE — 80337 TRICYCLIC & CYCLICALS 6/MORE: CPT

## 2023-02-16 PROCEDURE — 80355 GABAPENTIN NON-BLOOD: CPT

## 2023-02-16 PROCEDURE — 83992 ASSAY FOR PHENCYCLIDINE: CPT

## 2023-02-16 PROCEDURE — 80307 DRUG TEST PRSMV CHEM ANLYZR: CPT | Performed by: NURSE PRACTITIONER

## 2023-02-16 PROCEDURE — 80354 DRUG SCREENING FENTANYL: CPT

## 2023-02-16 PROCEDURE — 80334 ANTIDEPRESSANTS CLASS 6/MORE: CPT

## 2023-02-16 PROCEDURE — 80353 DRUG SCREENING COCAINE: CPT

## 2023-02-16 PROCEDURE — 80360 METHYLPHENIDATE: CPT

## 2023-02-16 PROCEDURE — 80359 METHYLENEDIOXYAMPHETAMINES: CPT

## 2023-02-16 PROCEDURE — 80373 DRUG SCREENING TRAMADOL: CPT

## 2023-02-16 PROCEDURE — 80320 DRUG SCREEN QUANTALCOHOLS: CPT | Performed by: NURSE PRACTITIONER

## 2023-02-16 PROCEDURE — 80344 ANTIPSYCHOTICS NOS 7/MORE: CPT

## 2023-02-16 PROCEDURE — 80358 DRUG SCREENING METHADONE: CPT

## 2023-02-16 PROCEDURE — 84999 UNLISTED CHEMISTRY PROCEDURE: CPT | Performed by: NURSE PRACTITIONER

## 2023-02-16 PROCEDURE — 80361 OPIATES 1 OR MORE: CPT

## 2023-02-16 PROCEDURE — 80366 DRUG SCREENING PREGABALIN: CPT

## 2023-02-16 PROCEDURE — G0463 HOSPITAL OUTPT CLINIC VISIT: HCPCS

## 2023-02-16 PROCEDURE — 80365 DRUG SCREENING OXYCODONE: CPT

## 2023-02-16 ASSESSMENT — PAIN SCALES - GENERAL: PAINLEVEL: MILD PAIN (2)

## 2023-02-16 NOTE — PATIENT INSTRUCTIONS
After Visit Instructions:     Thank you for coming to [facility] for your care. It is my goal to partner with you to help you reach your optimal state of health.   Continue daily self-care, identifying contributing factors, and monitoring variations in pain level. Continue to integrate self-care into your life.      30 minutes Video or Clinic follow-up with ALPESH uTcker NP-C in 6 weeks  or sooner as needed.   Labs: Urine drug screen   Procedures recommended: We may consider doing an SI joint injection, the area between the sacrum and the illiac crest of the hip on the right side  Medication Management :   Fentanyl patch 12.5 mcg patch. Change patch every 48 hrs.   Increase Oxycodone 5 mg to three (3) full tabs per day.       ALPESH Alamo NP-C  [facility]  [facility] - Monday and Friday  [place] [facility] - Tuesday [place] - Thursday    Be sure to request ALL medication refills 5 days prior to the due date unless you will see your medical provider in an appointment before the due date.  This is YOUR responsibility. Do not expect same day refills. If you do not plan ahead you may run out of medications.   NO early refills are allowed. It is your responsibility to manage your medications responsibility and keep them safely stored. Lost or destroyed medications WILL NOT be replaced    Scheduling/Clinic telephone Number for ALL locations:  [phone]    After Hours On-Call Service:  [phone]    Call with any questions about your care and for scheduling assistance.   Calls are returned Monday through Friday between 8 AM and 4:00 PM. We usually get back to you within 2 business days depending on the issue/request.    If we are prescribing your medications:  For opioid medication refills, call the clinic or send a ToutApp message 7 days in advance.  Please include:  Your name and date of birth.   Name of requested medication  Name of the pharmacy.  For  non-opioid medications, call your pharmacy directly to request a refill. Please allow 3-4 days to be processed.   Per [place] State Law:  All controlled substance prescriptions must be filled within 30 days of being written.    For those controlled substances allowing refills, pickup must occur within 30 days of last fill.      We believe regular attendance is key to your success in our program!    Any time you are unable to keep your appointment we ask that you call us at least 24 hours in advance to cancel.This will allow us to offer the appointment time to another patient.   Multiple missed appointments may lead to dismissal from the clinic.

## 2023-02-16 NOTE — PROGRESS NOTES
Patient presents to the clinic today for a follow up with ALPESH Garrido CNP regarding Pain Management.        UDS/CSA-     Medications-     QUESTIONS:    Jazmine Best  [facility] Visit Facilitator
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
## 2023-02-17 DIAGNOSIS — I25.718 CORONARY ARTERY DISEASE OF AUTOLOGOUS VEIN BYPASS GRAFT WITH STABLE ANGINA PECTORIS (H): ICD-10-CM

## 2023-02-17 LAB
Lab: NORMAL
PERFORMING LABORATORY: NORMAL
SPECIMEN STATUS: NORMAL
TEST NAME: NORMAL

## 2023-02-20 RX ORDER — OXYCODONE HYDROCHLORIDE 5 MG/1
5 TABLET ORAL 3 TIMES DAILY PRN
Qty: 90 TABLET | Refills: 0 | Status: ON HOLD | OUTPATIENT
Start: 2023-02-20 | End: 2023-03-31

## 2023-02-20 RX ORDER — FENTANYL 12.5 UG/1
1 PATCH TRANSDERMAL
Qty: 15 PATCH | Refills: 0 | Status: SHIPPED | OUTPATIENT
Start: 2023-02-20 | End: 2023-03-31

## 2023-02-22 RX ORDER — FUROSEMIDE 20 MG
TABLET ORAL
Qty: 30 TABLET | Refills: 1 | Status: SHIPPED | OUTPATIENT
Start: 2023-02-22 | End: 2023-05-26

## 2023-02-22 NOTE — TELEPHONE ENCOUNTER
Routing refill request to provider for review/approval because:  Last recorded blood pressure does not meet RN protocol parameters    Kike Flynn RN

## 2023-02-26 LAB — LABCORP INTERFACED MISCELLANEOUS TEST RESULT: NORMAL

## 2023-03-01 ENCOUNTER — TELEPHONE (OUTPATIENT)
Dept: FAMILY MEDICINE | Facility: CLINIC | Age: 83
End: 2023-03-01
Payer: MEDICARE

## 2023-03-01 NOTE — TELEPHONE ENCOUNTER
New Medication Request    Contacts       Type Contact Phone/Fax    03/01/2023 09:13 AM CST Phone (Incoming) Sina Gomez (Self) [phone] (H)          What medication are you requesting?: Oxycocone 2.5 mg with Tylenol in between Oxy doses     Reason for medication request: Better pain control    Have you taken this medication before?: Yes:     Controlled Substance Agreement on file:   CSA -- Patient Level:     [Media Unavailable] Controlled Substance Agreement - Opioid - Scan on 2/16/2023  3:24 PM         Patient offered an appointment? No    Preferred Pharmacy:     [id number] - [place], [place] - [address]  [address]  [address]  [facility] [address]  Phone: [phone] Fax: [phone]    Fax copy of order to [phone] also      Could we send this information to you in [email or web address] [facility] or would you prefer to receive a phone call?:   Patient would prefer a phone call   Okay to leave a detailed message?: Yes at Home number on file [phone] (home) or Other phone number:  Sophia [phone]

## 2023-03-03 DIAGNOSIS — G89.29 CHRONIC BILATERAL THORACIC BACK PAIN: ICD-10-CM

## 2023-03-03 DIAGNOSIS — F11.90 CHRONIC, CONTINUOUS USE OF OPIOIDS: Primary | ICD-10-CM

## 2023-03-03 DIAGNOSIS — M54.6 CHRONIC BILATERAL THORACIC BACK PAIN: ICD-10-CM

## 2023-03-03 RX ORDER — ACETAMINOPHEN 500 MG
500-1000 TABLET ORAL EVERY 8 HOURS PRN
Qty: 30 TABLET | Refills: 0 | Status: SHIPPED | OUTPATIENT
Start: 2023-03-03

## 2023-03-03 NOTE — TELEPHONE ENCOUNTER
Received call from Alexx from [facility]  Patient has chronic pain, already on narcotic pain medication  [facility] looking for prn order for Tylenol  Fax # [phone]  Arun Wade RN

## 2023-03-03 NOTE — TELEPHONE ENCOUNTER
This patient saw pain clinic on 2/16/23  - He is supposed to be taking the oxy 5mg three times/day   He got #30 on 2/26/23     I will send this request to the pain provider who is currently filling his meds.     Here is their plan:   M54.12,  G89.29) Chronic radicular cervical pain  Comment: Osvaldo is a pleasant man who presents in the company of his son for evaluation of chronic pain and review of chronic opiate use. Per his report and that of his family, , his chronic pain is not well managed with current medications. We will try changing the extended release medication to Fentanyl changed q48h and continue Oxycodone 5 mg TID PRN. We will follow up at 4-6 weeks. He does have bilateral SI joint pain R>L and we will discuss option of injections at the next visit. He wants to see how med chagne works first.   Plan: fentaNYL (DURAGESIC) 12 mcg/hr 72 hr patch,         oxyCODONE (ROXICODONE) 5 MG tablet      He is supposed to f/u in 4 weeks and has an appointment 4/6/23 at pain clinic.

## 2023-03-17 ENCOUNTER — TELEPHONE (OUTPATIENT)
Dept: PALLIATIVE MEDICINE | Facility: CLINIC | Age: 83
End: 2023-03-17

## 2023-03-19 ENCOUNTER — APPOINTMENT (OUTPATIENT)
Dept: GENERAL RADIOLOGY | Facility: CLINIC | Age: 83
DRG: 871 | End: 2023-03-19
Attending: NURSE PRACTITIONER
Payer: MEDICARE

## 2023-03-19 ENCOUNTER — APPOINTMENT (OUTPATIENT)
Dept: CT IMAGING | Facility: CLINIC | Age: 83
DRG: 871 | End: 2023-03-19
Attending: NURSE PRACTITIONER
Payer: MEDICARE

## 2023-03-19 ENCOUNTER — HOSPITAL ENCOUNTER (INPATIENT)
Facility: CLINIC | Age: 83
LOS: 12 days | Discharge: HOME HEALTH | DRG: 871 | End: 2023-03-31
Attending: NURSE PRACTITIONER | Admitting: INTERNAL MEDICINE
Payer: MEDICARE

## 2023-03-19 DIAGNOSIS — R11.2 NAUSEA VOMITING AND DIARRHEA: ICD-10-CM

## 2023-03-19 DIAGNOSIS — Z11.52 ENCOUNTER FOR SCREENING LABORATORY TESTING FOR SEVERE ACUTE RESPIRATORY SYNDROME CORONAVIRUS 2 (SARS-COV-2): ICD-10-CM

## 2023-03-19 DIAGNOSIS — G89.29 CHRONIC BILATERAL THORACIC BACK PAIN: ICD-10-CM

## 2023-03-19 DIAGNOSIS — F02.80 ALZHEIMER'S DISEASE (H): Chronic | ICD-10-CM

## 2023-03-19 DIAGNOSIS — M54.6 CHRONIC BILATERAL THORACIC BACK PAIN: ICD-10-CM

## 2023-03-19 DIAGNOSIS — R19.7 NAUSEA VOMITING AND DIARRHEA: ICD-10-CM

## 2023-03-19 DIAGNOSIS — R50.9 HYPERTHERMIA-INDUCED DEFECT: ICD-10-CM

## 2023-03-19 DIAGNOSIS — R10.84 ABDOMINAL PAIN, GENERALIZED: ICD-10-CM

## 2023-03-19 DIAGNOSIS — R19.7 DIARRHEA OF PRESUMED INFECTIOUS ORIGIN: ICD-10-CM

## 2023-03-19 DIAGNOSIS — K52.9 COLITIS: ICD-10-CM

## 2023-03-19 DIAGNOSIS — G30.9 ALZHEIMER'S DISEASE (H): Chronic | ICD-10-CM

## 2023-03-19 DIAGNOSIS — I10 BENIGN ESSENTIAL HYPERTENSION: Primary | Chronic | ICD-10-CM

## 2023-03-19 DIAGNOSIS — I95.9 HYPOTENSION, UNSPECIFIED HYPOTENSION TYPE: ICD-10-CM

## 2023-03-19 DIAGNOSIS — R11.2 NAUSEA AND VOMITING, UNSPECIFIED VOMITING TYPE: ICD-10-CM

## 2023-03-19 DIAGNOSIS — R50.9 ACUTE FEBRILE ILLNESS: ICD-10-CM

## 2023-03-19 LAB
ALBUMIN SERPL BCG-MCNC: 3.5 G/DL (ref 3.5–5.2)
ALBUMIN UR-MCNC: NEGATIVE MG/DL
ALP SERPL-CCNC: 110 U/L (ref 40–129)
ALT SERPL W P-5'-P-CCNC: 21 U/L (ref 10–50)
ANION GAP SERPL CALCULATED.3IONS-SCNC: 9 MMOL/L (ref 7–15)
APPEARANCE UR: CLEAR
AST SERPL W P-5'-P-CCNC: 32 U/L (ref 10–50)
BACTERIA #/AREA URNS HPF: ABNORMAL /HPF
BASOPHILS # BLD AUTO: 0 10E3/UL (ref 0–0.2)
BASOPHILS NFR BLD AUTO: 0 %
BILIRUB SERPL-MCNC: 0.6 MG/DL
BILIRUB UR QL STRIP: NEGATIVE
BUN SERPL-MCNC: 19.9 MG/DL (ref 8–23)
CALCIUM SERPL-MCNC: 9.5 MG/DL (ref 8.8–10.2)
CHLORIDE SERPL-SCNC: 102 MMOL/L (ref 98–107)
COLOR UR AUTO: ABNORMAL
CREAT SERPL-MCNC: 0.85 MG/DL (ref 0.67–1.17)
DEPRECATED HCO3 PLAS-SCNC: 27 MMOL/L (ref 22–29)
EOSINOPHIL # BLD AUTO: 0 10E3/UL (ref 0–0.7)
EOSINOPHIL NFR BLD AUTO: 0 %
ERYTHROCYTE [DISTWIDTH] IN BLOOD BY AUTOMATED COUNT: 15.7 % (ref 10–15)
FLUAV RNA SPEC QL NAA+PROBE: NEGATIVE
FLUBV RNA RESP QL NAA+PROBE: NEGATIVE
GFR SERPL CREATININE-BSD FRML MDRD: 86 ML/MIN/1.73M2
GLUCOSE SERPL-MCNC: 136 MG/DL (ref 70–99)
GLUCOSE UR STRIP-MCNC: NEGATIVE MG/DL
HCT VFR BLD AUTO: 39.5 % (ref 40–53)
HGB BLD-MCNC: 12.3 G/DL (ref 13.3–17.7)
HGB UR QL STRIP: NEGATIVE
HOLD SPECIMEN: NORMAL
IMM GRANULOCYTES # BLD: 0 10E3/UL
IMM GRANULOCYTES NFR BLD: 0 %
KETONES UR STRIP-MCNC: NEGATIVE MG/DL
LACTATE SERPL-SCNC: 1.4 MMOL/L (ref 0.7–2)
LACTATE SERPL-SCNC: 2.1 MMOL/L (ref 0.7–2)
LEUKOCYTE ESTERASE UR QL STRIP: NEGATIVE
LYMPHOCYTES # BLD AUTO: 0.7 10E3/UL (ref 0.8–5.3)
LYMPHOCYTES NFR BLD AUTO: 11 %
MAGNESIUM SERPL-MCNC: 1.6 MG/DL (ref 1.7–2.3)
MCH RBC QN AUTO: 30.4 PG (ref 26.5–33)
MCHC RBC AUTO-ENTMCNC: 31.1 G/DL (ref 31.5–36.5)
MCV RBC AUTO: 98 FL (ref 78–100)
MONOCYTES # BLD AUTO: 0 10E3/UL (ref 0–1.3)
MONOCYTES NFR BLD AUTO: 0 %
MUCOUS THREADS #/AREA URNS LPF: PRESENT /LPF
NEUTROPHILS # BLD AUTO: 5.4 10E3/UL (ref 1.6–8.3)
NEUTROPHILS NFR BLD AUTO: 89 %
NITRATE UR QL: NEGATIVE
NRBC # BLD AUTO: 0 10E3/UL
NRBC BLD AUTO-RTO: 0 /100
PH UR STRIP: 5 [PH] (ref 5–7)
PLATELET # BLD AUTO: 182 10E3/UL (ref 150–450)
POTASSIUM SERPL-SCNC: 3.9 MMOL/L (ref 3.4–5.3)
PROT SERPL-MCNC: 6.2 G/DL (ref 6.4–8.3)
RBC # BLD AUTO: 4.05 10E6/UL (ref 4.4–5.9)
RBC URINE: 2 /HPF
RSV RNA SPEC NAA+PROBE: NEGATIVE
SARS-COV-2 RNA RESP QL NAA+PROBE: NEGATIVE
SODIUM SERPL-SCNC: 138 MMOL/L (ref 136–145)
SP GR UR STRIP: 1 (ref 1–1.03)
SQUAMOUS EPITHELIAL: 1 /HPF
T4 FREE SERPL-MCNC: 0.96 NG/DL (ref 0.9–1.7)
TROPONIN T SERPL HS-MCNC: 80 NG/L
TSH SERPL DL<=0.005 MIU/L-ACNC: 5.52 UIU/ML (ref 0.3–4.2)
UROBILINOGEN UR STRIP-MCNC: NORMAL MG/DL
WBC # BLD AUTO: 6.1 10E3/UL (ref 4–11)
WBC URINE: 3 /HPF

## 2023-03-19 PROCEDURE — 36415 COLL VENOUS BLD VENIPUNCTURE: CPT | Performed by: NURSE PRACTITIONER

## 2023-03-19 PROCEDURE — 87086 URINE CULTURE/COLONY COUNT: CPT | Performed by: NURSE PRACTITIONER

## 2023-03-19 PROCEDURE — 84484 ASSAY OF TROPONIN QUANT: CPT | Performed by: NURSE PRACTITIONER

## 2023-03-19 PROCEDURE — 36556 INSERT NON-TUNNEL CV CATH: CPT | Performed by: EMERGENCY MEDICINE

## 2023-03-19 PROCEDURE — 258N000003 HC RX IP 258 OP 636: Performed by: NURSE PRACTITIONER

## 2023-03-19 PROCEDURE — 99285 EMERGENCY DEPT VISIT HI MDM: CPT | Mod: 25 | Performed by: NURSE PRACTITIONER

## 2023-03-19 PROCEDURE — 250N000011 HC RX IP 250 OP 636: Performed by: NURSE PRACTITIONER

## 2023-03-19 PROCEDURE — G0378 HOSPITAL OBSERVATION PER HR: HCPCS

## 2023-03-19 PROCEDURE — 87637 SARSCOV2&INF A&B&RSV AMP PRB: CPT | Performed by: NURSE PRACTITIONER

## 2023-03-19 PROCEDURE — 250N000011 HC RX IP 250 OP 636: Performed by: EMERGENCY MEDICINE

## 2023-03-19 PROCEDURE — 250N000009 HC RX 250: Performed by: NURSE PRACTITIONER

## 2023-03-19 PROCEDURE — 87077 CULTURE AEROBIC IDENTIFY: CPT | Performed by: NURSE PRACTITIONER

## 2023-03-19 PROCEDURE — 83735 ASSAY OF MAGNESIUM: CPT | Performed by: NURSE PRACTITIONER

## 2023-03-19 PROCEDURE — 99291 CRITICAL CARE FIRST HOUR: CPT | Mod: 25,CS | Performed by: NURSE PRACTITIONER

## 2023-03-19 PROCEDURE — 85025 COMPLETE CBC W/AUTO DIFF WBC: CPT | Performed by: NURSE PRACTITIONER

## 2023-03-19 PROCEDURE — 250N000013 HC RX MED GY IP 250 OP 250 PS 637: Performed by: NURSE PRACTITIONER

## 2023-03-19 PROCEDURE — 51702 INSERT TEMP BLADDER CATH: CPT | Performed by: NURSE PRACTITIONER

## 2023-03-19 PROCEDURE — 74177 CT ABD & PELVIS W/CONTRAST: CPT | Mod: MA

## 2023-03-19 PROCEDURE — 96361 HYDRATE IV INFUSION ADD-ON: CPT | Performed by: NURSE PRACTITIONER

## 2023-03-19 PROCEDURE — 81001 URINALYSIS AUTO W/SCOPE: CPT | Performed by: NURSE PRACTITIONER

## 2023-03-19 PROCEDURE — 80053 COMPREHEN METABOLIC PANEL: CPT | Performed by: NURSE PRACTITIONER

## 2023-03-19 PROCEDURE — 250N000013 HC RX MED GY IP 250 OP 250 PS 637: Performed by: EMERGENCY MEDICINE

## 2023-03-19 PROCEDURE — 999N000065 XR CHEST PORT 1 VIEW

## 2023-03-19 PROCEDURE — 96374 THER/PROPH/DIAG INJ IV PUSH: CPT | Mod: 59 | Performed by: NURSE PRACTITIONER

## 2023-03-19 PROCEDURE — 99291 CRITICAL CARE FIRST HOUR: CPT | Mod: 25 | Performed by: EMERGENCY MEDICINE

## 2023-03-19 PROCEDURE — 87149 DNA/RNA DIRECT PROBE: CPT | Performed by: NURSE PRACTITIONER

## 2023-03-19 PROCEDURE — 96375 TX/PRO/DX INJ NEW DRUG ADDON: CPT | Performed by: NURSE PRACTITIONER

## 2023-03-19 PROCEDURE — 83605 ASSAY OF LACTIC ACID: CPT | Performed by: NURSE PRACTITIONER

## 2023-03-19 PROCEDURE — 3E043XZ INTRODUCTION OF VASOPRESSOR INTO CENTRAL VEIN, PERCUTANEOUS APPROACH: ICD-10-PCS | Performed by: EMERGENCY MEDICINE

## 2023-03-19 PROCEDURE — C9803 HOPD COVID-19 SPEC COLLECT: HCPCS

## 2023-03-19 PROCEDURE — 84439 ASSAY OF FREE THYROXINE: CPT | Performed by: NURSE PRACTITIONER

## 2023-03-19 PROCEDURE — 84443 ASSAY THYROID STIM HORMONE: CPT | Performed by: NURSE PRACTITIONER

## 2023-03-19 PROCEDURE — 200N000001 HC R&B ICU

## 2023-03-19 PROCEDURE — 99223 1ST HOSP IP/OBS HIGH 75: CPT | Mod: AI

## 2023-03-19 RX ORDER — SODIUM CHLORIDE, SODIUM LACTATE, POTASSIUM CHLORIDE, CALCIUM CHLORIDE 600; 310; 30; 20 MG/100ML; MG/100ML; MG/100ML; MG/100ML
150 INJECTION, SOLUTION INTRAVENOUS CONTINUOUS
Status: DISCONTINUED | OUTPATIENT
Start: 2023-03-19 | End: 2023-03-20

## 2023-03-19 RX ORDER — ONDANSETRON 2 MG/ML
4 INJECTION INTRAMUSCULAR; INTRAVENOUS ONCE
Status: COMPLETED | OUTPATIENT
Start: 2023-03-19 | End: 2023-03-19

## 2023-03-19 RX ORDER — ACETAMINOPHEN 500 MG
1000 TABLET ORAL ONCE
Status: COMPLETED | OUTPATIENT
Start: 2023-03-19 | End: 2023-03-19

## 2023-03-19 RX ORDER — OXYCODONE HYDROCHLORIDE 5 MG/1
5 TABLET ORAL ONCE
Status: DISCONTINUED | OUTPATIENT
Start: 2023-03-19 | End: 2023-03-19

## 2023-03-19 RX ORDER — LANOLIN ALCOHOL/MO/W.PET/CERES
3 CREAM (GRAM) TOPICAL AT BEDTIME
COMMUNITY
Start: 2023-03-13 | End: 2023-11-17

## 2023-03-19 RX ORDER — ACETAMINOPHEN 500 MG
1000 TABLET ORAL EVERY 6 HOURS PRN
Status: DISCONTINUED | OUTPATIENT
Start: 2023-03-19 | End: 2023-03-31 | Stop reason: HOSPADM

## 2023-03-19 RX ORDER — IOPAMIDOL 755 MG/ML
90 INJECTION, SOLUTION INTRAVASCULAR ONCE
Status: COMPLETED | OUTPATIENT
Start: 2023-03-19 | End: 2023-03-19

## 2023-03-19 RX ORDER — ROPIVACAINE IN 0.9% SOD CHL/PF 0.1 %
.03-.125 PLASTIC BAG, INJECTION (ML) EPIDURAL CONTINUOUS
Status: DISCONTINUED | OUTPATIENT
Start: 2023-03-19 | End: 2023-03-20 | Stop reason: ALTCHOICE

## 2023-03-19 RX ORDER — HYDROMORPHONE HYDROCHLORIDE 1 MG/ML
0.5 INJECTION, SOLUTION INTRAMUSCULAR; INTRAVENOUS; SUBCUTANEOUS
Status: COMPLETED | OUTPATIENT
Start: 2023-03-19 | End: 2023-03-19

## 2023-03-19 RX ADMIN — HYDROMORPHONE HYDROCHLORIDE 0.5 MG: 1 INJECTION, SOLUTION INTRAMUSCULAR; INTRAVENOUS; SUBCUTANEOUS at 16:10

## 2023-03-19 RX ADMIN — Medication 0.03 MCG/KG/MIN: at 22:04

## 2023-03-19 RX ADMIN — SODIUM CHLORIDE 65 ML: 9 INJECTION, SOLUTION INTRAVENOUS at 17:13

## 2023-03-19 RX ADMIN — ACETAMINOPHEN 1000 MG: 500 TABLET ORAL at 15:12

## 2023-03-19 RX ADMIN — IOPAMIDOL 90 ML: 755 INJECTION, SOLUTION INTRAVENOUS at 17:13

## 2023-03-19 RX ADMIN — SODIUM CHLORIDE, POTASSIUM CHLORIDE, SODIUM LACTATE AND CALCIUM CHLORIDE 150 ML/HR: 600; 310; 30; 20 INJECTION, SOLUTION INTRAVENOUS at 20:14

## 2023-03-19 RX ADMIN — SODIUM CHLORIDE, POTASSIUM CHLORIDE, SODIUM LACTATE AND CALCIUM CHLORIDE 2259 ML: 600; 310; 30; 20 INJECTION, SOLUTION INTRAVENOUS at 16:11

## 2023-03-19 RX ADMIN — ACETAMINOPHEN 1000 MG: 500 TABLET ORAL at 21:14

## 2023-03-19 RX ADMIN — IBUPROFEN 600 MG: 400 TABLET ORAL at 17:48

## 2023-03-19 RX ADMIN — ONDANSETRON 4 MG: 2 INJECTION INTRAMUSCULAR; INTRAVENOUS at 15:11

## 2023-03-19 ASSESSMENT — ACTIVITIES OF DAILY LIVING (ADL)
ADLS_ACUITY_SCORE: 35

## 2023-03-19 NOTE — ED NOTES
Pt's family had followed EMS but are now leaving for home. Pt's son and wife left their numbers in pt's demographic chart and asked for update as decisions are made.   They also notified staff that pt has been diagnosed with dementia, particularly affecting short term memory.

## 2023-03-19 NOTE — ED NOTES
"Returned from CT, hooked up to monitors and attached to IV fluids. Pt lethargic and keeps falling asleep, when pt is touched he wakes up and says \"where am I?\" After he is reoriented he quickly falls back to sleep.   "

## 2023-03-19 NOTE — ED NOTES
Writer was about to give pt tylenol for fever of 103 but pt is actively vomiting at this time. Will wait until nausea subsides.

## 2023-03-19 NOTE — ED TRIAGE NOTES
Pt from [place] via EMS with reports of tremors witnessed by wife. Pt's wife states that his tremors were over entire body. Per EMS, pt has new onset confusion, which is not pt's baseline. Upon assessment, pt's mentation does fluctuate. Pt is unable to accurately report month and initially was able to report that he was in the hospital but did forget after a period of time. Pt is also unsure of the reason for why he is in the ER. Pt is able to follow instructions and make needs known.   EMS placed pt on 2L O2 via n/c, which is not baseline. O2 87%on RA, so n/c 2L kept in place, and O2sats remains stable at 91%. LS cta, and pt denies SOB.   Pt has chronic generalized pain and is currently reporting pain located in lower back.     Pt has temp of 103, visible tremors, and vomited 1x while writer was in room. Interventions: IV zofran administered; 1000mg tylenol for temp and back pain.   Wife at bedside. CNP notified of pt situation and interventions given.

## 2023-03-20 ENCOUNTER — APPOINTMENT (OUTPATIENT)
Dept: MRI IMAGING | Facility: CLINIC | Age: 83
DRG: 871 | End: 2023-03-20
Payer: MEDICARE

## 2023-03-20 PROBLEM — I25.718: Status: ACTIVE | Noted: 2022-05-06

## 2023-03-20 PROBLEM — D64.9 ANEMIA: Status: ACTIVE | Noted: 2023-03-20

## 2023-03-20 PROBLEM — F41.9 ANXIETY: Chronic | Status: ACTIVE | Noted: 2023-03-20

## 2023-03-20 PROBLEM — Z95.2 S/P TAVR (TRANSCATHETER AORTIC VALVE REPLACEMENT): Status: ACTIVE | Noted: 2022-05-06

## 2023-03-20 PROBLEM — F33.41 RECURRENT MAJOR DEPRESSIVE DISORDER, IN PARTIAL REMISSION (H): Chronic | Status: ACTIVE | Noted: 2022-05-06

## 2023-03-20 PROBLEM — Z95.2 S/P TAVR (TRANSCATHETER AORTIC VALVE REPLACEMENT): Chronic | Status: ACTIVE | Noted: 2022-05-06

## 2023-03-20 PROBLEM — G47.33 OSA (OBSTRUCTIVE SLEEP APNEA): Chronic | Status: ACTIVE | Noted: 2022-05-06

## 2023-03-20 PROBLEM — M19.019 LOCALIZED, PRIMARY OSTEOARTHRITIS OF SHOULDER REGION: Status: ACTIVE | Noted: 2019-12-02

## 2023-03-20 PROBLEM — E03.4 HYPOTHYROIDISM DUE TO ACQUIRED ATROPHY OF THYROID: Status: ACTIVE | Noted: 2022-05-06

## 2023-03-20 PROBLEM — E78.5 HYPERLIPIDEMIA LDL GOAL <70: Chronic | Status: ACTIVE | Noted: 2023-03-20

## 2023-03-20 PROBLEM — Z95.1 PRESENCE OF CORONARY ARTERY BYPASS GRAFT STENT: Status: RESOLVED | Noted: 2022-05-06 | Resolved: 2023-03-20

## 2023-03-20 PROBLEM — I10 BENIGN ESSENTIAL HYPERTENSION: Chronic | Status: ACTIVE | Noted: 2023-03-20

## 2023-03-20 PROBLEM — Z86.73 HISTORY OF CVA (CEREBROVASCULAR ACCIDENT): Status: ACTIVE | Noted: 2023-03-20

## 2023-03-20 PROBLEM — A41.9 SEPSIS (H): Status: ACTIVE | Noted: 2023-03-20

## 2023-03-20 PROBLEM — K52.9 COLITIS: Status: ACTIVE | Noted: 2023-03-20

## 2023-03-20 PROBLEM — Z95.5 PRESENCE OF CORONARY ARTERY BYPASS GRAFT STENT: Status: RESOLVED | Noted: 2022-05-06 | Resolved: 2023-03-20

## 2023-03-20 PROBLEM — G30.9 ALZHEIMER'S DISEASE (H): Chronic | Status: ACTIVE | Noted: 2023-03-20

## 2023-03-20 PROBLEM — E03.4 HYPOTHYROIDISM DUE TO ACQUIRED ATROPHY OF THYROID: Chronic | Status: ACTIVE | Noted: 2022-05-06

## 2023-03-20 PROBLEM — Z98.890 HISTORY OF CEA (CAROTID ENDARTERECTOMY): Chronic | Status: ACTIVE | Noted: 2023-03-20

## 2023-03-20 PROBLEM — C61 MALIGNANT TUMOR OF PROSTATE (H): Chronic | Status: ACTIVE | Noted: 2018-03-26

## 2023-03-20 PROBLEM — C61 MALIGNANT TUMOR OF PROSTATE (H): Status: ACTIVE | Noted: 2018-03-26

## 2023-03-20 PROBLEM — F11.90 CHRONIC, CONTINUOUS USE OF OPIOIDS: Chronic | Status: ACTIVE | Noted: 2022-05-06

## 2023-03-20 PROBLEM — Z86.73 HISTORY OF CVA (CEREBROVASCULAR ACCIDENT): Chronic | Status: ACTIVE | Noted: 2023-03-20

## 2023-03-20 PROBLEM — Z98.890 HISTORY OF CEA (CAROTID ENDARTERECTOMY): Status: ACTIVE | Noted: 2023-03-20

## 2023-03-20 PROBLEM — F32.A DEPRESSION: Chronic | Status: ACTIVE | Noted: 2023-03-20

## 2023-03-20 PROBLEM — F02.80 ALZHEIMER'S DISEASE (H): Chronic | Status: ACTIVE | Noted: 2023-03-20

## 2023-03-20 PROBLEM — I25.718: Chronic | Status: ACTIVE | Noted: 2022-05-06

## 2023-03-20 PROBLEM — F11.90 CHRONIC, CONTINUOUS USE OF OPIOIDS: Status: ACTIVE | Noted: 2022-05-06

## 2023-03-20 LAB
ALBUMIN SERPL BCG-MCNC: 2.6 G/DL (ref 3.5–5.2)
ALP SERPL-CCNC: 72 U/L (ref 40–129)
ALT SERPL W P-5'-P-CCNC: 18 U/L (ref 10–50)
ANION GAP SERPL CALCULATED.3IONS-SCNC: 7 MMOL/L (ref 7–15)
AST SERPL W P-5'-P-CCNC: 27 U/L (ref 10–50)
BILIRUB SERPL-MCNC: 0.5 MG/DL
BUN SERPL-MCNC: 20.8 MG/DL (ref 8–23)
C DIFF TOX B STL QL: NEGATIVE
CALCIUM SERPL-MCNC: 8.7 MG/DL (ref 8.8–10.2)
CHLORIDE SERPL-SCNC: 104 MMOL/L (ref 98–107)
CREAT SERPL-MCNC: 0.83 MG/DL (ref 0.67–1.17)
CRP SERPL-MCNC: 73.21 MG/L
DEPRECATED HCO3 PLAS-SCNC: 26 MMOL/L (ref 22–29)
ENTEROCOCCUS FAECALIS: NOT DETECTED
ENTEROCOCCUS FAECIUM: NOT DETECTED
ERYTHROCYTE [DISTWIDTH] IN BLOOD BY AUTOMATED COUNT: 16.2 % (ref 10–15)
GFR SERPL CREATININE-BSD FRML MDRD: 86 ML/MIN/1.73M2
GLUCOSE BLDC GLUCOMTR-MCNC: 106 MG/DL (ref 70–99)
GLUCOSE BLDC GLUCOMTR-MCNC: 119 MG/DL (ref 70–99)
GLUCOSE BLDC GLUCOMTR-MCNC: 147 MG/DL (ref 70–99)
GLUCOSE SERPL-MCNC: 94 MG/DL (ref 70–99)
HCT VFR BLD AUTO: 31 % (ref 40–53)
HGB BLD-MCNC: 9.9 G/DL (ref 13.3–17.7)
LISTERIA SPECIES (DETECTED/NOT DETECTED): NOT DETECTED
MAGNESIUM SERPL-MCNC: 1.6 MG/DL (ref 1.7–2.3)
MCH RBC QN AUTO: 31.4 PG (ref 26.5–33)
MCHC RBC AUTO-ENTMCNC: 31.9 G/DL (ref 31.5–36.5)
MCV RBC AUTO: 98 FL (ref 78–100)
PLATELET # BLD AUTO: 136 10E3/UL (ref 150–450)
POTASSIUM SERPL-SCNC: 3.9 MMOL/L (ref 3.4–5.3)
PROT SERPL-MCNC: 4.9 G/DL (ref 6.4–8.3)
RBC # BLD AUTO: 3.15 10E6/UL (ref 4.4–5.9)
SODIUM SERPL-SCNC: 137 MMOL/L (ref 136–145)
STAPHYLOCOCCUS AUREUS: NOT DETECTED
STAPHYLOCOCCUS EPIDERMIDIS: NOT DETECTED
STAPHYLOCOCCUS LUGDUNENSIS: NOT DETECTED
STAPHYLOCOCCUS SPECIES: NOT DETECTED
STREPTOCOCCUS AGALACTIAE: NOT DETECTED
STREPTOCOCCUS ANGINOSUS GROUP: NOT DETECTED
STREPTOCOCCUS PNEUMONIAE: NOT DETECTED
STREPTOCOCCUS PYOGENES: NOT DETECTED
STREPTOCOCCUS SPECIES: NOT DETECTED
TROPONIN T SERPL HS-MCNC: 73 NG/L
WBC # BLD AUTO: 13.6 10E3/UL (ref 4–11)

## 2023-03-20 PROCEDURE — 83735 ASSAY OF MAGNESIUM: CPT | Performed by: INTERNAL MEDICINE

## 2023-03-20 PROCEDURE — A9585 GADOBUTROL INJECTION: HCPCS | Performed by: INTERNAL MEDICINE

## 2023-03-20 PROCEDURE — 87040 BLOOD CULTURE FOR BACTERIA: CPT | Performed by: INTERNAL MEDICINE

## 2023-03-20 PROCEDURE — 250N000013 HC RX MED GY IP 250 OP 250 PS 637

## 2023-03-20 PROCEDURE — 85027 COMPLETE CBC AUTOMATED: CPT

## 2023-03-20 PROCEDURE — 258N000003 HC RX IP 258 OP 636: Performed by: EMERGENCY MEDICINE

## 2023-03-20 PROCEDURE — 99233 SBSQ HOSP IP/OBS HIGH 50: CPT | Performed by: INTERNAL MEDICINE

## 2023-03-20 PROCEDURE — G1010 CDSM STANSON: HCPCS

## 2023-03-20 PROCEDURE — 258N000003 HC RX IP 258 OP 636: Performed by: INTERNAL MEDICINE

## 2023-03-20 PROCEDURE — 87328 CRYPTOSPORIDIUM AG IA: CPT | Performed by: EMERGENCY MEDICINE

## 2023-03-20 PROCEDURE — 250N000013 HC RX MED GY IP 250 OP 250 PS 637: Performed by: INTERNAL MEDICINE

## 2023-03-20 PROCEDURE — 87329 GIARDIA AG IA: CPT | Performed by: EMERGENCY MEDICINE

## 2023-03-20 PROCEDURE — 87506 IADNA-DNA/RNA PROBE TQ 6-11: CPT | Performed by: EMERGENCY MEDICINE

## 2023-03-20 PROCEDURE — 255N000002 HC RX 255 OP 636: Performed by: INTERNAL MEDICINE

## 2023-03-20 PROCEDURE — 87493 C DIFF AMPLIFIED PROBE: CPT | Performed by: EMERGENCY MEDICINE

## 2023-03-20 PROCEDURE — 84484 ASSAY OF TROPONIN QUANT: CPT

## 2023-03-20 PROCEDURE — 250N000011 HC RX IP 250 OP 636

## 2023-03-20 PROCEDURE — 120N000004 HC R&B MS OVERFLOW

## 2023-03-20 PROCEDURE — 87209 SMEAR COMPLEX STAIN: CPT | Performed by: EMERGENCY MEDICINE

## 2023-03-20 PROCEDURE — 36415 COLL VENOUS BLD VENIPUNCTURE: CPT | Performed by: INTERNAL MEDICINE

## 2023-03-20 PROCEDURE — 80053 COMPREHEN METABOLIC PANEL: CPT

## 2023-03-20 PROCEDURE — 250N000011 HC RX IP 250 OP 636: Performed by: INTERNAL MEDICINE

## 2023-03-20 PROCEDURE — 86140 C-REACTIVE PROTEIN: CPT | Performed by: INTERNAL MEDICINE

## 2023-03-20 RX ORDER — ONDANSETRON 2 MG/ML
4 INJECTION INTRAMUSCULAR; INTRAVENOUS EVERY 6 HOURS PRN
Status: DISCONTINUED | OUTPATIENT
Start: 2023-03-20 | End: 2023-03-21

## 2023-03-20 RX ORDER — NALOXONE HYDROCHLORIDE 0.4 MG/ML
0.4 INJECTION, SOLUTION INTRAMUSCULAR; INTRAVENOUS; SUBCUTANEOUS
Status: DISCONTINUED | OUTPATIENT
Start: 2023-03-20 | End: 2023-03-31 | Stop reason: HOSPADM

## 2023-03-20 RX ORDER — NOREPINEPHRINE BITARTRATE 0.02 MG/ML
.01-.6 INJECTION, SOLUTION INTRAVENOUS CONTINUOUS
Status: DISCONTINUED | OUTPATIENT
Start: 2023-03-20 | End: 2023-03-20

## 2023-03-20 RX ORDER — CLOPIDOGREL BISULFATE 75 MG/1
75 TABLET ORAL DAILY
Status: DISCONTINUED | OUTPATIENT
Start: 2023-03-20 | End: 2023-03-31 | Stop reason: HOSPADM

## 2023-03-20 RX ORDER — SODIUM CHLORIDE, SODIUM LACTATE, POTASSIUM CHLORIDE, CALCIUM CHLORIDE 600; 310; 30; 20 MG/100ML; MG/100ML; MG/100ML; MG/100ML
INJECTION, SOLUTION INTRAVENOUS CONTINUOUS
Status: DISCONTINUED | OUTPATIENT
Start: 2023-03-20 | End: 2023-03-26

## 2023-03-20 RX ORDER — NICOTINE POLACRILEX 4 MG
15-30 LOZENGE BUCCAL
Status: DISCONTINUED | OUTPATIENT
Start: 2023-03-20 | End: 2023-03-31 | Stop reason: HOSPADM

## 2023-03-20 RX ORDER — DONEPEZIL HYDROCHLORIDE 10 MG/1
10 TABLET, FILM COATED ORAL AT BEDTIME
Status: DISCONTINUED | OUTPATIENT
Start: 2023-03-20 | End: 2023-03-31 | Stop reason: HOSPADM

## 2023-03-20 RX ORDER — FUROSEMIDE 20 MG
20 TABLET ORAL DAILY
Status: DISCONTINUED | OUTPATIENT
Start: 2023-03-20 | End: 2023-03-31 | Stop reason: HOSPADM

## 2023-03-20 RX ORDER — LOSARTAN POTASSIUM 50 MG/1
50 TABLET ORAL DAILY
Status: DISCONTINUED | OUTPATIENT
Start: 2023-03-20 | End: 2023-03-25

## 2023-03-20 RX ORDER — DEXTROSE MONOHYDRATE 25 G/50ML
25-50 INJECTION, SOLUTION INTRAVENOUS
Status: DISCONTINUED | OUTPATIENT
Start: 2023-03-20 | End: 2023-03-31 | Stop reason: HOSPADM

## 2023-03-20 RX ORDER — FENTANYL 12.5 UG/1
12 PATCH TRANSDERMAL
Status: DISCONTINUED | OUTPATIENT
Start: 2023-03-20 | End: 2023-03-20

## 2023-03-20 RX ORDER — GADOBUTROL 604.72 MG/ML
8 INJECTION INTRAVENOUS ONCE
Status: COMPLETED | OUTPATIENT
Start: 2023-03-20 | End: 2023-03-20

## 2023-03-20 RX ORDER — NALOXONE HYDROCHLORIDE 0.4 MG/ML
0.2 INJECTION, SOLUTION INTRAMUSCULAR; INTRAVENOUS; SUBCUTANEOUS
Status: DISCONTINUED | OUTPATIENT
Start: 2023-03-20 | End: 2023-03-31 | Stop reason: HOSPADM

## 2023-03-20 RX ORDER — TRAZODONE HYDROCHLORIDE 100 MG/1
100 TABLET ORAL AT BEDTIME
Status: DISCONTINUED | OUTPATIENT
Start: 2023-03-20 | End: 2023-03-31 | Stop reason: HOSPADM

## 2023-03-20 RX ORDER — OXYCODONE HYDROCHLORIDE 5 MG/1
5 TABLET ORAL 3 TIMES DAILY PRN
Status: DISCONTINUED | OUTPATIENT
Start: 2023-03-20 | End: 2023-03-31 | Stop reason: HOSPADM

## 2023-03-20 RX ORDER — VANCOMYCIN HYDROCHLORIDE 1 G/200ML
1000 INJECTION, SOLUTION INTRAVENOUS ONCE
Status: COMPLETED | OUTPATIENT
Start: 2023-03-20 | End: 2023-03-20

## 2023-03-20 RX ORDER — LEVOTHYROXINE SODIUM 50 UG/1
50 TABLET ORAL DAILY
Status: DISCONTINUED | OUTPATIENT
Start: 2023-03-20 | End: 2023-03-31 | Stop reason: HOSPADM

## 2023-03-20 RX ORDER — BUSPIRONE HYDROCHLORIDE 15 MG/1
15 TABLET ORAL EVERY 8 HOURS
Status: DISCONTINUED | OUTPATIENT
Start: 2023-03-20 | End: 2023-03-31 | Stop reason: HOSPADM

## 2023-03-20 RX ORDER — METRONIDAZOLE 500 MG/100ML
500 INJECTION, SOLUTION INTRAVENOUS EVERY 8 HOURS
Status: DISCONTINUED | OUTPATIENT
Start: 2023-03-20 | End: 2023-03-26

## 2023-03-20 RX ORDER — ATORVASTATIN CALCIUM 20 MG/1
60 TABLET, FILM COATED ORAL EVERY EVENING
Status: DISCONTINUED | OUTPATIENT
Start: 2023-03-20 | End: 2023-03-31 | Stop reason: HOSPADM

## 2023-03-20 RX ORDER — GADOBUTROL 604.72 MG/ML
9 INJECTION INTRAVENOUS ONCE
Status: DISCONTINUED | OUTPATIENT
Start: 2023-03-20 | End: 2023-03-31 | Stop reason: HOSPADM

## 2023-03-20 RX ORDER — ONDANSETRON 4 MG/1
4 TABLET, ORALLY DISINTEGRATING ORAL EVERY 6 HOURS PRN
Status: DISCONTINUED | OUTPATIENT
Start: 2023-03-20 | End: 2023-03-21

## 2023-03-20 RX ORDER — FENTANYL 12.5 UG/1
12 PATCH TRANSDERMAL
Status: DISCONTINUED | OUTPATIENT
Start: 2023-03-20 | End: 2023-03-31 | Stop reason: HOSPADM

## 2023-03-20 RX ORDER — ASPIRIN 81 MG/1
81 TABLET, CHEWABLE ORAL DAILY
Status: DISCONTINUED | OUTPATIENT
Start: 2023-03-20 | End: 2023-03-31 | Stop reason: HOSPADM

## 2023-03-20 RX ADMIN — SERTRALINE HYDROCHLORIDE 150 MG: 100 TABLET ORAL at 08:04

## 2023-03-20 RX ADMIN — VANCOMYCIN HYDROCHLORIDE 1000 MG: 1 INJECTION, SOLUTION INTRAVENOUS at 12:10

## 2023-03-20 RX ADMIN — SODIUM CHLORIDE, POTASSIUM CHLORIDE, SODIUM LACTATE AND CALCIUM CHLORIDE: 600; 310; 30; 20 INJECTION, SOLUTION INTRAVENOUS at 16:58

## 2023-03-20 RX ADMIN — TRAZODONE HYDROCHLORIDE 100 MG: 100 TABLET ORAL at 21:29

## 2023-03-20 RX ADMIN — METRONIDAZOLE 500 MG: 500 INJECTION, SOLUTION INTRAVENOUS at 10:17

## 2023-03-20 RX ADMIN — FENTANYL 1 PATCH: 12 PATCH, EXTENDED RELEASE TRANSDERMAL at 12:49

## 2023-03-20 RX ADMIN — OXYCODONE HYDROCHLORIDE 5 MG: 5 TABLET ORAL at 08:51

## 2023-03-20 RX ADMIN — METRONIDAZOLE 500 MG: 500 INJECTION, SOLUTION INTRAVENOUS at 17:32

## 2023-03-20 RX ADMIN — CEFEPIME 2 G: 2 INJECTION, POWDER, FOR SOLUTION INTRAVENOUS at 09:27

## 2023-03-20 RX ADMIN — SODIUM CHLORIDE 50 ML: 9 INJECTION, SOLUTION INTRAVENOUS at 12:06

## 2023-03-20 RX ADMIN — CEFEPIME 2 G: 2 INJECTION, POWDER, FOR SOLUTION INTRAVENOUS at 16:53

## 2023-03-20 RX ADMIN — LEVOTHYROXINE SODIUM 50 MCG: 50 TABLET ORAL at 08:04

## 2023-03-20 RX ADMIN — CEFEPIME 2 G: 2 INJECTION, POWDER, FOR SOLUTION INTRAVENOUS at 01:21

## 2023-03-20 RX ADMIN — TRAZODONE HYDROCHLORIDE 100 MG: 100 TABLET ORAL at 00:53

## 2023-03-20 RX ADMIN — ASPIRIN 81 MG CHEWABLE TABLET 81 MG: 81 TABLET CHEWABLE at 08:05

## 2023-03-20 RX ADMIN — BUSPIRONE HYDROCHLORIDE 15 MG: 15 TABLET ORAL at 00:53

## 2023-03-20 RX ADMIN — OXYCODONE HYDROCHLORIDE 5 MG: 5 TABLET ORAL at 16:53

## 2023-03-20 RX ADMIN — OXYCODONE HYDROCHLORIDE 5 MG: 5 TABLET ORAL at 23:04

## 2023-03-20 RX ADMIN — DONEPEZIL HYDROCHLORIDE 10 MG: 10 TABLET ORAL at 21:29

## 2023-03-20 RX ADMIN — BUSPIRONE HYDROCHLORIDE 15 MG: 15 TABLET ORAL at 16:53

## 2023-03-20 RX ADMIN — DONEPEZIL HYDROCHLORIDE 10 MG: 10 TABLET ORAL at 00:53

## 2023-03-20 RX ADMIN — METRONIDAZOLE 500 MG: 500 INJECTION, SOLUTION INTRAVENOUS at 01:55

## 2023-03-20 RX ADMIN — ATORVASTATIN CALCIUM 60 MG: 20 TABLET, FILM COATED ORAL at 00:52

## 2023-03-20 RX ADMIN — OXYCODONE HYDROCHLORIDE 5 MG: 5 TABLET ORAL at 00:53

## 2023-03-20 RX ADMIN — CLOPIDOGREL BISULFATE 75 MG: 75 TABLET ORAL at 08:05

## 2023-03-20 RX ADMIN — VANCOMYCIN HYDROCHLORIDE 750 MG: 5 INJECTION, POWDER, LYOPHILIZED, FOR SOLUTION INTRAVENOUS at 21:30

## 2023-03-20 RX ADMIN — GADOBUTROL 8 ML: 604.72 INJECTION INTRAVENOUS at 12:03

## 2023-03-20 RX ADMIN — ONDANSETRON 4 MG: 2 INJECTION INTRAMUSCULAR; INTRAVENOUS at 00:53

## 2023-03-20 RX ADMIN — BUSPIRONE HYDROCHLORIDE 15 MG: 15 TABLET ORAL at 08:11

## 2023-03-20 RX ADMIN — SODIUM CHLORIDE, POTASSIUM CHLORIDE, SODIUM LACTATE AND CALCIUM CHLORIDE 150 ML/HR: 600; 310; 30; 20 INJECTION, SOLUTION INTRAVENOUS at 07:27

## 2023-03-20 RX ADMIN — BUSPIRONE HYDROCHLORIDE 15 MG: 15 TABLET ORAL at 23:34

## 2023-03-20 RX ADMIN — ATORVASTATIN CALCIUM 60 MG: 20 TABLET, FILM COATED ORAL at 16:53

## 2023-03-20 ASSESSMENT — ACTIVITIES OF DAILY LIVING (ADL)
DEPENDENT_IADLS:: CLEANING;COOKING;LAUNDRY;SHOPPING;MEAL PREPARATION;MEDICATION MANAGEMENT;TRANSPORTATION
CHANGE_IN_FUNCTIONAL_STATUS_SINCE_ONSET_OF_CURRENT_ILLNESS/INJURY: NO
EQUIPMENT_CURRENTLY_USED_AT_HOME: CANE, STRAIGHT;WALKER, STANDARD
ADLS_ACUITY_SCORE: 31
ADLS_ACUITY_SCORE: 35
ADLS_ACUITY_SCORE: 31
TOILETING_ISSUES: YES
ADLS_ACUITY_SCORE: 35
WALKING_OR_CLIMBING_STAIRS: AMBULATION DIFFICULTY, REQUIRES EQUIPMENT
DIFFICULTY_EATING/SWALLOWING: NO
ADLS_ACUITY_SCORE: 35
TOILETING_MANAGEMENT: WEARS BRIEF
ADLS_ACUITY_SCORE: 35
WEAR_GLASSES_OR_BLIND: YES
ADLS_ACUITY_SCORE: 35
ADLS_ACUITY_SCORE: 27
FALL_HISTORY_WITHIN_LAST_SIX_MONTHS: NO
DOING_ERRANDS_INDEPENDENTLY_DIFFICULTY: YES
DRESSING/BATHING_DIFFICULTY: NO
ADLS_ACUITY_SCORE: 31
CONCENTRATING,_REMEMBERING_OR_MAKING_DECISIONS_DIFFICULTY: YES
ADLS_ACUITY_SCORE: 31
WALKING_OR_CLIMBING_STAIRS_DIFFICULTY: YES
VISION_MANAGEMENT: GLASSES

## 2023-03-20 NOTE — ED NOTES
".[facility]   Admission Handoff    The patient is Sina Gomez, 82 year old who arrived in the ED by AMBULANCE from nursing home with a complaint of Tremors (Uncontrollable rigors x 1.5 hours. From [facility]No diarrhea/EMS gave fentanly 50 mics and zofran 4 mg. ), Nausea, and Altered Mental Status  . The patient's current symptoms are new and during this time the symptoms have decreased. In the ED, patient was diagnosed with   Final diagnoses:   None         Needed?: No    Allergies:    Allergies   Allergen Reactions    Penicillins Difficulty breathing       Past Medical Hx:   Past Medical History:   Diagnosis Date    Cerebral artery occlusion with cerebral infarction (H)     Coronary artery disease     Depression     Sleep apnea        Initial vitals were: BP: (!) 143/60  Pulse: 102  Temp: (!) 103.3  F (39.6  C)  Resp: 12  Height: 180.3 cm (5' 11\")  Weight: 93.4 kg (206 lb) (Pt is unsure if this is accurate.)  SpO2: 91 %   Recent vital Signs: /46   Pulse 74   Temp 99.2  F (37.3  C) (Oral)   Resp 24   Ht 1.803 m (5' 11\")   Wt 93.4 kg (206 lb)   SpO2 93%   BMI 28.73 kg/m      Elimination Status: Continent: No and wears briefs. Torres placed in ER    Activity Level: 2 assist    Fall Status: Reason for falls risk:  Mobility and Reason for falls risk: Cognition  bed/chair alarm on, nonskid shoes/slippers when out of bed, arm band in place, patient and family education, assistive device/personal items within reach, activity supervised, and room door open    Baseline Mental status: severe dementia  Current Mental Status changes: at basesline    Infection present or suspected this encounter: cultures pending  Sepsis suspected: No    Isolation type:     Bariatric equipment needed?: No    In the ED these meds were given:   Medications   lactated ringers BOLUS 2,259 mL (0 mLs Intravenous Stopped 3/19/23 1837)     Followed by   lactated ringers infusion (has no " administration in time range)   acetaminophen (TYLENOL) tablet 1,000 mg (1,000 mg Oral $Given 3/19/23 1512)   ondansetron (ZOFRAN) injection 4 mg (4 mg Intravenous $Given 3/19/23 1511)   HYDROmorphone (PF) (DILAUDID) injection 0.5 mg (0.5 mg Intravenous $Given 3/19/23 1610)   iopamidol (ISOVUE-370) solution 90 mL (90 mLs Intravenous $Given 3/19/23 1713)   sodium chloride 0.9 % bag 500mL for CT scan flush use (65 mLs Intravenous $Given 3/19/23 1713)   ibuprofen (ADVIL/MOTRIN) tablet 600 mg (600 mg Oral $Given 3/19/23 1748)       Drips running?  No    Home pump  No    Current LDAs: Peripheral IV: Site left AC; Gauge 20g    Results:   Labs/Imaging  Ordered and Resulted from Time of ED Arrival Up to the Time of Departure from the ED  Results for orders placed or performed during the hospital encounter of 03/19/23 (from the past 24 hour(s))   [facility] Draw    Narrative    The following orders were created for panel order [facility] Draw.  Procedure                               Abnormality         Status                     ---------                               -----------         ------                     Extra Blue Top Tube[[id number]]                              Final result               Extra Red Top Tube[[id number]]                               Final result               Extra Green Top (Lithium...[[id number]]                                                 Extra Purple Top Tube[[id number]]                            Final result               Extra Green Top (Lithium...[[id number]]                      Final result               Extra Green Top (Lithium...[[id number]]                      Final result                 Please view results for these tests on the individual orders.   Extra Blue Top Tube   Result Value Ref Range    Hold Specimen [facility]    Extra Red Top Tube   Result Value Ref Range    Hold Specimen [facility]    Extra Purple Top Tube   Result Value Ref Range    Hold Specimen [facility]    Extra Green Top (Lithium Heparin)  ON ICE   Result Value Ref Range    Hold Specimen [facility]    Extra Green Top (Lithium Heparin) ON ICE   Result Value Ref Range    Hold Specimen [facility]    Lactic Acid STAT   Result Value Ref Range    Lactic Acid 2.1 (H) 0.7 - 2.0 mmol/L   CBC with platelets differential    Narrative    The following orders were created for panel order CBC with platelets differential.  Procedure                               Abnormality         Status                     ---------                               -----------         ------                     CBC with platelets and d...[[id number]]  Abnormal            Final result                 Please view results for these tests on the individual orders.   Comprehensive metabolic panel   Result Value Ref Range    Sodium 138 136 - 145 mmol/L    Potassium 3.9 3.4 - 5.3 mmol/L    Chloride 102 98 - 107 mmol/L    Carbon Dioxide (CO2) 27 22 - 29 mmol/L    Anion Gap 9 7 - 15 mmol/L    Urea Nitrogen 19.9 8.0 - 23.0 mg/dL    Creatinine 0.85 0.67 - 1.17 mg/dL    Calcium 9.5 8.8 - 10.2 mg/dL    Glucose 136 (H) 70 - 99 mg/dL    Alkaline Phosphatase 110 40 - 129 U/L    AST 32 10 - 50 U/L    ALT 21 10 - 50 U/L    Protein Total 6.2 (L) 6.4 - 8.3 g/dL    Albumin 3.5 3.5 - 5.2 g/dL    Bilirubin Total 0.6 <=1.2 mg/dL    GFR Estimate 86 >60 mL/min/1.73m2   Magnesium   Result Value Ref Range    Magnesium 1.6 (L) 1.7 - 2.3 mg/dL   TSH with free T4 reflex   Result Value Ref Range    TSH 5.52 (H) 0.30 - 4.20 uIU/mL   CBC with platelets and differential   Result Value Ref Range    WBC Count 6.1 4.0 - 11.0 10e3/uL    RBC Count 4.05 (L) 4.40 - 5.90 10e6/uL    Hemoglobin 12.3 (L) 13.3 - 17.7 g/dL    Hematocrit 39.5 (L) 40.0 - 53.0 %    MCV 98 78 - 100 fL    MCH 30.4 26.5 - 33.0 pg    MCHC 31.1 (L) 31.5 - 36.5 g/dL    RDW 15.7 (H) 10.0 - 15.0 %    Platelet Count 182 150 - 450 10e3/uL    % Neutrophils 89 %    % Lymphocytes 11 %    % Monocytes 0 %    % Eosinophils 0 %    % Basophils 0 %    % Immature Granulocytes 0 %     NRBCs per 100 WBC 0 <1 /100    Absolute Neutrophils 5.4 1.6 - 8.3 10e3/uL    Absolute Lymphocytes 0.7 (L) 0.8 - 5.3 10e3/uL    Absolute Monocytes 0.0 0.0 - 1.3 10e3/uL    Absolute Eosinophils 0.0 0.0 - 0.7 10e3/uL    Absolute Basophils 0.0 0.0 - 0.2 10e3/uL    Absolute Immature Granulocytes 0.0 <=0.4 10e3/uL    Absolute NRBCs 0.0 10e3/uL   T4 free   Result Value Ref Range    Free T4 0.96 0.90 - 1.70 ng/dL   Symptomatic Influenza A/B, RSV, & SARS-CoV2 PCR (COVID-19) Nose    Specimen: Nose; Swab   Result Value Ref Range    Influenza A PCR Negative Negative    Influenza B PCR Negative Negative    RSV PCR Negative Negative    SARS CoV2 PCR Negative Negative    Narrative    Testing was performed using the Xpert Xpress CoV2/Flu/RSV Assay on the [employer] GeneXpert Instrument. This test should be ordered for the detection of SARS-CoV-2, influenza, and RSV viruses in individuals who meet clinical and/or epidemiological criteria. Test performance is unknown in asymptomatic patients. This test is for in vitro diagnostic use under the FDA EUA for laboratories certified under CLIA to perform high or moderate complexity testing. This test has not been FDA cleared or approved. A negative result does not rule out the presence of PCR inhibitors in the specimen or target RNA in concentration below the limit of detection for the assay. If only one viral target is positive but coinfection with multiple targets is suspected, the sample should be re-tested with another FDA cleared, approved, or authorized test, if coinfection would change clinical management. This test was validated by the [facility]. These laboratories are certified under the Clinical Laboratory Improvement Amendments of 1988 (CLIA-88) as qualified to perform high complexity laboratory testing.   CT Abdomen Pelvis w Contrast    Narrative    EXAM: CT ABDOMEN PELVIS W CONTRAST  LOCATION: [facility]  DATE/TIME: 3/19/2023 5:35  PM    INDICATION: abdominal pain, vomiting  COMPARISON: None.  TECHNIQUE: CT scan of the abdomen and pelvis was performed following injection of IV contrast. Multiplanar reformats were obtained. Dose reduction techniques were used.  CONTRAST:  90 mL Isovue 370    FINDINGS:   LOWER CHEST: Gynecomastia. Replaced aortic valve. Coronary artery calcification. Hiatal hernia. Mild bibasilar atelectasis.    HEPATOBILIARY: Moderately severe intrahepatic biliary ductal dilatation. The common bile duct measures 1.8 cm. No radiopaque gallstone. Cholecystectomy. Simple hepatic cysts.    PANCREAS: The pancreatic duct measures 0.7 cm without abrupt caliber change. No pancreatic mass.    SPLEEN: Normal.    ADRENAL GLANDS: Normal.    KIDNEYS/BLADDER: Cortical scarring in the posterior/upper pole left kidney. Simple renal cysts, no follow-up required. No hydronephrosis. The bladder is nondistended. Circumferential bladder wall thickening measuring up to 1.5 cm.    BOWEL: Mild wall thickening involving the majority of the colon is consistent with colitis. No pneumatosis intestinalis or portal venous gas. Diverticulosis. No obstruction. Normal appendix.    LYMPH NODES: Normal.    VASCULATURE: Moderate atherosclerosis. No aneurysm.    PELVIC ORGANS: Prostatectomy.    MUSCULOSKELETAL: Left hip arthroplasty. No suspicious osseous lesion. Degenerative changes. Median sternotomy.      Impression    IMPRESSION:   1.  Pancolitis which is most likely infectious or inflammatory. Ischemic is felt to be less likely.  2.  Moderately severe biliary ductal dilatation which is greater than expected for reservoir effect. Recommend MRCP.  3.  Mildly dilated pancreatic duct.  4.  Moderate bladder wall thickening which may be due nondistention with chronic outlet obstruction or cystitis.   Lactic acid whole blood   Result Value Ref Range    Lactic Acid 1.4 0.7 - 2.0 mmol/L       For the majority of the shift this patient's behavior was Green     Cardiac  Rhythm: N/A  Pt needs tele? No  Skin/wound Issues: None    Code Status: Full Code    Pain control: pt had none    Nausea control: pt had none    Abnormal labs/tests/findings requiring intervention:     Patient tested for COVID 19 prior to admission: YES     OBS brochure/video discussed/provided to patient/family: Yes     Family present during ED course? Yes     Family Comments/Social Situation comments:     Tasks needing completion: stool samples needed    Ileana Miguel RN

## 2023-03-20 NOTE — ED NOTES
Patient's blood pressures are continuing to trend downward. MD notified. Fluids continued. Patient maintains at baseline mental status.

## 2023-03-20 NOTE — ED PROVIDER NOTES
History     Chief Complaint   Patient presents with     Tremors     Uncontrollable rigors x 1.5 hours. From [facility].  No diarrhea  EMS gave fentanly 50 mics and zofran 4 mg.      Nausea     Altered Mental Status     HPI  Sina Gomez is a 82 year old male  who presents emergency room via EMS for acute onset of rigors, fever, nausea, vomiting, diarrhea.  Onset of symptoms was noon.  Patient lives in assisted living with his wife.  Per wife norovirus is going around the facility.  Past medical history remarkable for dementia, coronary artery disease, status post TAVR, chronic pain with continuous use of opioids.    Allergies:  Allergies   Allergen Reactions     Penicillins Difficulty breathing       Problem List:    Patient Active Problem List    Diagnosis Date Noted     Abdominal pain, generalized 03/19/2023     Priority: Medium     Acute febrile illness 03/19/2023     Priority: Medium     Nausea vomiting and diarrhea 03/19/2023     Priority: Medium     Chronic bilateral thoracic back pain 02/16/2023     Priority: Medium     Due to traumatic injury has had multiple surgeries        Chronic, continuous use of opioids 05/06/2022     Priority: Medium     Hypothyroidism due to acquired atrophy of thyroid 05/06/2022     Priority: Medium     KALIA (obstructive sleep apnea) 05/06/2022     Priority: Medium     Presence of coronary artery bypass graft stent 05/06/2022     Priority: Medium     S/P TAVR (transcatheter aortic valve replacement) 05/06/2022     Priority: Medium     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) 05/06/2022     Priority: Medium     Recurrent major depressive disorder, in partial remission (H) 05/06/2022     Priority: Medium     Localized, primary osteoarthritis of shoulder region 12/02/2019     Priority: Medium     Malignant tumor of prostate (H) 03/26/2018     Priority: Medium        Past Medical History:    Past Medical History:   Diagnosis Date  "    Cerebral artery occlusion with cerebral infarction (H)      Coronary artery disease      Depression      Sleep apnea        Past Surgical History:    Past Surgical History:   Procedure Laterality Date     COLONOSCOPY N/A 2022    Procedure: COLONOSCOPY;  Surgeon: Dexter Schmitt MD;  Location: [facility] OR     CV FEMORAL ANGIOGRAM       HC VALVE (TAVR)       left hip replacement Left 1995     REPLACEMENT TOTAL KNEE Left        Family History:    No family history on file.    Social History:  Marital Status:   [2]  Social History     Tobacco Use     Smoking status: Former     Types: Cigarettes     Quit date:      Years since quittin.2     Smokeless tobacco: Never   Vaping Use     Vaping Use: Never used   Substance Use Topics     Alcohol use: Never     Drug use: Never        Medications:    No current outpatient medications on file.    Review of Systems  As mentioned above in the history present illness. All other systems were reviewed and are negative.    Physical Exam   BP: (!) 143/60  Pulse: 102  Temp: (!) 103.3  F (39.6  C)  Resp: 12  Height: 180.3 cm (5' 11\")  Weight: 93.4 kg (206 lb) (Pt is unsure if this is accurate.)  SpO2: 91 %      Physical Exam  Vitals and nursing note reviewed.   Constitutional:       General: He is not in acute distress.     Appearance: He is well-developed. He is ill-appearing. He is not toxic-appearing or diaphoretic.   HENT:      Head: Normocephalic and atraumatic.      Right Ear: External ear normal.      Left Ear: External ear normal.      Nose: Nose normal.      Mouth/Throat:      Mouth: Mucous membranes are moist.   Eyes:      General:         Right eye: No discharge.         Left eye: No discharge.      Conjunctiva/sclera: Conjunctivae normal.   Cardiovascular:      Rate and Rhythm: Normal rate and regular rhythm.      Heart sounds: Normal heart sounds. No murmur heard.    No friction rub. No gallop.   Pulmonary:      Effort: Pulmonary " effort is normal.      Breath sounds: Normal breath sounds. No stridor. No wheezing.   Abdominal:      General: Bowel sounds are normal. There is no distension.      Palpations: Abdomen is soft.      Tenderness: There is abdominal tenderness (with rebound palpation in all four quadrants). There is no guarding.   Skin:     General: Skin is warm.      Capillary Refill: Capillary refill takes less than 2 seconds.      Findings: No rash.   Neurological:      Mental Status: He is alert and oriented to person, place, and time.      GCS: GCS eye subscore is 4. GCS verbal subscore is 5. GCS motor subscore is 6.      Cranial Nerves: No cranial nerve deficit or facial asymmetry.   Psychiatric:         Mood and Affect: Mood normal.         Cognition and Memory: Memory is impaired.         ED Course              ED Course as of 03/20/23 0108   Sun Mar 19, 2023   1825 Consultation completed with Dr. Machado and reviewed CT findings and lab results and patient's history.  He recommended consultation with GI to determine if patient would need immediate MRCP or if observation admission would be appropriate and MRCP in the morning.   1839 Consultation completed with GI, Dr Rachel, at [place] and they recommended admission and MRCP in the morning.  They advised that if MRCP is abnormal then patient can be transferred at that point in time.  He advised that it is likely the MRCP will be normal given that patient's LFTs are normal.   1903 Consultation completed with hospitalist Margaret Lal who agrees to observation admission.  At this time no antibiotics indicated.   2108 Phone call placed to son, Av Gomez and discussed patient's healthcare status and son reports that his father is indeed DNR/DNI.  Son does request medications for blood pressure management if it is done to alter or improve life status.  Discussed with Av that I have ordered testing to evaluate his heart has possible cause for his declining blood  pressure and I will notify him of the results when they are available.  He agrees to this.  He will discuss with his mother her 's health care status and his siblings.  Levophed for peripheral IV status initiation ordered   2200 Preparation completed for central line.  Dr. Wilber Hooker will be placing central line with the assistance of myself.  Consent signed.   1749 Patient's blood pressure is stable patient on Levophed via central line.  Mental status unchanged.  Most recent blood pressure 111/77.     Procedures  The Lactic acid level is elevated due to dehydration and will administer fluids and reassess lactate., at this time there is no sign of severe sepsis or septic shock.         Results for orders placed or performed during the hospital encounter of 03/19/23 (from the past 24 hour(s))   [place] Draw    Narrative    The following orders were created for panel order [place] Draw.  Procedure                               Abnormality         Status                     ---------                               -----------         ------                     Extra Blue Top Tube[[id number]]                              Final result               Extra Red Top Tube[[id number]]                               Final result               Extra Green Top (Lithium...[[id number]]                                                 Extra Purple Top Tube[[id number]]                            Final result               Extra Green Top (Lithium...[[id number]]                      Final result               Extra Green Top (Lithium...[[id number]]                      Final result                 Please view results for these tests on the individual orders.   Extra Blue Top Tube   Result Value Ref Range    Hold Specimen JIC    Extra Red Top Tube   Result Value Ref Range    Hold Specimen JIC    Extra Purple Top Tube   Result Value Ref Range    Hold Specimen JIC    Extra Green Top (Lithium Heparin) ON ICE   Result Value Ref Range     Hold Specimen [facility]    Extra Green Top (Lithium Heparin) ON ICE   Result Value Ref Range    Hold Specimen JI    Lactic Acid STAT   Result Value Ref Range    Lactic Acid 2.1 (H) 0.7 - 2.0 mmol/L   CBC with platelets differential    Narrative    The following orders were created for panel order CBC with platelets differential.  Procedure                               Abnormality         Status                     ---------                               -----------         ------                     CBC with platelets and d...[[id number]]  Abnormal            Final result                 Please view results for these tests on the individual orders.   Comprehensive metabolic panel   Result Value Ref Range    Sodium 138 136 - 145 mmol/L    Potassium 3.9 3.4 - 5.3 mmol/L    Chloride 102 98 - 107 mmol/L    Carbon Dioxide (CO2) 27 22 - 29 mmol/L    Anion Gap 9 7 - 15 mmol/L    Urea Nitrogen 19.9 8.0 - 23.0 mg/dL    Creatinine 0.85 0.67 - 1.17 mg/dL    Calcium 9.5 8.8 - 10.2 mg/dL    Glucose 136 (H) 70 - 99 mg/dL    Alkaline Phosphatase 110 40 - 129 U/L    AST 32 10 - 50 U/L    ALT 21 10 - 50 U/L    Protein Total 6.2 (L) 6.4 - 8.3 g/dL    Albumin 3.5 3.5 - 5.2 g/dL    Bilirubin Total 0.6 <=1.2 mg/dL    GFR Estimate 86 >60 mL/min/1.73m2   Magnesium   Result Value Ref Range    Magnesium 1.6 (L) 1.7 - 2.3 mg/dL   TSH with free T4 reflex   Result Value Ref Range    TSH 5.52 (H) 0.30 - 4.20 uIU/mL   CBC with platelets and differential   Result Value Ref Range    WBC Count 6.1 4.0 - 11.0 10e3/uL    RBC Count 4.05 (L) 4.40 - 5.90 10e6/uL    Hemoglobin 12.3 (L) 13.3 - 17.7 g/dL    Hematocrit 39.5 (L) 40.0 - 53.0 %    MCV 98 78 - 100 fL    MCH 30.4 26.5 - 33.0 pg    MCHC 31.1 (L) 31.5 - 36.5 g/dL    RDW 15.7 (H) 10.0 - 15.0 %    Platelet Count 182 150 - 450 10e3/uL    % Neutrophils 89 %    % Lymphocytes 11 %    % Monocytes 0 %    % Eosinophils 0 %    % Basophils 0 %    % Immature Granulocytes 0 %    NRBCs per 100 WBC 0 <1 /100     Absolute Neutrophils 5.4 1.6 - 8.3 10e3/uL    Absolute Lymphocytes 0.7 (L) 0.8 - 5.3 10e3/uL    Absolute Monocytes 0.0 0.0 - 1.3 10e3/uL    Absolute Eosinophils 0.0 0.0 - 0.7 10e3/uL    Absolute Basophils 0.0 0.0 - 0.2 10e3/uL    Absolute Immature Granulocytes 0.0 <=0.4 10e3/uL    Absolute NRBCs 0.0 10e3/uL   T4 free   Result Value Ref Range    Free T4 0.96 0.90 - 1.70 ng/dL   Symptomatic Influenza A/B, RSV, & SARS-CoV2 PCR (COVID-19) Nose    Specimen: Nose; Swab   Result Value Ref Range    Influenza A PCR Negative Negative    Influenza B PCR Negative Negative    RSV PCR Negative Negative    SARS CoV2 PCR Negative Negative    Narrative    Testing was performed using the Xpert Xpress CoV2/Flu/RSV Assay on the [employer] GeneXpert Instrument. This test should be ordered for the detection of SARS-CoV-2, influenza, and RSV viruses in individuals who meet clinical and/or epidemiological criteria. Test performance is unknown in asymptomatic patients. This test is for in vitro diagnostic use under the FDA EUA for laboratories certified under CLIA to perform high or moderate complexity testing. This test has not been FDA cleared or approved. A negative result does not rule out the presence of PCR inhibitors in the specimen or target RNA in concentration below the limit of detection for the assay. If only one viral target is positive but coinfection with multiple targets is suspected, the sample should be re-tested with another FDA cleared, approved, or authorized test, if coinfection would change clinical management. This test was validated by the [facility]. These laboratories are certified under the Clinical Laboratory Improvement Amendments of 1988 (CLIA-88) as qualified to perform high complexity laboratory testing.   UA with Microscopic reflex to Culture    Specimen: Urine, Midstream   Result Value Ref Range    Color Urine Light Yellow Colorless, Straw, Light Yellow, Yellow    Appearance Urine Clear  Clear    Glucose Urine Negative Negative mg/dL    Bilirubin Urine Negative Negative    Ketones Urine Negative Negative mg/dL    Specific Gravity Urine 1.005 1.003 - 1.035    Blood Urine Negative Negative    pH Urine 5.0 5.0 - 7.0    Protein Albumin Urine Negative Negative mg/dL    Urobilinogen Urine Normal Normal, 2.0 mg/dL    Nitrite Urine Negative Negative    Leukocyte Esterase Urine Negative Negative    Bacteria Urine Few (A) None Seen /HPF    Mucus Urine Present (A) None Seen /LPF    RBC Urine 2 <=2 /HPF    WBC Urine 3 <=5 /HPF    Squamous Epithelials Urine 1 <=1 /HPF    Narrative    Urine Culture not indicated   CT Abdomen Pelvis w Contrast    Narrative    EXAM: CT ABDOMEN PELVIS W CONTRAST  LOCATION: [facility]  DATE/TIME: 3/19/2023 5:35 PM    INDICATION: abdominal pain, vomiting  COMPARISON: None.  TECHNIQUE: CT scan of the abdomen and pelvis was performed following injection of IV contrast. Multiplanar reformats were obtained. Dose reduction techniques were used.  CONTRAST:  90 mL Isovue 370    FINDINGS:   LOWER CHEST: Gynecomastia. Replaced aortic valve. Coronary artery calcification. Hiatal hernia. Mild bibasilar atelectasis.    HEPATOBILIARY: Moderately severe intrahepatic biliary ductal dilatation. The common bile duct measures 1.8 cm. No radiopaque gallstone. Cholecystectomy. Simple hepatic cysts.    PANCREAS: The pancreatic duct measures 0.7 cm without abrupt caliber change. No pancreatic mass.    SPLEEN: Normal.    ADRENAL GLANDS: Normal.    KIDNEYS/BLADDER: Cortical scarring in the posterior/upper pole left kidney. Simple renal cysts, no follow-up required. No hydronephrosis. The bladder is nondistended. Circumferential bladder wall thickening measuring up to 1.5 cm.    BOWEL: Mild wall thickening involving the majority of the colon is consistent with colitis. No pneumatosis intestinalis or portal venous gas. Diverticulosis. No obstruction. Normal appendix.    LYMPH NODES:  Normal.    VASCULATURE: Moderate atherosclerosis. No aneurysm.    PELVIC ORGANS: Prostatectomy.    MUSCULOSKELETAL: Left hip arthroplasty. No suspicious osseous lesion. Degenerative changes. Median sternotomy.      Impression    IMPRESSION:   1.  Pancolitis which is most likely infectious or inflammatory. Ischemic is felt to be less likely.  2.  Moderately severe biliary ductal dilatation which is greater than expected for reservoir effect. Recommend MRCP.  3.  Mildly dilated pancreatic duct.  4.  Moderate bladder wall thickening which may be due nondistention with chronic outlet obstruction or cystitis.   Lactic acid whole blood   Result Value Ref Range    Lactic Acid 1.4 0.7 - 2.0 mmol/L   Troponin T, High Sensitivity   Result Value Ref Range    Troponin T, High Sensitivity 80 (H) <=22 ng/L   POC US GUIDANCE NEEDLE PLACEMENT    Narrative    Limited bedside ultrasound for ultrasound guidance, performed and interpreted by me.   Indication: central line  Compression ultrasonography was performed  The right neck examined.    Examination of the neck shows compressible vein, compatible with right IJ.    Findings  Right internal jugular vein easily identified, compressible, and accessible  IMPRESSION: right internal jugular present and accessible for internal jugular central line placement.   XR Chest Port 1 View    Narrative    EXAM: XR CHEST PORT 1 VIEW  LOCATION: [facility]  DATE/TIME: 3/19/2023 11:03 PM    INDICATION: confirm right IJ central line placement  COMPARISON: None.      Impression    IMPRESSION: Right internal jugular central venous catheter with tip overlying the superior cavoatrial junction. Prior median sternotomy. Aortic valve replacement and coronary artery atherosclerotic calcifications. Borderline cardiac enlargement without   pulmonary vascular congestion. Left basilar atelectasis. No large pleural effusion or focal pulmonary consolidation otherwise. No acute osseous  abnormality.   Glucose by meter   Result Value Ref Range    GLUCOSE BY METER POCT 119 (H) 70 - 99 mg/dL       Medications   lactated ringers BOLUS 2,259 mL (0 mLs Intravenous Stopped 3/19/23 1837)     Followed by   lactated ringers infusion (150 mL/hr Intravenous $New Bag 3/19/23 2014)   acetaminophen (TYLENOL) tablet 1,000 mg (1,000 mg Oral $Given 3/19/23 2114)   aspirin (ASA) chewable tablet 81 mg (has no administration in time range)   atorvastatin (LIPITOR) tablet 60 mg (60 mg Oral $Given 3/20/23 0052)   busPIRone (BUSPAR) tablet 15 mg (15 mg Oral $Given 3/20/23 0053)   clopidogrel (PLAVIX) tablet 75 mg (has no administration in time range)   donepezil (ARICEPT) tablet 10 mg (10 mg Oral $Given 3/20/23 0053)   furosemide (LASIX) tablet 20 mg ( Oral Automatically Held 3/23/23 0800)   levothyroxine (SYNTHROID/LEVOTHROID) tablet 50 mcg (has no administration in time range)   losartan (COZAAR) tablet 50 mg ( Oral Automatically Held 3/23/23 0800)   oxyCODONE (ROXICODONE) tablet 5 mg (5 mg Oral $Given 3/20/23 0053)   sertraline (ZOLOFT) tablet 150 mg (has no administration in time range)   traZODone (DESYREL) tablet 100 mg (100 mg Oral $Given 3/20/23 0053)   glucose gel 15-30 g (has no administration in time range)     Or   dextrose 50 % injection 25-50 mL (has no administration in time range)     Or   glucagon injection 1 mg (has no administration in time range)   ondansetron (ZOFRAN ODT) ODT tab 4 mg ( Oral See Alternative 3/20/23 0053)     Or   ondansetron (ZOFRAN) injection 4 mg (4 mg Intravenous $Given 3/20/23 0053)   naloxone (NARCAN) injection 0.2 mg (has no administration in time range)     Or   naloxone (NARCAN) injection 0.4 mg (has no administration in time range)     Or   naloxone (NARCAN) injection 0.2 mg (has no administration in time range)     Or   naloxone (NARCAN) injection 0.4 mg (has no administration in time range)   norepinephrine (LEVOPHED) 4 mg in  mL infusion PREMIX (has no administration  in time range)   piperacillin-tazobactam (ZOSYN) infusion 3.375 g (has no administration in time range)   acetaminophen (TYLENOL) tablet 1,000 mg (1,000 mg Oral $Given 3/19/23 1512)   ondansetron (ZOFRAN) injection 4 mg (4 mg Intravenous $Given 3/19/23 1511)   HYDROmorphone (PF) (DILAUDID) injection 0.5 mg (0.5 mg Intravenous $Given 3/19/23 1610)   iopamidol (ISOVUE-370) solution 90 mL (90 mLs Intravenous $Given 3/19/23 1713)   sodium chloride 0.9 % bag 500mL for CT scan flush use (65 mLs Intravenous $Given 3/19/23 1713)   ibuprofen (ADVIL/MOTRIN) tablet 600 mg (600 mg Oral $Given 3/19/23 1748)       Assessments & Plan (with Medical Decision Making)     I have reviewed the nursing notes.    I have reviewed the findings, diagnosis, plan and need for follow up with the patient.  82-year-old male with past medical history of dementia presents via EMS with a cute onset of rigors, abdominal pain, fevers, nausea, vomiting, diarrhea.  Patient lives in assisted living with his wife and per wife norovirus is going around the facility.  On initial exam patient is alert and orientated to self but otherwise confused.  Temperature is elevated at 103.1.  Blood pressure elevated initially and subsequently softened patient was never tachycardic.  Work-up to be evaluated for sepsis and initial lactate was elevated white blood cell count reveals no leukocytosis and normal renal and hepatic function.  Patient was treated with Tylenol and ibuprofen for fever and fluid administration given for the nausea, vomiting, diarrhea.  CT abdomen pelvis reveals possible pancolitis and enlarged biliary ductal dilatation with recommendation for MRCP.  Consultation completed with Dr. Machado regarding this finding and he recommends further consultation with GI.  Consultation with GI recommended obtaining stool cultures and proceed with MRCP and tomorrow morning would be early enough.  Discussed and clarified whether antibiotics are indicated and  at this point in time antibiotics are not indicated for the CT findings.  Discussed the case with hospitalist and requested observation admission.  She agrees to observation admission.  Stool orders placed, will place order for MRCP as well.  Observation admission orders placed.  Dr. Wilber kirkland collaborative physician on care of this patient.  Following consultation, patient blood pressures continue to soften and decision made to initiate Levophed and central line placement completed with Dr. Wilber Hooker.  Patient responsive to Levophed and subsequently transferred to ICU    Medical Decision Making  The patient's presentation was of high complexity (a chronic illness severe exacerbation, progression, or side effect of treatment).    The patient's evaluation involved:  review of external note(s) from 3+ sources (see separate area of note for details)  ordering and/or review of 3+ test(s) in this encounter (see separate area of note for details)  strong consideration of a test (see separate area of note for details) that was ultimately deferred  independent interpretation of testing performed by another health professional (see separate area of note for details)  discussion of management or test interpretation with another health professional (see separate area of note for details)    The patient's management necessitated high risk (a decision regarding hospitalization).        Current Discharge Medication List          Final diagnoses:   Abdominal pain, generalized   Nausea vomiting and diarrhea   Acute febrile illness       3/19/2023   [facility] EMERGENCY DEPT     Stephanie Yuan APRN CNP  03/19/23 2008       Stephanie Yuan APRN CNP  03/19/23 0396       Stephanie Yuan APRN CNP  03/20/23 0110

## 2023-03-20 NOTE — ED NOTES
Pt made 2 separate attempts to urinate, stating that he felt the need to do so but was unsuccessful both times. Pt also had wet brief from urinary incontinence. Writer spoke with CNP, who instructed to place indwelling lópez catheter. Catheter placed successfully, and urine sample sent to lab. Nadya-cares completed, and pt assisted to reposition for comfort.
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
## 2023-03-20 NOTE — ED NOTES
Pt BP continued to trend down dispite more fluids, and MD assessing fluid status with US.  Levophed qtt ordered and started.  Charge nurse aware of need for ICU admit.  MD placed central line with assist of House Sup., pt tolerated well.    Portable CXR completed and Levophed running now in Central Line.  Peripheral site remains CDI, no reddness noted.       Provider has called family and they were updated via phone, and they are here now with pt..  Provider in room with them discussing all tests results and plan of care.        Pt resting comfortable, mental status remains at baseline.    PLAN:  Awaiting bed placement.
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
## 2023-03-20 NOTE — PROGRESS NOTES
[facility] TRANSPORT NOTE  Data:   Reason for Transport:  [facility]    Sina Gomez was transported to MRI via wheel chair at 1102.  Patient was accompanied by MRI tech. Equipment used for transport: Oxygen  Nasal Cannula. Family was aware of reason for transport: yes      Dr. Pj lopez for patient to go to MRI off of monitoring equipment.  Fentanyl patch removed.     Response:  Patient's condition when transferred off unit was stable.    Rocío Lopez RN

## 2023-03-20 NOTE — ED PROVIDER NOTES
ED MD note:      Patient discussed with Stephanie Yuan around 9:30pm.  Discussed patient's presentation and I evaluated the patient personallly including review of testing results.  Patient had originally presented to the emergency department for fever, vomiting, diarrhea, and rigors.  Patient lives in assisted living.  Norovirus in the facility.  Arrival temperature 103.3 degrees, with blood pressure normal upon arrival.    Labs showing white blood cell count which is normal.  Lactate slightly elevated.  CT scan showing pancolitis.  Was discussed with surgical and GI colleagues by previous provider, recommending stool cultures, and MRCP tomorrow.  No indication for antibiotics after discussion with GI specialist.    Patient has received 30 cc/kg fluid bolus.  Remains hypotensive, and therefore decision made for central line.  Central line was performed as documented below.  Patient started on peripheral, followed by central norepinephrine.    Patient is resting in bed comfortably, mild diffuse abdominal tenderness, and otherwise nonfocal exam        [facility]    -Central Line    Date/Time: 3/19/2023 10:53 PM  Performed by: Wilber Hooker MD  Authorized by: Wilber Hooker MD     Risks, benefits and alternatives discussed.      PRE-PROCEDURE DETAILS:     Hand hygiene: Hand hygiene performed prior to insertion      Sterile barrier technique: All elements of maximal sterile technique followed      Skin preparation:  ChloraPrep    Skin preparation agent: Skin preparation agent completely dried prior to procedure         ANESTHESIA    Anesthesia: Local infiltration  Local Anesthetic:  Bupivacaine 0.5% without epinephrine    PROCEDURE DETAILS:     Location:  R internal jugular    Patient position:  Flat    Procedural supplies:  Triple lumen    Landmarks identified: yes      Ultrasound guidance: yes      Sterile ultrasound techniques: Sterile gel and sterile probe covers were used       Number of attempts:  1    Successful placement: yes      POST PROCEDURE DETAILS:      Post-procedure:  Dressing applied and line sutured    Assessment:  Blood return through all ports, no pneumothorax on x-ray, free fluid flow and placement verified by x-ray    PROCEDURE    Patient Tolerance:  Patient tolerated the procedure well with no immediate complications          Critical Care Addendum    My initial assessment, based on my review of nursing observations, review of vital signs, focused history and physical exam, established that Sina Gomez has severe hypotension, which requires immediate intervention, and therefore He is critically ill.     After the initial assessment, the care team initiated multiple lab tests, initiated IV fluid administration, initiated medication therapy with Norepinephrine and achieved central venous access to provide stabilization care. Due to the critical nature of this patient, I reassessed vital signs, physical exam and mental status multiple times prior to His disposition.     Time also spent performing documentation, reviewing test results and coordination of care.     Critical care time (excluding teaching time and procedures): 60 minutes.        Wilber Hooker MD  03/20/23 0123

## 2023-03-20 NOTE — ED NOTES
Observation Brochure     Patient and family informed of observation status based on provider's order.  Observation brochure was given. Patient/Family stated understanding. Questions answered.  Nallely Choi RN

## 2023-03-20 NOTE — PHARMACY-VANCOMYCIN DOSING SERVICE
Pharmacy Vancomycin Initial Note  Date of Service 2023  Patient's  1940  82 year old, male    Indication: Bacteremia    Current estimated CrCl = Estimated Creatinine Clearance: 77.3 mL/min (based on SCr of 0.83 mg/dL).    Creatinine for last 3 days  3/19/2023:  3:09 PM Creatinine 0.85 mg/dL  3/20/2023:  6:16 AM Creatinine 0.83 mg/dL    Recent Vancomycin Level(s) for last 3 days  No results found for requested labs within last 72 hours.      Vancomycin IV Administrations (past 72 hours)      No vancomycin orders with administrations in past 72 hours.                Nephrotoxins and other renal medications (From now, onward)    Start     Dose/Rate Route Frequency Ordered Stop    23 0800  [Held by provider]  furosemide (LASIX) tablet 20 mg        (Held by provider since Mon 3/20/2023 at 0100 by Jayda Lal PA-C.Hold Reason: Change in Vitals)   Note to Pharmacy: PTA Sig:TAKE 1 TABLET BY MOUTH DAILY      20 mg Oral DAILY 23 0027            Contrast Orders - past 72 hours (72h ago, onward)    Start     Dose/Rate Route Frequency Stop    23 1535  iopamidol (ISOVUE-370) solution 90 mL         90 mL Intravenous ONCE 23 1713          [employer] Prediction of Planned Initial Vancomycin Regimen    Loading dose: 1000 mg at 10:30 2023.  Regimen: 750 mg IV every 12 hours.  Start time: 10:15 on 2023  Exposure target: AUC24 (range)400-600 mg/L.hr   AUC24,ss: 457 mg/L.hr  Probability of AUC24 > 400: 65 %  Ctrough,ss: 15.3 mg/L  Probability of Ctrough,ss > 20: 25 %  Probability of nephrotoxicity (Lodise BRAULIO ): 11 %        Plan:  1. Start vancomycin  1000 mg IV once as loading dose, followed by 750 mg IV q12h.   2. Vancomycin monitoring method: AUC  3. Vancomycin therapeutic monitoring goal: 400-600 mg*h/L  4. Pharmacy will check vancomycin levels as appropriate in 1-3 Days.    5. Serum creatinine levels will be ordered daily for the first week of therapy and at least  twice weekly for subsequent weeks.      Thank you,  Nettie Balbuena RPH

## 2023-03-20 NOTE — PLAN OF CARE
End Of Shift Note     Situation: Altered mental status, pancolitis with bilary duct dilation      Plan: MRCP today     Subjective/Objective:     Neuro: Alert to self-reorientation provided, complains of restless legs     Cardiac: VSS, afebrile     Resp: LS clear, 2L NC     GI/: urethral catheter-patent, clear liquid diet, denies n/v/d     MSK: generalized weakness, walker/gb      Skin: None     LDAs: RIJ, PIV

## 2023-03-20 NOTE — PROGRESS NOTES
"[facility] [facility] – [place], [place] ADMISSION NOTE    Patient admitted to room 1003 at approximately 0000 via cart from emergency room. Patient was accompanied by nurse.     Verbal SBAR report received from Nini prior to patient arrival.     Patient trasferred to bed via air allyssa. Patient alert and oriented X 1. Pain is controlled with current analgesics.  Medication(s) being used: acetaminophen.  . Admission vital signs: Blood pressure 94/42, pulse 54, temperature 98  F (36.7  C), temperature source Oral, resp. rate 19, height 1.803 m (5' 11\"), weight 82.5 kg (181 lb 14.1 oz), SpO2 97 %. Patient was oriented to plan of care, call light, bed controls, tv, telephone, bathroom and visiting hours.     Risk Assessment    The following safety risks were identified during admission: fall and isolation. Yellow risk band applied: YES.     Skin Initial Assessment    This writer admitted this patient and completed a full skin assessment and Dg score in the Adult PCS flowsheet. Appropriate interventions initiated as needed.     Secondary skin check completed by Kellee ALVES.    Dg Risk Assessment  Sensory Perception: 3-->slightly limited  Moisture: 3-->occasionally moist  Activity: 3-->walks occasionally  Mobility: 3-->slightly limited  Nutrition: 3-->adequate  Friction and Shear: 2-->potential problem  Dg Score: 17  Moisture Interventions: Incontinence pad  Friction/Shear Interventions: HOB 30 degrees or less  Mattress: Low air loss (MIGNON pump, Isolibrium, Skin Guard, Pulsate, Isoair, etc.)  Bed Frame: Standard width and length    Education    Patient has a [place] to Observation order: No  Observation education completed and documented: N/A      TITO ECKERT RN     "

## 2023-03-20 NOTE — CONSULTS
Care Management Initial Consult    General Information  Assessment completed with: Spouse Sonya, Care Team Member Sophia ALVES, [facility]  Type of CM/SW Visit: Offer D/C Planning    Primary Care Provider verified and updated as needed: Yes   Readmission within the last 30 days: no previous admission in last 30 days         Advance Care Planning: Advance Care Planning Reviewed: no concerns identified          Communication Assessment  Patient's communication style: spoken language (English or Bilingual)    Hearing Difficulty or Deaf: no   Wear Glasses or Blind: yes    Cognitive  Cognitive/Neuro/Behavioral: .WDL except  Level of Consciousness: alert, confused  Arousal Level: opens eyes spontaneously  Orientation: disoriented to, time  Mood/Behavior: calm     Speech: clear    Living Environment:   People in home: spouse     Current living Arrangements: assisted living, apartment  Name of Facility: [facility]   Able to return to prior arrangements: yes       Family/Social Support:  Care provided by: self  Provides care for: no one  Marital Status:   Wife, Children, Facility resident(s)/Staff  Sonya       Description of Support System: Supportive, Involved    Support Assessment: Adequate family and caregiver support    Current Resources:   Patient receiving home care services: No  Community Resources: None  Equipment currently used at home: grab bar, toilet, grab bar, tub/shower, shower chair, walker, rolling  Supplies currently used at home: None    Employment/Financial:  Employment Status: retired        Financial Concerns: No concerns identified           Lifestyle & Psychosocial Needs:  Social Determinants of Health     Tobacco Use: Medium Risk     Smoking Tobacco Use: Former     Smokeless Tobacco Use: Never     Passive Exposure: Not on file   Alcohol Use: Not on file   Financial Resource Strain: Not on file   Food Insecurity: Not on file   Transportation Needs: Not on file   Physical Activity: Not on file    Stress: Not on file   Social Connections: Not on file   Intimate Partner Violence: Not on file   Depression: Not at risk     PHQ-2 Score: 0   Housing Stability: Not on file       Functional Status:  Prior to admission patient needed assistance:   Dependent ADLs:: Independent  Dependent IADLs:: Cleaning, Cooking, Laundry, Shopping, Meal Preparation, Medication Management, Transportation       Mental Health Status:  Mental Health Status: No Current Concerns       Chemical Dependency Status:  Chemical Dependency Status: No Current Concerns             Values/Beliefs:  Spiritual, Cultural Beliefs, Bahai Practices, Values that affect care: no               Additional Information:    Spoke with wife Sonya and Sophia ALVES, [facility] as the Pt is confused regarding discharge planning.    Patient lives with wife Sonya at [facility] (Phone: [phone] Fax:[phone]).  Per wife, Sonya at baseline Pt is alert/oriented, forgetful.  Per Sophia ALVES he receives assist with medication administration.  He is independent with ADLs.  He ambulates with a walker.  His children are available to assist if needed.    Plan:  Return to [facility].      Michelle Jain, RN

## 2023-03-21 LAB
ALBUMIN SERPL BCG-MCNC: 2.5 G/DL (ref 3.5–5.2)
ALP SERPL-CCNC: 72 U/L (ref 40–129)
ALT SERPL W P-5'-P-CCNC: 17 U/L (ref 10–50)
ANION GAP SERPL CALCULATED.3IONS-SCNC: 3 MMOL/L (ref 7–15)
AST SERPL W P-5'-P-CCNC: 27 U/L (ref 10–50)
BACTERIA UR CULT: NO GROWTH
BASOPHILS # BLD AUTO: 0 10E3/UL (ref 0–0.2)
BASOPHILS NFR BLD AUTO: 0 %
BILIRUB SERPL-MCNC: 0.4 MG/DL
BUN SERPL-MCNC: 14.2 MG/DL (ref 8–23)
C COLI+JEJUNI+LARI FUSA STL QL NAA+PROBE: NOT DETECTED
C PARVUM AG STL QL IA: NEGATIVE
CALCIUM SERPL-MCNC: 8.9 MG/DL (ref 8.8–10.2)
CHLORIDE SERPL-SCNC: 107 MMOL/L (ref 98–107)
CREAT SERPL-MCNC: 0.69 MG/DL (ref 0.67–1.17)
CRP SERPL-MCNC: 106.41 MG/L
DEPRECATED HCO3 PLAS-SCNC: 28 MMOL/L (ref 22–29)
EC STX1 GENE STL QL NAA+PROBE: NOT DETECTED
EC STX2 GENE STL QL NAA+PROBE: NOT DETECTED
EOSINOPHIL # BLD AUTO: 0.1 10E3/UL (ref 0–0.7)
EOSINOPHIL NFR BLD AUTO: 1 %
ERYTHROCYTE [DISTWIDTH] IN BLOOD BY AUTOMATED COUNT: 16 % (ref 10–15)
G LAMBLIA AG STL QL IA: NEGATIVE
GFR SERPL CREATININE-BSD FRML MDRD: >90 ML/MIN/1.73M2
GLUCOSE SERPL-MCNC: 94 MG/DL (ref 70–99)
HCT VFR BLD AUTO: 32.2 % (ref 40–53)
HGB BLD-MCNC: 10.2 G/DL (ref 13.3–17.7)
IMM GRANULOCYTES # BLD: 0.1 10E3/UL
IMM GRANULOCYTES NFR BLD: 1 %
LYMPHOCYTES # BLD AUTO: 0.9 10E3/UL (ref 0.8–5.3)
LYMPHOCYTES NFR BLD AUTO: 8 %
MAGNESIUM SERPL-MCNC: 1.6 MG/DL (ref 1.7–2.3)
MCH RBC QN AUTO: 31.4 PG (ref 26.5–33)
MCHC RBC AUTO-ENTMCNC: 31.7 G/DL (ref 31.5–36.5)
MCV RBC AUTO: 99 FL (ref 78–100)
MONOCYTES # BLD AUTO: 0.7 10E3/UL (ref 0–1.3)
MONOCYTES NFR BLD AUTO: 7 %
NEUTROPHILS # BLD AUTO: 8.9 10E3/UL (ref 1.6–8.3)
NEUTROPHILS NFR BLD AUTO: 83 %
NOROV GI+II ORF1-ORF2 JNC STL QL NAA+PR: DETECTED
NRBC # BLD AUTO: 0 10E3/UL
NRBC BLD AUTO-RTO: 0 /100
O+P STL MICRO: NEGATIVE
PLATELET # BLD AUTO: 138 10E3/UL (ref 150–450)
POTASSIUM SERPL-SCNC: 4 MMOL/L (ref 3.4–5.3)
PROT SERPL-MCNC: 4.9 G/DL (ref 6.4–8.3)
RBC # BLD AUTO: 3.25 10E6/UL (ref 4.4–5.9)
RVA NSP5 STL QL NAA+PROBE: NOT DETECTED
SALMONELLA SP RPOD STL QL NAA+PROBE: NOT DETECTED
SHIGELLA SP+EIEC IPAH STL QL NAA+PROBE: NOT DETECTED
SODIUM SERPL-SCNC: 138 MMOL/L (ref 136–145)
V CHOL+PARA RFBL+TRKH+TNAA STL QL NAA+PR: NOT DETECTED
WBC # BLD AUTO: 10.7 10E3/UL (ref 4–11)
Y ENTERO RECN STL QL NAA+PROBE: NOT DETECTED

## 2023-03-21 PROCEDURE — 86140 C-REACTIVE PROTEIN: CPT | Performed by: INTERNAL MEDICINE

## 2023-03-21 PROCEDURE — 258N000003 HC RX IP 258 OP 636: Performed by: INTERNAL MEDICINE

## 2023-03-21 PROCEDURE — 120N000004 HC R&B MS OVERFLOW

## 2023-03-21 PROCEDURE — 250N000011 HC RX IP 250 OP 636: Performed by: INTERNAL MEDICINE

## 2023-03-21 PROCEDURE — 250N000013 HC RX MED GY IP 250 OP 250 PS 637: Performed by: INTERNAL MEDICINE

## 2023-03-21 PROCEDURE — 99232 SBSQ HOSP IP/OBS MODERATE 35: CPT | Performed by: INTERNAL MEDICINE

## 2023-03-21 PROCEDURE — C9113 INJ PANTOPRAZOLE SODIUM, VIA: HCPCS | Performed by: INTERNAL MEDICINE

## 2023-03-21 PROCEDURE — 80053 COMPREHEN METABOLIC PANEL: CPT | Performed by: INTERNAL MEDICINE

## 2023-03-21 PROCEDURE — 83735 ASSAY OF MAGNESIUM: CPT | Performed by: INTERNAL MEDICINE

## 2023-03-21 PROCEDURE — 85025 COMPLETE CBC W/AUTO DIFF WBC: CPT | Performed by: INTERNAL MEDICINE

## 2023-03-21 RX ORDER — ONDANSETRON 4 MG/1
4-8 TABLET, ORALLY DISINTEGRATING ORAL EVERY 6 HOURS PRN
Status: DISCONTINUED | OUTPATIENT
Start: 2023-03-21 | End: 2023-03-31 | Stop reason: HOSPADM

## 2023-03-21 RX ORDER — PROCHLORPERAZINE 25 MG
12.5 SUPPOSITORY, RECTAL RECTAL EVERY 12 HOURS PRN
Status: DISCONTINUED | OUTPATIENT
Start: 2023-03-21 | End: 2023-03-31 | Stop reason: HOSPADM

## 2023-03-21 RX ORDER — ONDANSETRON 2 MG/ML
4-8 INJECTION INTRAMUSCULAR; INTRAVENOUS EVERY 6 HOURS PRN
Status: DISCONTINUED | OUTPATIENT
Start: 2023-03-21 | End: 2023-03-31 | Stop reason: HOSPADM

## 2023-03-21 RX ORDER — DIPHENHYDRAMINE HCL 25 MG
25 CAPSULE ORAL EVERY 6 HOURS PRN
Status: DISCONTINUED | OUTPATIENT
Start: 2023-03-21 | End: 2023-03-31 | Stop reason: HOSPADM

## 2023-03-21 RX ORDER — DIPHENHYDRAMINE HYDROCHLORIDE 50 MG/ML
25 INJECTION INTRAMUSCULAR; INTRAVENOUS EVERY 6 HOURS PRN
Status: DISCONTINUED | OUTPATIENT
Start: 2023-03-21 | End: 2023-03-31 | Stop reason: HOSPADM

## 2023-03-21 RX ORDER — PROCHLORPERAZINE MALEATE 5 MG
5 TABLET ORAL EVERY 6 HOURS PRN
Status: DISCONTINUED | OUTPATIENT
Start: 2023-03-21 | End: 2023-03-31 | Stop reason: HOSPADM

## 2023-03-21 RX ADMIN — ACETAMINOPHEN 1000 MG: 500 TABLET ORAL at 01:27

## 2023-03-21 RX ADMIN — DIPHENHYDRAMINE HYDROCHLORIDE 25 MG: 50 INJECTION, SOLUTION INTRAMUSCULAR; INTRAVENOUS at 08:57

## 2023-03-21 RX ADMIN — PANTOPRAZOLE SODIUM 40 MG: 40 INJECTION, POWDER, FOR SOLUTION INTRAVENOUS at 14:24

## 2023-03-21 RX ADMIN — CEFEPIME 2 G: 2 INJECTION, POWDER, FOR SOLUTION INTRAVENOUS at 01:08

## 2023-03-21 RX ADMIN — VANCOMYCIN HYDROCHLORIDE 750 MG: 5 INJECTION, POWDER, LYOPHILIZED, FOR SOLUTION INTRAVENOUS at 10:38

## 2023-03-21 RX ADMIN — TRAZODONE HYDROCHLORIDE 100 MG: 100 TABLET ORAL at 23:49

## 2023-03-21 RX ADMIN — VANCOMYCIN HYDROCHLORIDE 750 MG: 5 INJECTION, POWDER, LYOPHILIZED, FOR SOLUTION INTRAVENOUS at 23:54

## 2023-03-21 RX ADMIN — ATORVASTATIN CALCIUM 60 MG: 20 TABLET, FILM COATED ORAL at 17:12

## 2023-03-21 RX ADMIN — METRONIDAZOLE 500 MG: 500 INJECTION, SOLUTION INTRAVENOUS at 10:38

## 2023-03-21 RX ADMIN — PROCHLORPERAZINE EDISYLATE 5 MG: 5 INJECTION, SOLUTION INTRAMUSCULAR; INTRAVENOUS at 14:13

## 2023-03-21 RX ADMIN — DONEPEZIL HYDROCHLORIDE 10 MG: 10 TABLET ORAL at 23:49

## 2023-03-21 RX ADMIN — BUSPIRONE HYDROCHLORIDE 15 MG: 15 TABLET ORAL at 23:49

## 2023-03-21 RX ADMIN — ONDANSETRON 4 MG: 2 INJECTION INTRAMUSCULAR; INTRAVENOUS at 07:56

## 2023-03-21 RX ADMIN — CEFEPIME 2 G: 2 INJECTION, POWDER, FOR SOLUTION INTRAVENOUS at 08:57

## 2023-03-21 RX ADMIN — SODIUM CHLORIDE, POTASSIUM CHLORIDE, SODIUM LACTATE AND CALCIUM CHLORIDE: 600; 310; 30; 20 INJECTION, SOLUTION INTRAVENOUS at 05:26

## 2023-03-21 RX ADMIN — ACETAMINOPHEN 1000 MG: 500 TABLET ORAL at 23:49

## 2023-03-21 RX ADMIN — METRONIDAZOLE 500 MG: 500 INJECTION, SOLUTION INTRAVENOUS at 17:20

## 2023-03-21 RX ADMIN — BUSPIRONE HYDROCHLORIDE 15 MG: 15 TABLET ORAL at 17:12

## 2023-03-21 RX ADMIN — CEFEPIME 2 G: 2 INJECTION, POWDER, FOR SOLUTION INTRAVENOUS at 17:10

## 2023-03-21 RX ADMIN — OXYCODONE HYDROCHLORIDE 5 MG: 5 TABLET ORAL at 23:49

## 2023-03-21 RX ADMIN — METRONIDAZOLE 500 MG: 500 INJECTION, SOLUTION INTRAVENOUS at 01:09

## 2023-03-21 ASSESSMENT — ACTIVITIES OF DAILY LIVING (ADL)
ADLS_ACUITY_SCORE: 37
ADLS_ACUITY_SCORE: 35
ADLS_ACUITY_SCORE: 35
ADLS_ACUITY_SCORE: 37
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
## 2023-03-21 NOTE — PROGRESS NOTES
"End Of Shift Note    Plan: monitor for continence of stool and decreased nausea     Subjective/Objective:    Neuro: A/Ox3, afebrile    Cardiac: SR no edema    Resp: RA    GI/: Torres cath, incontinent of stool     MSK: +2 assist    Skin: no new concerns at this time    LDAs: Central line infusing or TKO, PIV SL    Pt fell today as he was trying to get up on his own from the chair.  Pt stated that he had to use the bathroom and tried to do it himself.  Pt slid from chair to the floor where he was found.   Pt denies any head, neck or new pain from the fall stating \"the only thing that hurts is my pride.\"  Re-educated the importance of using the call light, the physician was paged and no new orders were signed.  Pt is still A/Ox3 and no pain over 1h past fall.       "

## 2023-03-21 NOTE — PROGRESS NOTES
Pt had one more episode of diarrhea while in bed but soft solid stool on BSC. Pt very forgetful and asks same question multiple times but easily reoriented. Afebrile and all VSS on RA. Torres in place with clear urine out. CVC internal jugular intact w/ LR @ 75mL/hr. C/O chronic pain and received PRN oxy and tylenol x 1 each. No distress noted.

## 2023-03-22 ENCOUNTER — APPOINTMENT (OUTPATIENT)
Dept: OCCUPATIONAL THERAPY | Facility: CLINIC | Age: 83
DRG: 871 | End: 2023-03-22
Payer: MEDICARE

## 2023-03-22 ENCOUNTER — APPOINTMENT (OUTPATIENT)
Dept: PHYSICAL THERAPY | Facility: CLINIC | Age: 83
DRG: 871 | End: 2023-03-22
Payer: MEDICARE

## 2023-03-22 PROBLEM — R78.81 BACTEREMIA: Status: ACTIVE | Noted: 2023-03-20

## 2023-03-22 PROBLEM — R78.81 BACTEREMIA: Status: ACTIVE | Noted: 2023-03-22

## 2023-03-22 LAB
ALBUMIN SERPL BCG-MCNC: 2.6 G/DL (ref 3.5–5.2)
ALP SERPL-CCNC: 84 U/L (ref 40–129)
ALT SERPL W P-5'-P-CCNC: 17 U/L (ref 10–50)
ANION GAP SERPL CALCULATED.3IONS-SCNC: 2 MMOL/L (ref 7–15)
AST SERPL W P-5'-P-CCNC: 30 U/L (ref 10–50)
BASOPHILS # BLD AUTO: 0 10E3/UL (ref 0–0.2)
BASOPHILS NFR BLD AUTO: 0 %
BILIRUB SERPL-MCNC: 0.3 MG/DL
BUN SERPL-MCNC: 10.1 MG/DL (ref 8–23)
CA-I BLD-MCNC: 5.1 MG/DL (ref 4.4–5.2)
CALCIUM SERPL-MCNC: 8.9 MG/DL (ref 8.8–10.2)
CHLORIDE SERPL-SCNC: 110 MMOL/L (ref 98–107)
CREAT SERPL-MCNC: 0.7 MG/DL (ref 0.67–1.17)
DEPRECATED HCO3 PLAS-SCNC: 30 MMOL/L (ref 22–29)
EOSINOPHIL # BLD AUTO: 0.1 10E3/UL (ref 0–0.7)
EOSINOPHIL NFR BLD AUTO: 1 %
ERYTHROCYTE [DISTWIDTH] IN BLOOD BY AUTOMATED COUNT: 16.2 % (ref 10–15)
GFR SERPL CREATININE-BSD FRML MDRD: >90 ML/MIN/1.73M2
GLUCOSE SERPL-MCNC: 83 MG/DL (ref 70–99)
HCT VFR BLD AUTO: 34.8 % (ref 40–53)
HGB BLD-MCNC: 10.8 G/DL (ref 13.3–17.7)
IMM GRANULOCYTES # BLD: 0.1 10E3/UL
IMM GRANULOCYTES NFR BLD: 1 %
LYMPHOCYTES # BLD AUTO: 1.1 10E3/UL (ref 0.8–5.3)
LYMPHOCYTES NFR BLD AUTO: 15 %
MAGNESIUM SERPL-MCNC: 1.8 MG/DL (ref 1.7–2.3)
MCH RBC QN AUTO: 30.9 PG (ref 26.5–33)
MCHC RBC AUTO-ENTMCNC: 31 G/DL (ref 31.5–36.5)
MCV RBC AUTO: 100 FL (ref 78–100)
MONOCYTES # BLD AUTO: 0.6 10E3/UL (ref 0–1.3)
MONOCYTES NFR BLD AUTO: 8 %
NEUTROPHILS # BLD AUTO: 5.6 10E3/UL (ref 1.6–8.3)
NEUTROPHILS NFR BLD AUTO: 75 %
NRBC # BLD AUTO: 0 10E3/UL
NRBC BLD AUTO-RTO: 0 /100
PLATELET # BLD AUTO: 144 10E3/UL (ref 150–450)
POTASSIUM SERPL-SCNC: 3.9 MMOL/L (ref 3.4–5.3)
PROT SERPL-MCNC: 5.1 G/DL (ref 6.4–8.3)
RBC # BLD AUTO: 3.49 10E6/UL (ref 4.4–5.9)
SODIUM SERPL-SCNC: 142 MMOL/L (ref 136–145)
VANCOMYCIN SERPL-MCNC: 7.9 UG/ML
WBC # BLD AUTO: 7.5 10E3/UL (ref 4–11)

## 2023-03-22 PROCEDURE — 250N000013 HC RX MED GY IP 250 OP 250 PS 637: Performed by: INTERNAL MEDICINE

## 2023-03-22 PROCEDURE — 250N000011 HC RX IP 250 OP 636: Performed by: INTERNAL MEDICINE

## 2023-03-22 PROCEDURE — 120N000004 HC R&B MS OVERFLOW

## 2023-03-22 PROCEDURE — 80202 ASSAY OF VANCOMYCIN: CPT | Performed by: INTERNAL MEDICINE

## 2023-03-22 PROCEDURE — 85004 AUTOMATED DIFF WBC COUNT: CPT | Performed by: INTERNAL MEDICINE

## 2023-03-22 PROCEDURE — 99232 SBSQ HOSP IP/OBS MODERATE 35: CPT | Performed by: INTERNAL MEDICINE

## 2023-03-22 PROCEDURE — 97166 OT EVAL MOD COMPLEX 45 MIN: CPT | Mod: GO

## 2023-03-22 PROCEDURE — 97161 PT EVAL LOW COMPLEX 20 MIN: CPT | Mod: GP

## 2023-03-22 PROCEDURE — C9113 INJ PANTOPRAZOLE SODIUM, VIA: HCPCS | Performed by: INTERNAL MEDICINE

## 2023-03-22 PROCEDURE — 83735 ASSAY OF MAGNESIUM: CPT | Performed by: INTERNAL MEDICINE

## 2023-03-22 PROCEDURE — 80053 COMPREHEN METABOLIC PANEL: CPT | Performed by: INTERNAL MEDICINE

## 2023-03-22 PROCEDURE — 258N000003 HC RX IP 258 OP 636: Performed by: INTERNAL MEDICINE

## 2023-03-22 PROCEDURE — 82330 ASSAY OF CALCIUM: CPT | Performed by: INTERNAL MEDICINE

## 2023-03-22 RX ORDER — VANCOMYCIN HYDROCHLORIDE 1 G/200ML
1000 INJECTION, SOLUTION INTRAVENOUS EVERY 12 HOURS
Status: DISCONTINUED | OUTPATIENT
Start: 2023-03-22 | End: 2023-03-22

## 2023-03-22 RX ADMIN — METRONIDAZOLE 500 MG: 500 INJECTION, SOLUTION INTRAVENOUS at 17:34

## 2023-03-22 RX ADMIN — PANTOPRAZOLE SODIUM 40 MG: 40 INJECTION, POWDER, FOR SOLUTION INTRAVENOUS at 09:05

## 2023-03-22 RX ADMIN — LEVOTHYROXINE SODIUM 50 MCG: 50 TABLET ORAL at 09:06

## 2023-03-22 RX ADMIN — BUSPIRONE HYDROCHLORIDE 15 MG: 15 TABLET ORAL at 17:16

## 2023-03-22 RX ADMIN — METRONIDAZOLE 500 MG: 500 INJECTION, SOLUTION INTRAVENOUS at 10:21

## 2023-03-22 RX ADMIN — CEFEPIME 2 G: 2 INJECTION, POWDER, FOR SOLUTION INTRAVENOUS at 17:14

## 2023-03-22 RX ADMIN — CEFEPIME 2 G: 2 INJECTION, POWDER, FOR SOLUTION INTRAVENOUS at 09:05

## 2023-03-22 RX ADMIN — METRONIDAZOLE 500 MG: 500 INJECTION, SOLUTION INTRAVENOUS at 01:23

## 2023-03-22 RX ADMIN — CLOPIDOGREL BISULFATE 75 MG: 75 TABLET ORAL at 09:05

## 2023-03-22 RX ADMIN — ATORVASTATIN CALCIUM 60 MG: 20 TABLET, FILM COATED ORAL at 17:16

## 2023-03-22 RX ADMIN — BUSPIRONE HYDROCHLORIDE 15 MG: 15 TABLET ORAL at 23:35

## 2023-03-22 RX ADMIN — ASPIRIN 81 MG CHEWABLE TABLET 81 MG: 81 TABLET CHEWABLE at 09:05

## 2023-03-22 RX ADMIN — BUSPIRONE HYDROCHLORIDE 15 MG: 15 TABLET ORAL at 09:05

## 2023-03-22 RX ADMIN — SODIUM CHLORIDE, POTASSIUM CHLORIDE, SODIUM LACTATE AND CALCIUM CHLORIDE: 600; 310; 30; 20 INJECTION, SOLUTION INTRAVENOUS at 22:04

## 2023-03-22 RX ADMIN — OXYCODONE HYDROCHLORIDE 5 MG: 5 TABLET ORAL at 22:07

## 2023-03-22 RX ADMIN — VANCOMYCIN HYDROCHLORIDE 1000 MG: 1 INJECTION, SOLUTION INTRAVENOUS at 11:15

## 2023-03-22 RX ADMIN — DONEPEZIL HYDROCHLORIDE 10 MG: 10 TABLET ORAL at 22:00

## 2023-03-22 RX ADMIN — SERTRALINE HYDROCHLORIDE 150 MG: 100 TABLET ORAL at 09:05

## 2023-03-22 RX ADMIN — CEFEPIME 2 G: 2 INJECTION, POWDER, FOR SOLUTION INTRAVENOUS at 01:23

## 2023-03-22 RX ADMIN — FENTANYL 1 PATCH: 12 PATCH, EXTENDED RELEASE TRANSDERMAL at 10:24

## 2023-03-22 RX ADMIN — TRAZODONE HYDROCHLORIDE 100 MG: 100 TABLET ORAL at 22:00

## 2023-03-22 ASSESSMENT — ACTIVITIES OF DAILY LIVING (ADL)
ADLS_ACUITY_SCORE: 37
ADLS_ACUITY_SCORE: 41
ADLS_ACUITY_SCORE: 37
ADLS_ACUITY_SCORE: 41
ADLS_ACUITY_SCORE: 37
ADLS_ACUITY_SCORE: 41
ADLS_ACUITY_SCORE: 37
ADLS_ACUITY_SCORE: 41
ADLS_ACUITY_SCORE: 41
ADLS_ACUITY_SCORE: 37
ADLS_ACUITY_SCORE: 41
ADLS_ACUITY_SCORE: 37

## 2023-03-22 NOTE — PROGRESS NOTES
Care Management Follow Up    Length of Stay (days): 3    Expected Discharge Date: 3/25/23   Concerns to be Addressed: discharge planning     Patient plan of care discussed at interdisciplinary rounds: Yes   Anticipated Discharge Disposition:  TBD  Patient/family educated on Medicare website which has current facility and service quality ratings: yes  Education Provided on the Discharge Plan: yes   Patient/Family in Agreement with the Plan:  N/A  Referrals Placed by CM/SW: Internal Clinic Care Coordination, Post Acute Facilities  Private pay costs discussed: Not applicable    Additional Information:    Spoke with wife Sonya and son Santo regarding discharge planning.    Patient lives with wife Sonya at [facility] (Phone: [phone] Fax:[phone]).   At baseline he is independent with ADLs.  He receives assistance with medication administration.    Discussed TCU due to weakness.  TCU referrals pending.    TCU referrals:  Service Provider Request Status Selected Services Address Phone Fax Patient Preferred   [facility] [place] - REFERRAL ONLY (SNF)  Pending - Request Sent N/A [id number] [facility] [id number] [phone] --   [facility](TCU)  Pending - Request Sent N/A [address] [facility] [id number] [phone] --   [facility] ()  Pending - Request Sent N/A [address] [id number] [phone] --     Plan:  TCU vs Return to [facility].      Michelle Jain RN
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
## 2023-03-22 NOTE — PROGRESS NOTES
AOX3, up w/assist of 2, walker and gait belt. Incontinent of stool majority of time, knows when needs to go but does not make it to bathroom in time. LR at 75ml/hr, poor PO intake, lópez draining QS. PRN tylenol x 1, oxycodone 5mg x 1 for c/o RLE pain, increased with movement. internal jugular infusing TKO, AM labs drawn from site. Rested comfortably throughout shift.

## 2023-03-22 NOTE — PROGRESS NOTES
Occupational Therapy     03/22/23 4716   Appointment Info   Signing Clinician's Name / Credentials (OT) Cindy Widmary, OTR/L   Living Environment   People in Home spouse   Current Living Arrangements assisted living   Home Accessibility no concerns   Self-Care   Equipment Currently Used at Home grab bar, toilet;grab bar, tub/shower;shower chair;walker, rolling   Fall history within last six months no   Activity/Exercise/Self-Care Comment Pt reports independence with ADLs   Instrumental Activities of Daily Living (IADL)   IADL Comments Pt reports ROMARIO assists with IADLs as needed   General Information   Onset of Illness/Injury or Date of Surgery 03/19/23   Referring Physician Manuel Oliva MD   Patient/Family Therapy Goal Statement (OT) To return home   Additional Occupational Profile Info/Pertinent History of Current Problem Sina Gomez is a 82 year old male with past medical history of hypothyroidism, KALIA, coronary artery bypass, TAVR, depression who presents on 3/19/2023 with rigors, fever, nausea, vomiting.   Cognitive Status Examination   Orientation Status orientation to person, place and time   Cognitive Status Comments Pt follows 1-2 step directions and follows conversation   Range of Motion Comprehensive   General Range of Motion no range of motion deficits identified   Strength Comprehensive (MMT)   General Manual Muscle Testing (MMT) Assessment no strength deficits identified   Bed Mobility   Bed Mobility supine-sit   Supine-Sit [place] (Bed Mobility) moderate assist (50% patient effort)  (assist x2)   Comment (Bed Mobility) Pt supine in bed upon arrival, agreeable to OT/PT cotx. Pt completes supine<>sit HOB slightly elevated ModA x2 to swing legs over EOB and support back. Pt sits EOB supporting self with hands posteriorly.   Transfers   Transfers sit-stand transfer   Sit-Stand Transfer   Sit-Stand [place] (Transfers) minimum assist (75% patient effort)   Sit/Stand Transfer Comments Pt  completes STS from EOB to FWW Jules x2 reporting increased dizziness upon standing. Pt stands in place x1 min CGA and completes L side-step x2 prior to sitting EOB CGA. Pt sompletes sit<>supine SBA. Pt left supine in bed with call light and needs in reach.   Clinical Impression   Criteria for Skilled Therapeutic Interventions Met (OT) Yes, treatment indicated   OT Diagnosis Decreased ADL independence   Influenced by the following impairments Colitis, norovirus   OT Problem List-Impairments impacting ADL problems related to;activity tolerance impaired;mobility;strength;balance   Assessment of Occupational Performance 3-5 Performance Deficits   Identified Performance Deficits transfers, toileting, bathing, dressing, functional mobility   Planned Therapy Interventions (OT) ADL retraining;transfer training   Clinical Decision Making Complexity (OT) moderate complexity   Risk & Benefits of therapy have been explained evaluation/treatment results reviewed;care plan/treatment goals reviewed;risks/benefits reviewed   OT Total Evaluation Time   OT Eval, Moderate Complexity Minutes (81709) 10   OT Goals   Therapy Frequency (OT) 5 times/wk   OT Predicted Duration/Target Date for Goal Attainment 03/29/23   OT Goals Transfers;Toilet Transfer/Toileting;Lower Body Dressing   OT: Lower Body Dressing Minimal assist   OT: Transfer Minimal assist   OT: Toilet Transfer/Toileting Minimal assist;cleaning and garment management   OT Discharge Planning   OT Plan POC Wed 1/5: transfers, toileting, LB dressing   OT Discharge Recommendation (DC Rec) home with home care occupational therapy;home with assist   OT Rationale for DC Rec Pt previously independent with ADLs requiring assist x2 for bed mobility today. Recommend return home with home OT to increase activity tolerance for ADLs. If pt progresses to assist x1 during his stay recommend home with assist.   OT Brief overview of current status ModA x2 bed mobility, Jules x2 STS   Total Session  Time   Total Session Time (sum of timed and untimed services) 10

## 2023-03-22 NOTE — PROGRESS NOTES
"   03/22/23 6936   Appointment Info   Signing Clinician's Name / Credentials (PT) Liz Fariba, PT   Living Environment   People in Home spouse   Current Living Arrangements assisted living   Home Accessibility no concerns   Self-Care   Equipment Currently Used at Home grab bar, toilet;grab bar, tub/shower;shower chair;walker, rolling  (\"Up\" walker)   Fall history within last six months no   Activity/Exercise/Self-Care Comment Pt typically indep with mobility with UP walker and ADL's. MCFP assists with cleaning, meals, meds.   General Information   Onset of Illness/Injury or Date of Surgery 03/19/23   Referring Physician Manuel Oliva MD   Patient/Family Therapy Goals Statement (PT) Pt hoping to return home   Pertinent History of Current Problem (include personal factors and/or comorbidities that impact the POC) Sina Gomez is a 82 year old male who presents on 3/19/2023 with confusion, rigors and fever.  Found to have pancolitis and biliary ductal dilation.  Hypotensive in emergency department requiring IJ placement and Levophed infusion initiation.   Existing Precautions/Restrictions oxygen therapy device and L/min;fall   Pain Assessment   Patient Currently in Pain No   Range of Motion (ROM)   Range of Motion ROM is WFL   Strength (Manual Muscle Testing)   Strength (Manual Muscle Testing) Deficits observed during functional mobility   Strength Comments Weakness present as pt needs assist for bed mobility and transfers   Bed Mobility   Comment, (Bed Mobility) supine>sit with modA for trunk, pt states he sleeps in a bed but also later states he has a lift recliner   Transfers   Comment, (Transfers) sit>stand from EOB with min-modA x2 and 2WW, cues for standing tall and bringing hips forward   Gait/Stairs (Locomotion)   Comment, (Gait/Stairs) sidesteps along side of bed with CGA and 2WW, complaints of dizziness so did not assess forward ambulation   Balance   Balance other (describe)   Balance Comments Pt " requires use of walker for mobility   Clinical Impression   Criteria for Skilled Therapeutic Intervention Yes, treatment indicated   PT Diagnosis (PT) impaired functional mobility   Influenced by the following impairments LE weakness, reduced activity tolerance, impaired balance   Functional limitations due to impairments impaired bed mobility, transfers, gait   Clinical Presentation (PT Evaluation Complexity) Stable/Uncomplicated   Clinical Presentation Rationale clinical reasoning   Clinical Decision Making (Complexity) low complexity   Planned Therapy Interventions (PT) balance training;bed mobility training;gait training;home exercise program;patient/family education;strengthening;transfer training   Anticipated Equipment Needs at Discharge (PT)   (has UP walker, anticipate no needs outside of baseline)   Risk & Benefits of therapy have been explained evaluation/treatment results reviewed;care plan/treatment goals reviewed;risks/benefits reviewed;current/potential barriers reviewed;participants voiced agreement with care plan;participants included;patient   PT Total Evaluation Time   PT Angyal, Low Complexity Minutes (19302) 15   Physical Therapy Goals   PT Frequency 5x/week   PT Predicted Duration/Target Date for Goal Attainment 03/29/23   PT Goals Bed Mobility;Transfers;Gait   PT: Bed Mobility Minimal assist;Supine to/from sit   PT: Transfers Minimal assist;Bed to/from chair   PT: Gait Minimal assist;Standard walker;25 feet   PT Discharge Planning   PT Plan Wed 1/5; PT plan bed mobility, transfers, gait with walker if not feeling dizzy; pt's goal is return to long term   PT Discharge Recommendation (DC Rec) Transitional Care Facility;home with assist;home with home care physical therapy   PT Rationale for DC Rec rec TCU today as pt requires Ax2 for transfers and unable to ambulate due to dizziness; pending length of stay and clinical progression, pt may progress to home with home care if family able to accommodate Ax1  and pt demonstrates ability to ambulate household distances   PT Brief overview of current status sit>stand with min-modA x2 and walker, sidesteps along side of bed   Total Session Time   Total Session Time (sum of timed and untimed services) 15

## 2023-03-22 NOTE — PLAN OF CARE
End Of Shift Note    Subjective/Objective:    Neuro: patient alert, confused at times, oriented to person and place    Cardiac: apical pulse regular, denies chest pain, chest pressure or chest tightness    Resp: lung sounds clear bilaterally, denies pain with deep breathing    GI/: lópez catheter remains patent with clear yellow urine in tubing, had one incontinent loose stool                MSK: generalize weakness, up with assist of 2, gait belt and walker    Skin: scattered bruises    LDAs: right internal jugular patent

## 2023-03-22 NOTE — PHARMACY-VANCOMYCIN DOSING SERVICE
Pharmacy Vancomycin Note  Date of Service 2023  Patient's  1940   82 year old, male    Indication: Bacteremia  Day of Therapy: 3  Current vancomycin regimen:  750 mg IV q12h  Current vancomycin monitoring method: AUC  Current vancomycin therapeutic monitoring goal: 400-600 mg*h/L    InsightRX Prediction of Current Vancomycin Regimen  Loading dose: 1000 mg  Regimen: 750 mg IV every 12 hours.  Start time: 11:54 on 2023  Exposure target: AUC24 (range)400-600 mg/L.hr   AUC24,ss: 332 mg/L.hr  Probability of AUC24 > 400: 19 %  Ctrough,ss: 10.2 mg/L  Probability of Ctrough,ss > 20: 1 %  Probability of nephrotoxicity (Lodise BRAULIO ): 6 %      Current estimated CrCl = Estimated Creatinine Clearance: 98.8 mL/min (based on SCr of 0.7 mg/dL).    Creatinine for last 3 days  3/19/2023:  3:09 PM Creatinine 0.85 mg/dL  3/20/2023:  6:16 AM Creatinine 0.83 mg/dL  3/21/2023:  5:33 AM Creatinine 0.69 mg/dL  3/22/2023:  6:06 AM Creatinine 0.70 mg/dL    Recent Vancomycin Levels (past 3 days)  3/22/2023: 10:12 AM Vancomycin 7.9 ug/mL    Vancomycin IV Administrations (past 72 hours)                   vancomycin (VANCOCIN) 750 mg in 0.9% NaCl 250 mL intermittent infusion (mg) 750 mg Given 23 2354     750 mg Given  1038     750 mg Given 23 2130    vancomycin (VANCOCIN) 1000 mg in dextrose 5% 200 mL PREMIX (mg) 1,000 mg New Bag 23 1210                Nephrotoxins and other renal medications (From now, onward)    Start     Dose/Rate Route Frequency Ordered Stop    23 2230  vancomycin (VANCOCIN) 750 mg in 0.9% NaCl 250 mL intermittent infusion         750 mg  over 60 Minutes Intravenous EVERY 12 HOURS 23 1035      23 0800  [Held by provider]  furosemide (LASIX) tablet 20 mg        (Held by provider since Mon 3/20/2023 at 0100 by Jayda Lal PA-C.Hold Reason: Change in Vitals)   Note to Pharmacy: PTA Sig:TAKE 1 TABLET BY MOUTH DAILY      20 mg Oral DAILY 23 0027                Contrast Orders - past 72 hours (72h ago, onward)    Start     Dose/Rate Route Frequency Stop    03/20/23 1100  gadobutrol (GADAVIST) injection 9 mL         9 mL Intravenous ONCE      03/20/23 1100  gadobutrol (GADAVIST) injection 8 mL         8 mL Intravenous ONCE 03/20/23 1203    03/19/23 1535  iopamidol (ISOVUE-370) solution 90 mL         90 mL Intravenous ONCE 03/19/23 1713          Interpretation of levels and current regimen:  Vancomycin level is reflective of AUC less than 400    Has serum creatinine changed greater than 50% in last 72 hours: No    Urine output:  good urine output    Renal Function: Stable    InsightRX Prediction of Planned New Vancomycin Regimen  Regimen: 1000 mg IV every 12 hours.  Start time: 11:54 on 03/22/2023  Exposure target: AUC24 (range)400-600 mg/L.hr   AUC24,ss: 440 mg/L.hr  Probability of AUC24 > 400: 68 %  Ctrough,ss: 13.7 mg/L  Probability of Ctrough,ss > 20: 9 %  Probability of nephrotoxicity (Lodise BRAULIO 2009): 9 %      Plan:  1. Increase Dose to 1000 mg q12h  2. Vancomycin monitoring method: AUC  3. Vancomycin therapeutic monitoring goal: 400-600 mg*h/L  4. Pharmacy will check vancomycin levels as appropriate in 1-3 Days.  5. Serum creatinine levels will be ordered daily for the first week of therapy and at least twice weekly for subsequent weeks.    Nettie Balbuena [facility]

## 2023-03-23 ENCOUNTER — APPOINTMENT (OUTPATIENT)
Dept: OCCUPATIONAL THERAPY | Facility: CLINIC | Age: 83
DRG: 871 | End: 2023-03-23
Payer: MEDICARE

## 2023-03-23 ENCOUNTER — APPOINTMENT (OUTPATIENT)
Dept: PHYSICAL THERAPY | Facility: CLINIC | Age: 83
DRG: 871 | End: 2023-03-23
Payer: MEDICARE

## 2023-03-23 LAB
ALBUMIN SERPL BCG-MCNC: 2.6 G/DL (ref 3.5–5.2)
ALP SERPL-CCNC: 79 U/L (ref 40–129)
ALT SERPL W P-5'-P-CCNC: 16 U/L (ref 10–50)
ANION GAP SERPL CALCULATED.3IONS-SCNC: 3 MMOL/L (ref 7–15)
AST SERPL W P-5'-P-CCNC: 29 U/L (ref 10–50)
BACTERIA BLD CULT: ABNORMAL
BILIRUB SERPL-MCNC: 0.3 MG/DL
BUN SERPL-MCNC: 11.5 MG/DL (ref 8–23)
CALCIUM SERPL-MCNC: 8.8 MG/DL (ref 8.8–10.2)
CHLORIDE SERPL-SCNC: 110 MMOL/L (ref 98–107)
CREAT SERPL-MCNC: 0.71 MG/DL (ref 0.67–1.17)
CRP SERPL-MCNC: 40.88 MG/L
DEPRECATED HCO3 PLAS-SCNC: 28 MMOL/L (ref 22–29)
GFR SERPL CREATININE-BSD FRML MDRD: 90 ML/MIN/1.73M2
GLUCOSE BLDC GLUCOMTR-MCNC: 116 MG/DL (ref 70–99)
GLUCOSE SERPL-MCNC: 103 MG/DL (ref 70–99)
HIV 1+2 AB+HIV1P24 AG SERPLBLD IA.RAPID: NON REACTIVE
HIV 1+2 AB+HIV1P24 AG SERPLBLD IA.RAPID: NON REACTIVE
HIV INTERPRETATION: NORMAL
POTASSIUM SERPL-SCNC: 3.8 MMOL/L (ref 3.4–5.3)
PROT SERPL-MCNC: 5 G/DL (ref 6.4–8.3)
SODIUM SERPL-SCNC: 141 MMOL/L (ref 136–145)

## 2023-03-23 PROCEDURE — 250N000013 HC RX MED GY IP 250 OP 250 PS 637: Performed by: INTERNAL MEDICINE

## 2023-03-23 PROCEDURE — 97530 THERAPEUTIC ACTIVITIES: CPT | Mod: GP

## 2023-03-23 PROCEDURE — 250N000011 HC RX IP 250 OP 636: Performed by: INTERNAL MEDICINE

## 2023-03-23 PROCEDURE — C9113 INJ PANTOPRAZOLE SODIUM, VIA: HCPCS | Performed by: INTERNAL MEDICINE

## 2023-03-23 PROCEDURE — 86140 C-REACTIVE PROTEIN: CPT | Performed by: INTERNAL MEDICINE

## 2023-03-23 PROCEDURE — 999N000104 HIV RAPID ANTIBODY SCREEN: Performed by: INTERNAL MEDICINE

## 2023-03-23 PROCEDURE — 258N000003 HC RX IP 258 OP 636: Performed by: INTERNAL MEDICINE

## 2023-03-23 PROCEDURE — 120N000001 HC R&B MED SURG/OB

## 2023-03-23 PROCEDURE — 99232 SBSQ HOSP IP/OBS MODERATE 35: CPT | Performed by: INTERNAL MEDICINE

## 2023-03-23 PROCEDURE — 80053 COMPREHEN METABOLIC PANEL: CPT | Performed by: INTERNAL MEDICINE

## 2023-03-23 RX ORDER — LOPERAMIDE HCL 2 MG
2 CAPSULE ORAL 4 TIMES DAILY PRN
Status: DISCONTINUED | OUTPATIENT
Start: 2023-03-23 | End: 2023-03-31 | Stop reason: HOSPADM

## 2023-03-23 RX ADMIN — LOPERAMIDE HYDROCHLORIDE 2 MG: 2 CAPSULE ORAL at 10:21

## 2023-03-23 RX ADMIN — TRAZODONE HYDROCHLORIDE 100 MG: 100 TABLET ORAL at 22:29

## 2023-03-23 RX ADMIN — DONEPEZIL HYDROCHLORIDE 10 MG: 10 TABLET ORAL at 22:29

## 2023-03-23 RX ADMIN — ASPIRIN 81 MG CHEWABLE TABLET 81 MG: 81 TABLET CHEWABLE at 09:10

## 2023-03-23 RX ADMIN — ONDANSETRON 4 MG: 2 INJECTION INTRAMUSCULAR; INTRAVENOUS at 17:58

## 2023-03-23 RX ADMIN — CEFEPIME 2 G: 2 INJECTION, POWDER, FOR SOLUTION INTRAVENOUS at 09:06

## 2023-03-23 RX ADMIN — METRONIDAZOLE 500 MG: 500 INJECTION, SOLUTION INTRAVENOUS at 17:13

## 2023-03-23 RX ADMIN — CEFEPIME 2 G: 2 INJECTION, POWDER, FOR SOLUTION INTRAVENOUS at 01:39

## 2023-03-23 RX ADMIN — SODIUM CHLORIDE, POTASSIUM CHLORIDE, SODIUM LACTATE AND CALCIUM CHLORIDE: 600; 310; 30; 20 INJECTION, SOLUTION INTRAVENOUS at 22:37

## 2023-03-23 RX ADMIN — ACETAMINOPHEN 1000 MG: 500 TABLET ORAL at 20:47

## 2023-03-23 RX ADMIN — LOPERAMIDE HYDROCHLORIDE 2 MG: 2 CAPSULE ORAL at 20:47

## 2023-03-23 RX ADMIN — SODIUM CHLORIDE, POTASSIUM CHLORIDE, SODIUM LACTATE AND CALCIUM CHLORIDE: 600; 310; 30; 20 INJECTION, SOLUTION INTRAVENOUS at 08:52

## 2023-03-23 RX ADMIN — ATORVASTATIN CALCIUM 60 MG: 20 TABLET, FILM COATED ORAL at 16:23

## 2023-03-23 RX ADMIN — METRONIDAZOLE 500 MG: 500 INJECTION, SOLUTION INTRAVENOUS at 01:39

## 2023-03-23 RX ADMIN — CLOPIDOGREL BISULFATE 75 MG: 75 TABLET ORAL at 09:10

## 2023-03-23 RX ADMIN — PANTOPRAZOLE SODIUM 40 MG: 40 INJECTION, POWDER, FOR SOLUTION INTRAVENOUS at 08:58

## 2023-03-23 RX ADMIN — LEVOTHYROXINE SODIUM 50 MCG: 50 TABLET ORAL at 09:10

## 2023-03-23 RX ADMIN — BUSPIRONE HYDROCHLORIDE 15 MG: 15 TABLET ORAL at 16:24

## 2023-03-23 RX ADMIN — CEFEPIME 2 G: 2 INJECTION, POWDER, FOR SOLUTION INTRAVENOUS at 16:30

## 2023-03-23 RX ADMIN — BUSPIRONE HYDROCHLORIDE 15 MG: 15 TABLET ORAL at 09:10

## 2023-03-23 RX ADMIN — SERTRALINE HYDROCHLORIDE 150 MG: 100 TABLET ORAL at 09:10

## 2023-03-23 RX ADMIN — ACETAMINOPHEN 1000 MG: 500 TABLET ORAL at 02:06

## 2023-03-23 RX ADMIN — METRONIDAZOLE 500 MG: 500 INJECTION, SOLUTION INTRAVENOUS at 10:05

## 2023-03-23 ASSESSMENT — ACTIVITIES OF DAILY LIVING (ADL)
ADLS_ACUITY_SCORE: 41

## 2023-03-23 NOTE — PROGRESS NOTES
Awake and oriented to self and situation  Disoriented to time and place  Follows commands  Can track and participate in conversation  Pleasant and agreeable  Multiple loose incontinent stools  Red skin on buttocks, barrier cream applied  Poor appetite  Did sit up in chair for breakfast, immediately became incontinent of stool multiple times and did not eat any breakfast due to no appetite, went right back to bed.  Patient has been sleeping much of today.  Heavy assist of 2 with walker and gait belt  Positive for Norovirus  Enteric precautions maintained  CVC internal jugular line dressing changed today    At 606pm patient set off bed alarm sitting at edge of bed  Denied having to use bathroom  Stted he was dizzy  BG was checked 113  Vitals all stable except O2 was 87-90 on room air  Ondansetron given  Placed back on O2 1Ltr/min NC then sats remained at 92%  Patient was able to lie back down and go back to sleep      Hal ALVES [facility]

## 2023-03-23 NOTE — PROGRESS NOTES
Infectious Disease Curbside Note  I was called by Dr. Oliva on 3/22/2023 at 12/45 PM to provide input for Sina Gomez MRN [id number]. The patient is located at [facility]. The nature of this request for a curbside consultation does not permit me to perform a comprehensive review of health care records, patient/family interview, nor an examination of the patient. I obtained limited patient information from the provider on the phone call and a limited chart review.    Based on only the information I was provided today, I make the following recommendations to the treating provider/team for their review and consideration:   The polymicrobial infection is due to gut bacterial translocation secondary to norovirus colitis.   The norovirus colitis itself is self-limited.   No single regimen is going to cover gut carlos, hence, I would use ceftriaxone/flgayl for intraabdominal process coverage regardless of the resistance/susceptiblity pattern as long as the patient is clinically improving.   When ready for discharge may choose an oral cephalosporin and flagyl 500 mg tid PO to complete a total of 10-day course.      These recommendations are not intended to take the place of the care team's clinical judgement, which should always be utilized to provide the most appropriate care to meet the unique needs of each patient. The recommendations offered were based on the limited scope of information provided as today's date. Should additional guidance be needed or required a formal consultation with infectious diseases is recommended.    The documentation is on 3/23/2023 but the encounter was on 3/22/23.     *Primary team: If a patient is discharged on IV antibiotics it is not the responsibility of the ID curbside provider to monitor labs or sign for antibiotic orders unless it is otherwise stated by the curbside provider. If further outpatient management is required then place an outpatient ID consult and  they will be scheduled based on the urgency of appointment.

## 2023-03-23 NOTE — PROGRESS NOTES
"[place] [facility] [place]MISS[place]    Patient admitted to room 2303 at approximately 0600 via wheel chair from ICU. Patient was accompanied by nurse.     Verbal SBAR report received from Gaetano prior to patient arrival.     Patient ambulated to bed with two assist and a walker. Patient alert and oriented X 2. The patient is not having any pain.  . Admission vital signs: Blood pressure (!) 163/78, pulse 68, temperature 98.3  F (36.8  C), temperature source Oral, resp. rate 19, height 1.803 m (5' 11\"), weight 88.9 kg (195 lb 15.8 oz), SpO2 92 %. Patient was oriented to plan of care, call light, bed controls, tv, telephone, bathroom and visiting hours.     Risk Assessment    The following safety risks were identified during admission: fall. Yellow risk band applied: YES.       JON MAGANA RN    "
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
## 2023-03-24 ENCOUNTER — APPOINTMENT (OUTPATIENT)
Dept: PHYSICAL THERAPY | Facility: CLINIC | Age: 83
DRG: 871 | End: 2023-03-24
Payer: MEDICARE

## 2023-03-24 ENCOUNTER — APPOINTMENT (OUTPATIENT)
Dept: OCCUPATIONAL THERAPY | Facility: CLINIC | Age: 83
DRG: 871 | End: 2023-03-24
Payer: MEDICARE

## 2023-03-24 LAB
ANION GAP SERPL CALCULATED.3IONS-SCNC: 6 MMOL/L (ref 7–15)
BASOPHILS # BLD AUTO: 0 10E3/UL (ref 0–0.2)
BASOPHILS NFR BLD AUTO: 0 %
BUN SERPL-MCNC: 9.2 MG/DL (ref 8–23)
CALCIUM SERPL-MCNC: 8.7 MG/DL (ref 8.8–10.2)
CHLORIDE SERPL-SCNC: 110 MMOL/L (ref 98–107)
CREAT SERPL-MCNC: 0.67 MG/DL (ref 0.67–1.17)
DEPRECATED HCO3 PLAS-SCNC: 25 MMOL/L (ref 22–29)
EOSINOPHIL # BLD AUTO: 0.1 10E3/UL (ref 0–0.7)
EOSINOPHIL NFR BLD AUTO: 3 %
ERYTHROCYTE [DISTWIDTH] IN BLOOD BY AUTOMATED COUNT: 15.8 % (ref 10–15)
GFR SERPL CREATININE-BSD FRML MDRD: >90 ML/MIN/1.73M2
GLUCOSE SERPL-MCNC: 88 MG/DL (ref 70–99)
HBV SURFACE AG SERPL QL IA: NONREACTIVE
HCT VFR BLD AUTO: 32 % (ref 40–53)
HGB BLD-MCNC: 9.9 G/DL (ref 13.3–17.7)
HIV 1+2 AB+HIV1 P24 AG SERPL QL IA: NONREACTIVE
IMM GRANULOCYTES # BLD: 0.1 10E3/UL
IMM GRANULOCYTES NFR BLD: 1 %
LYMPHOCYTES # BLD AUTO: 0.8 10E3/UL (ref 0.8–5.3)
LYMPHOCYTES NFR BLD AUTO: 19 %
MAGNESIUM SERPL-MCNC: 1.7 MG/DL (ref 1.7–2.3)
MCH RBC QN AUTO: 30.4 PG (ref 26.5–33)
MCHC RBC AUTO-ENTMCNC: 30.9 G/DL (ref 31.5–36.5)
MCV RBC AUTO: 98 FL (ref 78–100)
MONOCYTES # BLD AUTO: 0.5 10E3/UL (ref 0–1.3)
MONOCYTES NFR BLD AUTO: 11 %
NEUTROPHILS # BLD AUTO: 3 10E3/UL (ref 1.6–8.3)
NEUTROPHILS NFR BLD AUTO: 66 %
NRBC # BLD AUTO: 0 10E3/UL
NRBC BLD AUTO-RTO: 0 /100
PLATELET # BLD AUTO: 159 10E3/UL (ref 150–450)
POTASSIUM SERPL-SCNC: 3.7 MMOL/L (ref 3.4–5.3)
RBC # BLD AUTO: 3.26 10E6/UL (ref 4.4–5.9)
SODIUM SERPL-SCNC: 141 MMOL/L (ref 136–145)
WBC # BLD AUTO: 4.5 10E3/UL (ref 4–11)

## 2023-03-24 PROCEDURE — 97535 SELF CARE MNGMENT TRAINING: CPT | Mod: GO

## 2023-03-24 PROCEDURE — 250N000011 HC RX IP 250 OP 636: Performed by: INTERNAL MEDICINE

## 2023-03-24 PROCEDURE — 250N000013 HC RX MED GY IP 250 OP 250 PS 637: Performed by: INTERNAL MEDICINE

## 2023-03-24 PROCEDURE — C9113 INJ PANTOPRAZOLE SODIUM, VIA: HCPCS | Performed by: INTERNAL MEDICINE

## 2023-03-24 PROCEDURE — 97110 THERAPEUTIC EXERCISES: CPT | Mod: GO

## 2023-03-24 PROCEDURE — 36415 COLL VENOUS BLD VENIPUNCTURE: CPT | Performed by: INTERNAL MEDICINE

## 2023-03-24 PROCEDURE — 99232 SBSQ HOSP IP/OBS MODERATE 35: CPT | Performed by: INTERNAL MEDICINE

## 2023-03-24 PROCEDURE — 97530 THERAPEUTIC ACTIVITIES: CPT | Mod: GP

## 2023-03-24 PROCEDURE — 85025 COMPLETE CBC W/AUTO DIFF WBC: CPT | Performed by: INTERNAL MEDICINE

## 2023-03-24 PROCEDURE — 83735 ASSAY OF MAGNESIUM: CPT | Performed by: INTERNAL MEDICINE

## 2023-03-24 PROCEDURE — 120N000001 HC R&B MED SURG/OB

## 2023-03-24 PROCEDURE — 80048 BASIC METABOLIC PNL TOTAL CA: CPT | Performed by: INTERNAL MEDICINE

## 2023-03-24 RX ORDER — HYDRALAZINE HYDROCHLORIDE 20 MG/ML
10 INJECTION INTRAMUSCULAR; INTRAVENOUS EVERY 6 HOURS PRN
Status: DISCONTINUED | OUTPATIENT
Start: 2023-03-24 | End: 2023-03-31 | Stop reason: HOSPADM

## 2023-03-24 RX ADMIN — SERTRALINE HYDROCHLORIDE 150 MG: 100 TABLET ORAL at 07:51

## 2023-03-24 RX ADMIN — CLOPIDOGREL BISULFATE 75 MG: 75 TABLET ORAL at 07:51

## 2023-03-24 RX ADMIN — ASPIRIN 81 MG CHEWABLE TABLET 81 MG: 81 TABLET CHEWABLE at 07:51

## 2023-03-24 RX ADMIN — OXYCODONE HYDROCHLORIDE 5 MG: 5 TABLET ORAL at 20:02

## 2023-03-24 RX ADMIN — BUSPIRONE HYDROCHLORIDE 15 MG: 15 TABLET ORAL at 01:37

## 2023-03-24 RX ADMIN — BUSPIRONE HYDROCHLORIDE 15 MG: 15 TABLET ORAL at 16:44

## 2023-03-24 RX ADMIN — CEFEPIME 2 G: 2 INJECTION, POWDER, FOR SOLUTION INTRAVENOUS at 01:37

## 2023-03-24 RX ADMIN — FENTANYL 1 PATCH: 12 PATCH, EXTENDED RELEASE TRANSDERMAL at 13:22

## 2023-03-24 RX ADMIN — ACETAMINOPHEN 1000 MG: 500 TABLET ORAL at 20:02

## 2023-03-24 RX ADMIN — OXYCODONE HYDROCHLORIDE 5 MG: 5 TABLET ORAL at 08:03

## 2023-03-24 RX ADMIN — CEFEPIME 2 G: 2 INJECTION, POWDER, FOR SOLUTION INTRAVENOUS at 13:22

## 2023-03-24 RX ADMIN — CEFEPIME 2 G: 2 INJECTION, POWDER, FOR SOLUTION INTRAVENOUS at 21:26

## 2023-03-24 RX ADMIN — ACETAMINOPHEN 1000 MG: 500 TABLET ORAL at 08:03

## 2023-03-24 RX ADMIN — ATORVASTATIN CALCIUM 60 MG: 20 TABLET, FILM COATED ORAL at 16:44

## 2023-03-24 RX ADMIN — METRONIDAZOLE 500 MG: 500 INJECTION, SOLUTION INTRAVENOUS at 02:29

## 2023-03-24 RX ADMIN — BUSPIRONE HYDROCHLORIDE 15 MG: 15 TABLET ORAL at 07:51

## 2023-03-24 RX ADMIN — LEVOTHYROXINE SODIUM 50 MCG: 50 TABLET ORAL at 07:51

## 2023-03-24 RX ADMIN — METRONIDAZOLE 500 MG: 500 INJECTION, SOLUTION INTRAVENOUS at 22:27

## 2023-03-24 RX ADMIN — TRAZODONE HYDROCHLORIDE 100 MG: 100 TABLET ORAL at 21:26

## 2023-03-24 RX ADMIN — DONEPEZIL HYDROCHLORIDE 10 MG: 10 TABLET ORAL at 21:26

## 2023-03-24 RX ADMIN — METRONIDAZOLE 500 MG: 500 INJECTION, SOLUTION INTRAVENOUS at 14:07

## 2023-03-24 RX ADMIN — PANTOPRAZOLE SODIUM 40 MG: 40 INJECTION, POWDER, FOR SOLUTION INTRAVENOUS at 07:51

## 2023-03-24 ASSESSMENT — ACTIVITIES OF DAILY LIVING (ADL)
ADLS_ACUITY_SCORE: 41
ADLS_ACUITY_SCORE: 35
ADLS_ACUITY_SCORE: 41
ADLS_ACUITY_SCORE: 41
ADLS_ACUITY_SCORE: 34
ADLS_ACUITY_SCORE: 35
ADLS_ACUITY_SCORE: 37
ADLS_ACUITY_SCORE: 41
ADLS_ACUITY_SCORE: 34
ADLS_ACUITY_SCORE: 41
ADLS_ACUITY_SCORE: 35
ADLS_ACUITY_SCORE: 34

## 2023-03-24 NOTE — PROGRESS NOTES
Care Management Follow Up    Length of Stay (days): 5    Expected Discharge Date: 03/24/2023     Concerns to be Addressed: discharge planning     Patient plan of care discussed at interdisciplinary rounds: Yes  Anticipated Discharge Disposition:  TCU     Anticipated Discharge Services:  TCU    Patient/family educated on Medicare website which has current facility and service quality ratings: yes  Education Provided on the Discharge Plan: yes    Patient/Family in Agreement with the Plan:  yes    Referrals Placed by CM/SW: Internal Clinic Care Coordination, Post Acute Facilities  Private pay costs discussed: Not applicable    Additional Information:    Per IDT rounds today, MD team states that patient is medically ready for discharge today. It was discussed in rounds that finding an accepting TCU may be difficult due to continued loose stools from Norovirus.     [facility] TCU Phone: (Admissions: [phone] RN Report: [phone] Fax: [phone]) has accepted patient for TCU cares. Spoke with Sophia, admissions at [facility] regarding discharge plans. Per Sophia, they could have accepted patient for cares over the weekend, but given that the patient continues to have multiple loose stools a day, Sophia would like to hold off on a weekend admission and readdress on Monday.     PLAN: Likely [facility] TCU Monday 3/27    KARINA CORNEJO RN
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
## 2023-03-24 NOTE — PROGRESS NOTES
"Pt A & O x 3, forgetful about situation and duration of stay, able to make needs known, on 1 LPM nasal canula, assist of 2 with walker and gait belt, incontinent of stool at times, scrotum and coccyx red blanchable with barrier cream applied, PIV infusing LR 75 ml/h, expiratory wheezing, pain 5-7/10 when asked the location the pt stated every where, rebound tenderness in all 4 Q, loose stool today, poor appetite, wife asked to make sure he can order from the menu to possible increase oral intake. Placed cushion in chair and chair alarm active. BP (!) 156/71 (BP Location: Right arm)   Pulse 56   Temp 98.1  F (36.7  C) (Oral)   Resp 18   Ht 1.803 m (5' 11\")   Wt 88.9 kg (195 lb 15.8 oz)   SpO2 94%   BMI 27.33 kg/m    "

## 2023-03-24 NOTE — PROGRESS NOTES
Received critical value from microbiology lab: an extra organism is growing in the blood culture.    Positive on the 1st day of incubation Abnormal        Clostridium ramosum Panic     1 of 2 bottles   Granulicatella adiacens Panic     2 of 2 bottles    Haemophilus parainfluenzae Panic     1 of 2 bottles    Eikenella corrodens Panic     1 of 2 bottles   Gemella morbillorum Panic     1 of 2 bottles  Susceptibilities done on previous cultures     Sona Key RN BSN

## 2023-03-25 LAB
ANION GAP SERPL CALCULATED.3IONS-SCNC: 7 MMOL/L (ref 7–15)
BACTERIA BLD CULT: ABNORMAL
BACTERIA BLD CULT: NO GROWTH
BACTERIA BLD CULT: NO GROWTH
BASOPHILS # BLD AUTO: 0 10E3/UL (ref 0–0.2)
BASOPHILS NFR BLD AUTO: 1 %
BUN SERPL-MCNC: 10.4 MG/DL (ref 8–23)
CALCIUM SERPL-MCNC: 9 MG/DL (ref 8.8–10.2)
CHLORIDE SERPL-SCNC: 108 MMOL/L (ref 98–107)
CREAT SERPL-MCNC: 0.7 MG/DL (ref 0.67–1.17)
DEPRECATED HCO3 PLAS-SCNC: 25 MMOL/L (ref 22–29)
EOSINOPHIL # BLD AUTO: 0.2 10E3/UL (ref 0–0.7)
EOSINOPHIL NFR BLD AUTO: 4 %
ERYTHROCYTE [DISTWIDTH] IN BLOOD BY AUTOMATED COUNT: 15.9 % (ref 10–15)
GFR SERPL CREATININE-BSD FRML MDRD: >90 ML/MIN/1.73M2
GLUCOSE SERPL-MCNC: 85 MG/DL (ref 70–99)
HCT VFR BLD AUTO: 33.9 % (ref 40–53)
HGB BLD-MCNC: 10.7 G/DL (ref 13.3–17.7)
IMM GRANULOCYTES # BLD: 0 10E3/UL
IMM GRANULOCYTES NFR BLD: 1 %
LYMPHOCYTES # BLD AUTO: 1 10E3/UL (ref 0.8–5.3)
LYMPHOCYTES NFR BLD AUTO: 15 %
MAGNESIUM SERPL-MCNC: 1.7 MG/DL (ref 1.7–2.3)
MCH RBC QN AUTO: 30.6 PG (ref 26.5–33)
MCHC RBC AUTO-ENTMCNC: 31.6 G/DL (ref 31.5–36.5)
MCV RBC AUTO: 97 FL (ref 78–100)
MONOCYTES # BLD AUTO: 0.5 10E3/UL (ref 0–1.3)
MONOCYTES NFR BLD AUTO: 8 %
NEUTROPHILS # BLD AUTO: 4.7 10E3/UL (ref 1.6–8.3)
NEUTROPHILS NFR BLD AUTO: 71 %
NRBC # BLD AUTO: 0 10E3/UL
NRBC BLD AUTO-RTO: 0 /100
PLATELET # BLD AUTO: 169 10E3/UL (ref 150–450)
POTASSIUM SERPL-SCNC: 3.9 MMOL/L (ref 3.4–5.3)
RBC # BLD AUTO: 3.5 10E6/UL (ref 4.4–5.9)
SODIUM SERPL-SCNC: 140 MMOL/L (ref 136–145)
WBC # BLD AUTO: 6.5 10E3/UL (ref 4–11)

## 2023-03-25 PROCEDURE — 80048 BASIC METABOLIC PNL TOTAL CA: CPT | Performed by: INTERNAL MEDICINE

## 2023-03-25 PROCEDURE — 250N000011 HC RX IP 250 OP 636: Performed by: INTERNAL MEDICINE

## 2023-03-25 PROCEDURE — 83735 ASSAY OF MAGNESIUM: CPT | Performed by: INTERNAL MEDICINE

## 2023-03-25 PROCEDURE — 250N000013 HC RX MED GY IP 250 OP 250 PS 637: Performed by: INTERNAL MEDICINE

## 2023-03-25 PROCEDURE — 258N000003 HC RX IP 258 OP 636: Performed by: INTERNAL MEDICINE

## 2023-03-25 PROCEDURE — 36415 COLL VENOUS BLD VENIPUNCTURE: CPT | Performed by: INTERNAL MEDICINE

## 2023-03-25 PROCEDURE — 120N000001 HC R&B MED SURG/OB

## 2023-03-25 PROCEDURE — 250N000013 HC RX MED GY IP 250 OP 250 PS 637: Performed by: FAMILY MEDICINE

## 2023-03-25 PROCEDURE — 85025 COMPLETE CBC W/AUTO DIFF WBC: CPT | Performed by: INTERNAL MEDICINE

## 2023-03-25 PROCEDURE — 99232 SBSQ HOSP IP/OBS MODERATE 35: CPT | Performed by: FAMILY MEDICINE

## 2023-03-25 PROCEDURE — C9113 INJ PANTOPRAZOLE SODIUM, VIA: HCPCS | Performed by: INTERNAL MEDICINE

## 2023-03-25 RX ORDER — LOSARTAN POTASSIUM 50 MG/1
50 TABLET ORAL DAILY
Status: DISCONTINUED | OUTPATIENT
Start: 2023-03-25 | End: 2023-03-31 | Stop reason: HOSPADM

## 2023-03-25 RX ORDER — MORPHINE SULFATE 2 MG/ML
2 INJECTION, SOLUTION INTRAMUSCULAR; INTRAVENOUS
Status: COMPLETED | OUTPATIENT
Start: 2023-03-25 | End: 2023-03-25

## 2023-03-25 RX ORDER — FUROSEMIDE 20 MG
20 TABLET ORAL ONCE
Status: COMPLETED | OUTPATIENT
Start: 2023-03-25 | End: 2023-03-25

## 2023-03-25 RX ADMIN — CLOPIDOGREL BISULFATE 75 MG: 75 TABLET ORAL at 07:50

## 2023-03-25 RX ADMIN — ACETAMINOPHEN 1000 MG: 500 TABLET ORAL at 10:38

## 2023-03-25 RX ADMIN — FUROSEMIDE 20 MG: 20 TABLET ORAL at 13:00

## 2023-03-25 RX ADMIN — TRAZODONE HYDROCHLORIDE 100 MG: 100 TABLET ORAL at 21:58

## 2023-03-25 RX ADMIN — CEFEPIME 2 G: 2 INJECTION, POWDER, FOR SOLUTION INTRAVENOUS at 21:40

## 2023-03-25 RX ADMIN — METRONIDAZOLE 500 MG: 500 INJECTION, SOLUTION INTRAVENOUS at 07:51

## 2023-03-25 RX ADMIN — ONDANSETRON 4 MG: 2 INJECTION INTRAMUSCULAR; INTRAVENOUS at 17:31

## 2023-03-25 RX ADMIN — OXYCODONE HYDROCHLORIDE 5 MG: 5 TABLET ORAL at 17:32

## 2023-03-25 RX ADMIN — ASPIRIN 81 MG CHEWABLE TABLET 81 MG: 81 TABLET CHEWABLE at 07:50

## 2023-03-25 RX ADMIN — BUSPIRONE HYDROCHLORIDE 15 MG: 15 TABLET ORAL at 00:23

## 2023-03-25 RX ADMIN — METRONIDAZOLE 500 MG: 500 INJECTION, SOLUTION INTRAVENOUS at 13:46

## 2023-03-25 RX ADMIN — CEFEPIME 2 G: 2 INJECTION, POWDER, FOR SOLUTION INTRAVENOUS at 06:17

## 2023-03-25 RX ADMIN — OXYCODONE HYDROCHLORIDE 5 MG: 5 TABLET ORAL at 10:38

## 2023-03-25 RX ADMIN — ACETAMINOPHEN 1000 MG: 500 TABLET ORAL at 02:55

## 2023-03-25 RX ADMIN — OXYCODONE HYDROCHLORIDE 5 MG: 5 TABLET ORAL at 00:20

## 2023-03-25 RX ADMIN — ATORVASTATIN CALCIUM 60 MG: 20 TABLET, FILM COATED ORAL at 17:33

## 2023-03-25 RX ADMIN — LOSARTAN POTASSIUM 50 MG: 50 TABLET, FILM COATED ORAL at 12:54

## 2023-03-25 RX ADMIN — CEFEPIME 2 G: 2 INJECTION, POWDER, FOR SOLUTION INTRAVENOUS at 12:54

## 2023-03-25 RX ADMIN — SERTRALINE HYDROCHLORIDE 150 MG: 100 TABLET ORAL at 07:50

## 2023-03-25 RX ADMIN — PANTOPRAZOLE SODIUM 40 MG: 40 INJECTION, POWDER, FOR SOLUTION INTRAVENOUS at 07:50

## 2023-03-25 RX ADMIN — BUSPIRONE HYDROCHLORIDE 15 MG: 15 TABLET ORAL at 07:50

## 2023-03-25 RX ADMIN — MORPHINE SULFATE 2 MG: 2 INJECTION, SOLUTION INTRAMUSCULAR; INTRAVENOUS at 21:58

## 2023-03-25 RX ADMIN — SODIUM CHLORIDE, POTASSIUM CHLORIDE, SODIUM LACTATE AND CALCIUM CHLORIDE: 600; 310; 30; 20 INJECTION, SOLUTION INTRAVENOUS at 07:51

## 2023-03-25 RX ADMIN — ONDANSETRON 4 MG: 4 TABLET, ORALLY DISINTEGRATING ORAL at 10:29

## 2023-03-25 RX ADMIN — DONEPEZIL HYDROCHLORIDE 10 MG: 10 TABLET ORAL at 21:58

## 2023-03-25 RX ADMIN — HYDRALAZINE HYDROCHLORIDE 10 MG: 20 INJECTION INTRAMUSCULAR; INTRAVENOUS at 08:16

## 2023-03-25 RX ADMIN — BUSPIRONE HYDROCHLORIDE 15 MG: 15 TABLET ORAL at 17:33

## 2023-03-25 RX ADMIN — LEVOTHYROXINE SODIUM 50 MCG: 50 TABLET ORAL at 07:50

## 2023-03-25 RX ADMIN — METRONIDAZOLE 500 MG: 500 INJECTION, SOLUTION INTRAVENOUS at 22:52

## 2023-03-25 ASSESSMENT — ACTIVITIES OF DAILY LIVING (ADL)
ADLS_ACUITY_SCORE: 43
ADLS_ACUITY_SCORE: 46
ADLS_ACUITY_SCORE: 41
ADLS_ACUITY_SCORE: 46
ADLS_ACUITY_SCORE: 41
ADLS_ACUITY_SCORE: 38
ADLS_ACUITY_SCORE: 41
ADLS_ACUITY_SCORE: 43
ADLS_ACUITY_SCORE: 37
ADLS_ACUITY_SCORE: 46
ADLS_ACUITY_SCORE: 46
ADLS_ACUITY_SCORE: 41

## 2023-03-25 NOTE — PROGRESS NOTES
Patient alert and oriented x 3 (disoriented to time); tends to ask the same questions multiple times. Vitals stable; patient remains hypertensive but beneath PRN threshold. On 1 LPM O2 via nasal cannula. Pain in bilateral lower legs 8/10, well relieved with PRN tylenol and oxycodone. Patient was up in the chair for dinner; currently resting in bed. Ambulated with Ax1, gait belt, and walker. Torres catheter removed; patient has not yet voided. PIV infusing.    Temp: 98.3  F (36.8  C) Temp src: Oral BP: (!) 160/69 Pulse: 59   Resp: 18 SpO2: 92 % O2 Device: Nasal cannula Oxygen Delivery: 1 LPM
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
## 2023-03-25 NOTE — PROGRESS NOTES
"Pt A & O x 4, forgetful with situation, son at beside early today, pt able to make needs known, moves with assist of 1-2 with walker and gait belt, barrier cream applied to bottom/scrotum due to irritation from loose BM, good relief with the use of nausea medication, ate 4 slices of tst with butter and jelly, fruit,  drinking well, BP (!) 144/58 (BP Location: Right arm)   Pulse 63   Temp 98  F (36.7  C) (Oral)   Resp 18   Ht 1.803 m (5' 11\")   Wt 98.4 kg (216 lb 14.9 oz)   SpO2 92%   BMI 30.26 kg/m    "

## 2023-03-25 NOTE — PROGRESS NOTES
"[id number]    Neuro: Patient is alert to self.  Was able to tell nurse that he was in the hospital but he was not sure which one.  Was not able to tell nurse the month or year. Has been calm and cooperative.   Cardiac: Bradycardia.  Respiratory: LS are clear.  O2 sats 92% on 1 L of O2  GI/: Patient has been incontinent of urine x 2 since lópez was removed last shift.  Bladder scan done at 0319 with results of 0.  Mobility: Assist of 1-2, gait belt and walker.  Diet: Regular diet  Pain: Patient c/o bilateral lower extremity pain and was given PO PRN oxycodone 5 mg without much relief and then was given Tylenol 650 mg with good relief.  Skin: No changes.  LDA: PIV Left lower forearm running LR @ 7/hr.  Right chest CVC  VS: BP (!) 168/77 (BP Location: Left arm)   Pulse 66   Temp 98.3  F (36.8  C) (Oral)   Resp 18   Ht 1.803 m (5' 11\")   Wt 98.4 kg (216 lb 14.9 oz)   SpO2 92%   BMI 30.26 kg/m        "
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
## 2023-03-26 ENCOUNTER — APPOINTMENT (OUTPATIENT)
Dept: OCCUPATIONAL THERAPY | Facility: CLINIC | Age: 83
DRG: 871 | End: 2023-03-26
Payer: MEDICARE

## 2023-03-26 ENCOUNTER — APPOINTMENT (OUTPATIENT)
Dept: GENERAL RADIOLOGY | Facility: CLINIC | Age: 83
DRG: 871 | End: 2023-03-26
Attending: FAMILY MEDICINE
Payer: MEDICARE

## 2023-03-26 ENCOUNTER — APPOINTMENT (OUTPATIENT)
Dept: PHYSICAL THERAPY | Facility: CLINIC | Age: 83
DRG: 871 | End: 2023-03-26
Payer: MEDICARE

## 2023-03-26 LAB
ALBUMIN SERPL BCG-MCNC: 2.6 G/DL (ref 3.5–5.2)
ALP SERPL-CCNC: 76 U/L (ref 40–129)
ALT SERPL W P-5'-P-CCNC: 26 U/L (ref 10–50)
ANION GAP SERPL CALCULATED.3IONS-SCNC: 6 MMOL/L (ref 7–15)
AST SERPL W P-5'-P-CCNC: 42 U/L (ref 10–50)
BILIRUB SERPL-MCNC: 0.3 MG/DL
BUN SERPL-MCNC: 8.4 MG/DL (ref 8–23)
CALCIUM SERPL-MCNC: 8.5 MG/DL (ref 8.8–10.2)
CHLORIDE SERPL-SCNC: 108 MMOL/L (ref 98–107)
CREAT SERPL-MCNC: 0.71 MG/DL (ref 0.67–1.17)
DEPRECATED HCO3 PLAS-SCNC: 27 MMOL/L (ref 22–29)
ERYTHROCYTE [DISTWIDTH] IN BLOOD BY AUTOMATED COUNT: 16.1 % (ref 10–15)
GFR SERPL CREATININE-BSD FRML MDRD: 90 ML/MIN/1.73M2
GLUCOSE SERPL-MCNC: 96 MG/DL (ref 70–99)
HCT VFR BLD AUTO: 31.9 % (ref 40–53)
HCV RNA SERPL NAA+PROBE-ACNC: NOT DETECTED IU/ML
HGB BLD-MCNC: 10 G/DL (ref 13.3–17.7)
LIPASE SERPL-CCNC: 20 U/L (ref 13–60)
MAGNESIUM SERPL-MCNC: 1.7 MG/DL (ref 1.7–2.3)
MCH RBC QN AUTO: 30.7 PG (ref 26.5–33)
MCHC RBC AUTO-ENTMCNC: 31.3 G/DL (ref 31.5–36.5)
MCV RBC AUTO: 98 FL (ref 78–100)
PLATELET # BLD AUTO: 166 10E3/UL (ref 150–450)
POTASSIUM SERPL-SCNC: 3.6 MMOL/L (ref 3.4–5.3)
PROT SERPL-MCNC: 4.9 G/DL (ref 6.4–8.3)
RBC # BLD AUTO: 3.26 10E6/UL (ref 4.4–5.9)
SODIUM SERPL-SCNC: 141 MMOL/L (ref 136–145)
WBC # BLD AUTO: 7.5 10E3/UL (ref 4–11)

## 2023-03-26 PROCEDURE — 85027 COMPLETE CBC AUTOMATED: CPT | Performed by: FAMILY MEDICINE

## 2023-03-26 PROCEDURE — 97116 GAIT TRAINING THERAPY: CPT | Mod: GP

## 2023-03-26 PROCEDURE — 83690 ASSAY OF LIPASE: CPT | Performed by: FAMILY MEDICINE

## 2023-03-26 PROCEDURE — 83735 ASSAY OF MAGNESIUM: CPT | Performed by: INTERNAL MEDICINE

## 2023-03-26 PROCEDURE — 250N000013 HC RX MED GY IP 250 OP 250 PS 637: Performed by: FAMILY MEDICINE

## 2023-03-26 PROCEDURE — 80053 COMPREHEN METABOLIC PANEL: CPT | Performed by: FAMILY MEDICINE

## 2023-03-26 PROCEDURE — 97535 SELF CARE MNGMENT TRAINING: CPT | Mod: GO

## 2023-03-26 PROCEDURE — 120N000001 HC R&B MED SURG/OB

## 2023-03-26 PROCEDURE — 99233 SBSQ HOSP IP/OBS HIGH 50: CPT | Performed by: FAMILY MEDICINE

## 2023-03-26 PROCEDURE — 250N000013 HC RX MED GY IP 250 OP 250 PS 637: Performed by: INTERNAL MEDICINE

## 2023-03-26 PROCEDURE — 36415 COLL VENOUS BLD VENIPUNCTURE: CPT | Performed by: FAMILY MEDICINE

## 2023-03-26 PROCEDURE — 250N000011 HC RX IP 250 OP 636: Performed by: INTERNAL MEDICINE

## 2023-03-26 PROCEDURE — 250N000011 HC RX IP 250 OP 636: Performed by: FAMILY MEDICINE

## 2023-03-26 PROCEDURE — 258N000003 HC RX IP 258 OP 636: Performed by: INTERNAL MEDICINE

## 2023-03-26 PROCEDURE — 71045 X-RAY EXAM CHEST 1 VIEW: CPT

## 2023-03-26 PROCEDURE — C9113 INJ PANTOPRAZOLE SODIUM, VIA: HCPCS | Performed by: INTERNAL MEDICINE

## 2023-03-26 RX ORDER — NYSTATIN 100000 U/G
CREAM TOPICAL 2 TIMES DAILY
Status: DISCONTINUED | OUTPATIENT
Start: 2023-03-26 | End: 2023-03-31 | Stop reason: HOSPADM

## 2023-03-26 RX ORDER — FUROSEMIDE 10 MG/ML
20 INJECTION INTRAMUSCULAR; INTRAVENOUS 2 TIMES DAILY
Status: DISCONTINUED | OUTPATIENT
Start: 2023-03-26 | End: 2023-03-31 | Stop reason: HOSPADM

## 2023-03-26 RX ORDER — METRONIDAZOLE 500 MG/1
500 TABLET ORAL 3 TIMES DAILY
Status: DISCONTINUED | OUTPATIENT
Start: 2023-03-26 | End: 2023-03-31 | Stop reason: HOSPADM

## 2023-03-26 RX ORDER — FUROSEMIDE 20 MG
20 TABLET ORAL DAILY
Status: CANCELLED | OUTPATIENT
Start: 2023-03-27

## 2023-03-26 RX ORDER — CEFDINIR 300 MG/1
300 CAPSULE ORAL EVERY 12 HOURS SCHEDULED
Status: DISCONTINUED | OUTPATIENT
Start: 2023-03-26 | End: 2023-03-31 | Stop reason: HOSPADM

## 2023-03-26 RX ADMIN — ONDANSETRON 4 MG: 4 TABLET, ORALLY DISINTEGRATING ORAL at 08:18

## 2023-03-26 RX ADMIN — LOSARTAN POTASSIUM 50 MG: 50 TABLET, FILM COATED ORAL at 08:52

## 2023-03-26 RX ADMIN — DONEPEZIL HYDROCHLORIDE 10 MG: 10 TABLET ORAL at 22:29

## 2023-03-26 RX ADMIN — NYSTATIN: 100000 CREAM TOPICAL at 22:32

## 2023-03-26 RX ADMIN — ATORVASTATIN CALCIUM 60 MG: 20 TABLET, FILM COATED ORAL at 17:07

## 2023-03-26 RX ADMIN — ACETAMINOPHEN 1000 MG: 500 TABLET ORAL at 23:54

## 2023-03-26 RX ADMIN — TRAZODONE HYDROCHLORIDE 100 MG: 100 TABLET ORAL at 22:29

## 2023-03-26 RX ADMIN — METRONIDAZOLE 500 MG: 500 INJECTION, SOLUTION INTRAVENOUS at 06:53

## 2023-03-26 RX ADMIN — ONDANSETRON 4 MG: 4 TABLET, ORALLY DISINTEGRATING ORAL at 20:59

## 2023-03-26 RX ADMIN — METRONIDAZOLE 500 MG: 500 TABLET ORAL at 14:09

## 2023-03-26 RX ADMIN — FENTANYL 1 PATCH: 12 PATCH, EXTENDED RELEASE TRANSDERMAL at 14:07

## 2023-03-26 RX ADMIN — BUSPIRONE HYDROCHLORIDE 15 MG: 15 TABLET ORAL at 00:01

## 2023-03-26 RX ADMIN — CEFDINIR 300 MG: 300 CAPSULE ORAL at 19:26

## 2023-03-26 RX ADMIN — ASPIRIN 81 MG CHEWABLE TABLET 81 MG: 81 TABLET CHEWABLE at 08:49

## 2023-03-26 RX ADMIN — ACETAMINOPHEN 1000 MG: 500 TABLET ORAL at 06:09

## 2023-03-26 RX ADMIN — OXYCODONE HYDROCHLORIDE 5 MG: 5 TABLET ORAL at 17:07

## 2023-03-26 RX ADMIN — LEVOTHYROXINE SODIUM 50 MCG: 50 TABLET ORAL at 08:51

## 2023-03-26 RX ADMIN — METRONIDAZOLE 500 MG: 500 TABLET ORAL at 19:26

## 2023-03-26 RX ADMIN — PANTOPRAZOLE SODIUM 40 MG: 40 INJECTION, POWDER, FOR SOLUTION INTRAVENOUS at 11:06

## 2023-03-26 RX ADMIN — BUSPIRONE HYDROCHLORIDE 15 MG: 15 TABLET ORAL at 23:49

## 2023-03-26 RX ADMIN — OXYCODONE HYDROCHLORIDE 5 MG: 5 TABLET ORAL at 21:07

## 2023-03-26 RX ADMIN — CEFEPIME 2 G: 2 INJECTION, POWDER, FOR SOLUTION INTRAVENOUS at 05:58

## 2023-03-26 RX ADMIN — BUSPIRONE HYDROCHLORIDE 15 MG: 15 TABLET ORAL at 17:07

## 2023-03-26 RX ADMIN — CLOPIDOGREL BISULFATE 75 MG: 75 TABLET ORAL at 08:51

## 2023-03-26 RX ADMIN — SODIUM CHLORIDE, POTASSIUM CHLORIDE, SODIUM LACTATE AND CALCIUM CHLORIDE: 600; 310; 30; 20 INJECTION, SOLUTION INTRAVENOUS at 01:34

## 2023-03-26 RX ADMIN — BUSPIRONE HYDROCHLORIDE 15 MG: 15 TABLET ORAL at 08:50

## 2023-03-26 RX ADMIN — SERTRALINE HYDROCHLORIDE 150 MG: 100 TABLET ORAL at 08:50

## 2023-03-26 RX ADMIN — FUROSEMIDE 20 MG: 10 INJECTION, SOLUTION INTRAMUSCULAR; INTRAVENOUS at 11:07

## 2023-03-26 RX ADMIN — FUROSEMIDE 20 MG: 20 TABLET ORAL at 08:49

## 2023-03-26 RX ADMIN — FUROSEMIDE 20 MG: 10 INJECTION, SOLUTION INTRAMUSCULAR; INTRAVENOUS at 19:26

## 2023-03-26 ASSESSMENT — ACTIVITIES OF DAILY LIVING (ADL)
ADLS_ACUITY_SCORE: 43
ADLS_ACUITY_SCORE: 42
ADLS_ACUITY_SCORE: 42
ADLS_ACUITY_SCORE: 46
ADLS_ACUITY_SCORE: 42
ADLS_ACUITY_SCORE: 46
ADLS_ACUITY_SCORE: 42
ADLS_ACUITY_SCORE: 46
ADLS_ACUITY_SCORE: 46

## 2023-03-26 NOTE — PLAN OF CARE
Appears to be feeling better this afternoon.  Ate most of his lunch Has been urinating a lot he rates his pain a 5  Replaced his fentanyl patch.has not asked for anything else to control his pain.  Has remained on room air and sats  have been 92%

## 2023-03-26 NOTE — PLAN OF CARE
Since patient received lasix this morning ( lasix 20 mg po and lasix 20 mg IV) he has had 2775 ml of urine output, primofit external catheter in use for accurate output and to manage patient's incontinence while diuresing.   O2 sats 94 %  RA while sitting up in chair, once in bed 02 sats dipped down to 89% RA so 1 lpm of 02 via NC administered and 02 sats up to 94 %.  Patient more alert today, talkative, visited with friend of 50 years for about three hours this afternoon.  Has chronic pain in back radiating to legs, oxycodone 5 mg given around supper for back pain.  Has not had any diarrhea, stooling since 130 am.

## 2023-03-26 NOTE — PROGRESS NOTES
Patient alert and oriented x 3 (disoriented to time). Vitals stable; on 2 LPM O2 via nasal cannula. Generalized pain 10/10, not well relieved with one-time dose of PRN morphine. Patient spent most of the evening up in the chair; currently resting in bed. Patient ambulates with Ax1, walker, and gait belt. Good appetite. One episode of urine incontinence. PIV infusing.     Temp: 98.3  F (36.8  C) Temp src: Oral BP: (!) 142/63 Pulse: 60   Resp: 18 SpO2: 94 % O2 Device: Nasal cannula Oxygen Delivery: 2 LPM

## 2023-03-26 NOTE — PLAN OF CARE
Time: 0024      Reason for Admission: norovirus, pan colitis, biliary duct dialation,      Activity: Stands and pivots with 2 assist and walker and gait belt      Neuro: Alert and oriented to self.  Forgetful. Quiet and cooperative.  Needs should be anticipated.      GI/: Incontinent of stool.  BM soft/ brown mucous,  voided in commode, clear yellow urine.        Respiratory/Lungs: clear and diminished      Skin: dry pale scattered bruises and scabs      Diet: reg      IV Access: right arm.  LR at 50 running.        Vitals: WDL and stable. Afebrile      Pain: 8/10, Warm blanket and repositioning until next pain medication due.        Safety:  Bed alarm activated for safety        Goal Outcome Evaluation:           Overall Patient Progress: improvingOverall Patient Progress: improving

## 2023-03-26 NOTE — PROGRESS NOTES
Patient weight on admission 181 #, weight today 200#. Patient has LE edema, flank edema and periorbital edema. IV went bad, so fluids not running.   Unable to wean off 02, RA 84%, 02 at 2 lpm 92%.LS crackles left base, diminished right field.  Has been reporting nausea for days, po zofran given.   Dr Carl updated re weight, nausea.
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
## 2023-03-27 ENCOUNTER — APPOINTMENT (OUTPATIENT)
Dept: OCCUPATIONAL THERAPY | Facility: CLINIC | Age: 83
DRG: 871 | End: 2023-03-27
Payer: MEDICARE

## 2023-03-27 ENCOUNTER — APPOINTMENT (OUTPATIENT)
Dept: PHYSICAL THERAPY | Facility: CLINIC | Age: 83
DRG: 871 | End: 2023-03-27
Payer: MEDICARE

## 2023-03-27 DIAGNOSIS — M54.12 CHRONIC RADICULAR CERVICAL PAIN: ICD-10-CM

## 2023-03-27 DIAGNOSIS — G89.29 CHRONIC BILATERAL THORACIC BACK PAIN: ICD-10-CM

## 2023-03-27 DIAGNOSIS — G89.29 CHRONIC BILATERAL LOW BACK PAIN, UNSPECIFIED WHETHER SCIATICA PRESENT: ICD-10-CM

## 2023-03-27 DIAGNOSIS — M54.50 CHRONIC BILATERAL LOW BACK PAIN, UNSPECIFIED WHETHER SCIATICA PRESENT: ICD-10-CM

## 2023-03-27 DIAGNOSIS — M15.3 POST-TRAUMATIC OSTEOARTHRITIS OF MULTIPLE JOINTS: ICD-10-CM

## 2023-03-27 DIAGNOSIS — G89.29 CHRONIC RADICULAR CERVICAL PAIN: ICD-10-CM

## 2023-03-27 DIAGNOSIS — M54.6 CHRONIC BILATERAL THORACIC BACK PAIN: ICD-10-CM

## 2023-03-27 DIAGNOSIS — G89.29 CHRONIC INTRACTABLE PAIN: ICD-10-CM

## 2023-03-27 LAB
ALBUMIN SERPL BCG-MCNC: 2.7 G/DL (ref 3.5–5.2)
ALP SERPL-CCNC: 86 U/L (ref 40–129)
ALT SERPL W P-5'-P-CCNC: 26 U/L (ref 10–50)
ANION GAP SERPL CALCULATED.3IONS-SCNC: 7 MMOL/L (ref 7–15)
AST SERPL W P-5'-P-CCNC: 41 U/L (ref 10–50)
BILIRUB SERPL-MCNC: 0.4 MG/DL
BUN SERPL-MCNC: 9.2 MG/DL (ref 8–23)
CALCIUM SERPL-MCNC: 8.9 MG/DL (ref 8.8–10.2)
CHLORIDE SERPL-SCNC: 103 MMOL/L (ref 98–107)
CREAT SERPL-MCNC: 0.78 MG/DL (ref 0.67–1.17)
DEPRECATED HCO3 PLAS-SCNC: 32 MMOL/L (ref 22–29)
ERYTHROCYTE [DISTWIDTH] IN BLOOD BY AUTOMATED COUNT: 16.3 % (ref 10–15)
GFR SERPL CREATININE-BSD FRML MDRD: 88 ML/MIN/1.73M2
GLUCOSE SERPL-MCNC: 104 MG/DL (ref 70–99)
HCT VFR BLD AUTO: 34.8 % (ref 40–53)
HGB BLD-MCNC: 10.9 G/DL (ref 13.3–17.7)
MAGNESIUM SERPL-MCNC: 1.7 MG/DL (ref 1.7–2.3)
MCH RBC QN AUTO: 30.6 PG (ref 26.5–33)
MCHC RBC AUTO-ENTMCNC: 31.3 G/DL (ref 31.5–36.5)
MCV RBC AUTO: 98 FL (ref 78–100)
PLATELET # BLD AUTO: 179 10E3/UL (ref 150–450)
POTASSIUM SERPL-SCNC: 3.1 MMOL/L (ref 3.4–5.3)
POTASSIUM SERPL-SCNC: 3.8 MMOL/L (ref 3.4–5.3)
PROT SERPL-MCNC: 5.4 G/DL (ref 6.4–8.3)
RBC # BLD AUTO: 3.56 10E6/UL (ref 4.4–5.9)
SODIUM SERPL-SCNC: 142 MMOL/L (ref 136–145)
WBC # BLD AUTO: 7.8 10E3/UL (ref 4–11)

## 2023-03-27 PROCEDURE — 97530 THERAPEUTIC ACTIVITIES: CPT | Mod: GP

## 2023-03-27 PROCEDURE — 99232 SBSQ HOSP IP/OBS MODERATE 35: CPT | Performed by: FAMILY MEDICINE

## 2023-03-27 PROCEDURE — 80053 COMPREHEN METABOLIC PANEL: CPT | Performed by: FAMILY MEDICINE

## 2023-03-27 PROCEDURE — 85027 COMPLETE CBC AUTOMATED: CPT | Performed by: FAMILY MEDICINE

## 2023-03-27 PROCEDURE — 250N000013 HC RX MED GY IP 250 OP 250 PS 637: Performed by: INTERNAL MEDICINE

## 2023-03-27 PROCEDURE — 97535 SELF CARE MNGMENT TRAINING: CPT | Mod: GO

## 2023-03-27 PROCEDURE — 36415 COLL VENOUS BLD VENIPUNCTURE: CPT | Performed by: FAMILY MEDICINE

## 2023-03-27 PROCEDURE — 250N000013 HC RX MED GY IP 250 OP 250 PS 637: Performed by: FAMILY MEDICINE

## 2023-03-27 PROCEDURE — 84132 ASSAY OF SERUM POTASSIUM: CPT | Performed by: FAMILY MEDICINE

## 2023-03-27 PROCEDURE — 120N000001 HC R&B MED SURG/OB

## 2023-03-27 PROCEDURE — 250N000011 HC RX IP 250 OP 636: Performed by: INTERNAL MEDICINE

## 2023-03-27 PROCEDURE — 97110 THERAPEUTIC EXERCISES: CPT | Mod: GP

## 2023-03-27 PROCEDURE — C9113 INJ PANTOPRAZOLE SODIUM, VIA: HCPCS | Performed by: INTERNAL MEDICINE

## 2023-03-27 PROCEDURE — 83735 ASSAY OF MAGNESIUM: CPT | Performed by: FAMILY MEDICINE

## 2023-03-27 PROCEDURE — 250N000011 HC RX IP 250 OP 636: Performed by: FAMILY MEDICINE

## 2023-03-27 RX ORDER — POTASSIUM CHLORIDE 1500 MG/1
40 TABLET, EXTENDED RELEASE ORAL ONCE
Status: COMPLETED | OUTPATIENT
Start: 2023-03-27 | End: 2023-03-27

## 2023-03-27 RX ADMIN — SERTRALINE HYDROCHLORIDE 150 MG: 100 TABLET ORAL at 08:38

## 2023-03-27 RX ADMIN — FUROSEMIDE 20 MG: 10 INJECTION, SOLUTION INTRAMUSCULAR; INTRAVENOUS at 21:02

## 2023-03-27 RX ADMIN — CEFDINIR 300 MG: 300 CAPSULE ORAL at 08:37

## 2023-03-27 RX ADMIN — POTASSIUM CHLORIDE 40 MEQ: 20 TABLET, EXTENDED RELEASE ORAL at 08:36

## 2023-03-27 RX ADMIN — METRONIDAZOLE 500 MG: 500 TABLET ORAL at 21:01

## 2023-03-27 RX ADMIN — DONEPEZIL HYDROCHLORIDE 10 MG: 10 TABLET ORAL at 21:03

## 2023-03-27 RX ADMIN — ACETAMINOPHEN 1000 MG: 500 TABLET ORAL at 14:46

## 2023-03-27 RX ADMIN — CEFDINIR 300 MG: 300 CAPSULE ORAL at 21:11

## 2023-03-27 RX ADMIN — BUSPIRONE HYDROCHLORIDE 15 MG: 15 TABLET ORAL at 23:32

## 2023-03-27 RX ADMIN — ASPIRIN 81 MG CHEWABLE TABLET 81 MG: 81 TABLET CHEWABLE at 08:38

## 2023-03-27 RX ADMIN — FUROSEMIDE 20 MG: 10 INJECTION, SOLUTION INTRAMUSCULAR; INTRAVENOUS at 08:37

## 2023-03-27 RX ADMIN — OXYCODONE HYDROCHLORIDE 5 MG: 5 TABLET ORAL at 11:20

## 2023-03-27 RX ADMIN — ATORVASTATIN CALCIUM 60 MG: 20 TABLET, FILM COATED ORAL at 18:13

## 2023-03-27 RX ADMIN — BUSPIRONE HYDROCHLORIDE 15 MG: 15 TABLET ORAL at 08:38

## 2023-03-27 RX ADMIN — LEVOTHYROXINE SODIUM 50 MCG: 50 TABLET ORAL at 08:38

## 2023-03-27 RX ADMIN — TRAZODONE HYDROCHLORIDE 100 MG: 100 TABLET ORAL at 23:31

## 2023-03-27 RX ADMIN — LOSARTAN POTASSIUM 50 MG: 50 TABLET, FILM COATED ORAL at 08:37

## 2023-03-27 RX ADMIN — NYSTATIN: 100000 CREAM TOPICAL at 08:41

## 2023-03-27 RX ADMIN — DONEPEZIL HYDROCHLORIDE 10 MG: 10 TABLET ORAL at 21:02

## 2023-03-27 RX ADMIN — PANTOPRAZOLE SODIUM 40 MG: 40 INJECTION, POWDER, FOR SOLUTION INTRAVENOUS at 08:39

## 2023-03-27 RX ADMIN — NYSTATIN: 100000 CREAM TOPICAL at 20:58

## 2023-03-27 RX ADMIN — METRONIDAZOLE 500 MG: 500 TABLET ORAL at 08:37

## 2023-03-27 RX ADMIN — CLOPIDOGREL BISULFATE 75 MG: 75 TABLET ORAL at 08:39

## 2023-03-27 RX ADMIN — BUSPIRONE HYDROCHLORIDE 15 MG: 15 TABLET ORAL at 18:13

## 2023-03-27 RX ADMIN — METRONIDAZOLE 500 MG: 500 TABLET ORAL at 14:46

## 2023-03-27 ASSESSMENT — ACTIVITIES OF DAILY LIVING (ADL)
ADLS_ACUITY_SCORE: 47
ADLS_ACUITY_SCORE: 43
ADLS_ACUITY_SCORE: 47
ADLS_ACUITY_SCORE: 43
ADLS_ACUITY_SCORE: 41
ADLS_ACUITY_SCORE: 47
ADLS_ACUITY_SCORE: 43
ADLS_ACUITY_SCORE: 43

## 2023-03-27 NOTE — PROGRESS NOTES
Care Management Follow Up    Length of Stay (days): 8    Expected Discharge Date: 03/28/2023     Concerns to be Addressed: discharge planning     Patient plan of care discussed at interdisciplinary rounds: Yes    Anticipated Discharge Disposition:  TCU     Anticipated Discharge Services:  TCU  Anticipated Discharge DME:  NA    Patient/family educated on Medicare website which has current facility and service quality ratings: yes  Education Provided on the Discharge Plan:  yes  Patient/Family in Agreement with the Plan:  yes    Referrals Placed by CM/SW: Internal Clinic Care Coordination, Post Acute Facilities  Private pay costs discussed: Not applicable    Additional Information:    Per IDT rounds, MD team states that patient is not medically stable for discharge today. Likely discharge tomorrow.     Spoke with Sophia, rodrigue at [facility] Phone: (Admissions: [phone] RN Report: [phone] Fax: [phone]) and provided discharge update. Per Sophia, they will likely have a bed for him tomorrow.     PLAN: [facility]      KARINA CORNEJO RN

## 2023-03-27 NOTE — PROGRESS NOTES
"1900-0730    Neuro: Patient is alert.  Is disorientated to situation but is able to tell writer month, year, his date of birth and where he is.  Has been calm and cooperative.   Cardiac: Apical pulse is irregular.  Has bilateral lower extremity, flank, arm and hand edema that has improved.  Is receiving lasix 20 mg IV 2 times daily.    Respiratory: LS clear.  Diminished LLL and RLL.  SOB with activity.  O2 sats 92% on 1 L of O2.  GI/: Patient c/o of nausea earlier in the shift and was given po Zofran 4 mg with good relief.  Had male external urinary device on at the beginning of the shift but patient was pulling at it and it started leaking.  Patient was noted to have raw skin areas in groin and testicles.  Left urinary device off.  Has had multiple large episodes of urinary incontinence.    Mobility: Assist of one, walker and gait belt to ambulate to the bathroom.  Independent with moving self in bed.  Diet: regular  Pain: Patient has c/o pain in bilateral lower extremities.  Received po oxycodone 5 mg x 1 and Tylenol 650 mg x 1 with fair relief.    Skin: Red/raw in groin and testicle area.  New order received for nystatin cream 2 times daily.    LDA: PIV right lower forearm is saline locked.  VS: BP (!) 165/77   Pulse 52   Temp 98.7  F (37.1  C) (Oral)   Resp 18   Ht 1.803 m (5' 11\")   Wt 99.2 kg (218 lb 11.1 oz)   SpO2 92%   BMI 30.50 kg/m        "

## 2023-03-27 NOTE — PROGRESS NOTES
"CLINICAL NUTRITION SERVICES - ASSESSMENT NOTE     Nutrition Prescription    RECOMMENDATIONS FOR MDs/PROVIDERS TO ORDER:  None at this time    Malnutrition Status:    Patient does note met criteria    Recommendations already ordered by Registered Dietitian (RD):  None at this time; RD sign off.       REASON FOR ASSESSMENT  Sina Gomez is a/an 82 year old male assessed by the dietitian for RN Consult - Patient appetite is poor, diagnosis of pancolitis    NUTRITION HISTORY  Patient is a 82 year old male who presents with septic shock, pan colitis, diarrhea,biliary duct dilation, polymicrobial bacteremia, pancreatic duct dilation, hypoxia, anemia, hypomagnesemia, alzheimer's dementia, anxiety, depression, coronary artery bypass, HTN, chronic pain, hypothyroidism, obstructive sleep apnea, general weakness. Per review of patient weights; patient weight appears to be trending up. Patient appetite noted to be good per RN notes. Per rounding patient plan to discharge tomorrow.   Per patient interview  Patient reports a strong appetite; he denied any concerns with a decreased appetite and stated he was feeling very well today. Patient reported a typical weight to be around 206 lbs and denied any concerns regarding potential unintended weight loss. RD provided contact information should any nutrition concerns/needs arise.     CURRENT NUTRITION ORDERS  Diet: Orders Placed This Encounter      Advance Diet as Tolerated: Regular Diet Adult; Regular Diet Adult      Intake/Tolerance: % of meal; patient appetite regarded as good.     LABS  Labs reviewed  Potassium decreased-3.1 mg/dL    MEDICATIONS  Medications reviewed  Scheduled: Lipitor, Lasix, Synthroid, Flagyl, Protonix, Potassium chloride  Continuous: None  PRN: None pertinent given 2/27    ANTHROPOMETRICS  Height: 180.3 cm (5' 11\")  Most Recent Weight: 99.2 kg (218 lb 11.1 oz)    IBW: 78.1 kg  BMI: Obesity Grade I BMI 30-34.9  Weight History:   Wt Readings from " Last 15 Encounters:   03/26/23 99.2 kg (218 lb 11.1 oz)   02/16/23 91.2 kg (201 lb)   12/15/22 95.3 kg (210 lb)   07/27/22 97.1 kg (214 lb)   07/20/22 96.4 kg (212 lb 8 oz)   07/14/22 98 kg (216 lb)   05/24/22 97.5 kg (215 lb)   05/16/22 98 kg (216 lb)   05/03/22 98 kg (216 lb)   03/21/22 96.6 kg (213 lb)   weight appears to be trending up overall. No significant weight loss noted in patient chart.    Dosing Weight: 83.4 kg-adjusted BW used.    ASSESSED NUTRITION NEEDS  Estimated Energy Needs: 2,085-2,502 kcals/day (25 - 30 kcals/kg)  Justification: Maintenance  Estimated Protein Needs: 100-125 grams protein/day (1.2 - 1.5 grams of pro/kg)  Justification: Increased needs  Estimated Fluid Needs: 2,085-2,502 mL/day (1 mL/kcal)   Justification: Per provider pending fluid status    PHYSICAL FINDINGS  See malnutrition section below.    MALNUTRITION  % Intake: Decreased intake does not meet criteria  % Weight Loss: None noted  Subcutaneous Fat Loss: None observed  Muscle Loss: None observed  Fluid Accumulation/Edema: Mild  Malnutrition Diagnosis: Patient does not meet two of the established criteria necessary for diagnosing malnutrition    NUTRITION DIAGNOSIS  No nutrition diagnosis at this time    INTERVENTIONS  Implementation  None-RD sign off    Monitoring/Evaluation  Progress toward goals will be monitored and evaluated per protocol.    Lizbeth Doshi RDN, LD  Clinical Dietitian  Office: [phone]  Weekend pager: [phone]
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
## 2023-03-28 ENCOUNTER — APPOINTMENT (OUTPATIENT)
Dept: OCCUPATIONAL THERAPY | Facility: CLINIC | Age: 83
DRG: 871 | End: 2023-03-28
Payer: MEDICARE

## 2023-03-28 ENCOUNTER — APPOINTMENT (OUTPATIENT)
Dept: PHYSICAL THERAPY | Facility: CLINIC | Age: 83
DRG: 871 | End: 2023-03-28
Payer: MEDICARE

## 2023-03-28 LAB
ANION GAP SERPL CALCULATED.3IONS-SCNC: 7 MMOL/L (ref 7–15)
BUN SERPL-MCNC: 8.4 MG/DL (ref 8–23)
CALCIUM SERPL-MCNC: 8.9 MG/DL (ref 8.8–10.2)
CHLORIDE SERPL-SCNC: 102 MMOL/L (ref 98–107)
CREAT SERPL-MCNC: 0.67 MG/DL (ref 0.67–1.17)
DEPRECATED HCO3 PLAS-SCNC: 32 MMOL/L (ref 22–29)
ERYTHROCYTE [DISTWIDTH] IN BLOOD BY AUTOMATED COUNT: 16.4 % (ref 10–15)
GFR SERPL CREATININE-BSD FRML MDRD: >90 ML/MIN/1.73M2
GLUCOSE BLDC GLUCOMTR-MCNC: 103 MG/DL (ref 70–99)
GLUCOSE SERPL-MCNC: 95 MG/DL (ref 70–99)
HCT VFR BLD AUTO: 33.8 % (ref 40–53)
HGB BLD-MCNC: 10.6 G/DL (ref 13.3–17.7)
MAGNESIUM SERPL-MCNC: 1.7 MG/DL (ref 1.7–2.3)
MCH RBC QN AUTO: 30.4 PG (ref 26.5–33)
MCHC RBC AUTO-ENTMCNC: 31.4 G/DL (ref 31.5–36.5)
MCV RBC AUTO: 97 FL (ref 78–100)
PLATELET # BLD AUTO: 181 10E3/UL (ref 150–450)
POTASSIUM SERPL-SCNC: 3.5 MMOL/L (ref 3.4–5.3)
RBC # BLD AUTO: 3.49 10E6/UL (ref 4.4–5.9)
SODIUM SERPL-SCNC: 141 MMOL/L (ref 136–145)
WBC # BLD AUTO: 7.5 10E3/UL (ref 4–11)

## 2023-03-28 PROCEDURE — 36415 COLL VENOUS BLD VENIPUNCTURE: CPT | Performed by: FAMILY MEDICINE

## 2023-03-28 PROCEDURE — 250N000013 HC RX MED GY IP 250 OP 250 PS 637: Performed by: INTERNAL MEDICINE

## 2023-03-28 PROCEDURE — 120N000001 HC R&B MED SURG/OB

## 2023-03-28 PROCEDURE — 97535 SELF CARE MNGMENT TRAINING: CPT | Mod: GO

## 2023-03-28 PROCEDURE — C9113 INJ PANTOPRAZOLE SODIUM, VIA: HCPCS | Performed by: INTERNAL MEDICINE

## 2023-03-28 PROCEDURE — 85027 COMPLETE CBC AUTOMATED: CPT | Performed by: FAMILY MEDICINE

## 2023-03-28 PROCEDURE — 99233 SBSQ HOSP IP/OBS HIGH 50: CPT | Performed by: FAMILY MEDICINE

## 2023-03-28 PROCEDURE — 83735 ASSAY OF MAGNESIUM: CPT | Performed by: FAMILY MEDICINE

## 2023-03-28 PROCEDURE — 250N000011 HC RX IP 250 OP 636: Performed by: INTERNAL MEDICINE

## 2023-03-28 PROCEDURE — 250N000011 HC RX IP 250 OP 636: Performed by: FAMILY MEDICINE

## 2023-03-28 PROCEDURE — 97530 THERAPEUTIC ACTIVITIES: CPT | Mod: GP

## 2023-03-28 PROCEDURE — 80048 BASIC METABOLIC PNL TOTAL CA: CPT | Performed by: FAMILY MEDICINE

## 2023-03-28 PROCEDURE — 250N000013 HC RX MED GY IP 250 OP 250 PS 637: Performed by: FAMILY MEDICINE

## 2023-03-28 RX ADMIN — BUSPIRONE HYDROCHLORIDE 15 MG: 15 TABLET ORAL at 08:15

## 2023-03-28 RX ADMIN — ACETAMINOPHEN 1000 MG: 500 TABLET ORAL at 08:07

## 2023-03-28 RX ADMIN — OXYCODONE HYDROCHLORIDE 5 MG: 5 TABLET ORAL at 22:26

## 2023-03-28 RX ADMIN — NYSTATIN: 100000 CREAM TOPICAL at 20:40

## 2023-03-28 RX ADMIN — LEVOTHYROXINE SODIUM 50 MCG: 50 TABLET ORAL at 08:15

## 2023-03-28 RX ADMIN — METRONIDAZOLE 500 MG: 500 TABLET ORAL at 20:39

## 2023-03-28 RX ADMIN — SERTRALINE HYDROCHLORIDE 150 MG: 100 TABLET ORAL at 08:17

## 2023-03-28 RX ADMIN — CEFDINIR 300 MG: 300 CAPSULE ORAL at 08:17

## 2023-03-28 RX ADMIN — CEFDINIR 300 MG: 300 CAPSULE ORAL at 20:39

## 2023-03-28 RX ADMIN — ACETAMINOPHEN 1000 MG: 500 TABLET ORAL at 20:58

## 2023-03-28 RX ADMIN — FUROSEMIDE 20 MG: 10 INJECTION, SOLUTION INTRAMUSCULAR; INTRAVENOUS at 08:09

## 2023-03-28 RX ADMIN — NYSTATIN: 100000 CREAM TOPICAL at 08:18

## 2023-03-28 RX ADMIN — PANTOPRAZOLE SODIUM 40 MG: 40 INJECTION, POWDER, FOR SOLUTION INTRAVENOUS at 08:13

## 2023-03-28 RX ADMIN — FENTANYL 1 PATCH: 12 PATCH, EXTENDED RELEASE TRANSDERMAL at 13:44

## 2023-03-28 RX ADMIN — METRONIDAZOLE 500 MG: 500 TABLET ORAL at 13:41

## 2023-03-28 RX ADMIN — METRONIDAZOLE 500 MG: 500 TABLET ORAL at 08:12

## 2023-03-28 RX ADMIN — FUROSEMIDE 20 MG: 10 INJECTION, SOLUTION INTRAMUSCULAR; INTRAVENOUS at 20:39

## 2023-03-28 RX ADMIN — DONEPEZIL HYDROCHLORIDE 10 MG: 10 TABLET ORAL at 20:57

## 2023-03-28 RX ADMIN — LOSARTAN POTASSIUM 50 MG: 50 TABLET, FILM COATED ORAL at 08:15

## 2023-03-28 RX ADMIN — ATORVASTATIN CALCIUM 60 MG: 20 TABLET, FILM COATED ORAL at 18:15

## 2023-03-28 RX ADMIN — TRAZODONE HYDROCHLORIDE 100 MG: 100 TABLET ORAL at 22:26

## 2023-03-28 RX ADMIN — ASPIRIN 81 MG CHEWABLE TABLET 81 MG: 81 TABLET CHEWABLE at 08:15

## 2023-03-28 RX ADMIN — BUSPIRONE HYDROCHLORIDE 15 MG: 15 TABLET ORAL at 23:59

## 2023-03-28 RX ADMIN — CLOPIDOGREL BISULFATE 75 MG: 75 TABLET ORAL at 08:15

## 2023-03-28 RX ADMIN — DONEPEZIL HYDROCHLORIDE 10 MG: 10 TABLET ORAL at 22:45

## 2023-03-28 RX ADMIN — BUSPIRONE HYDROCHLORIDE 15 MG: 15 TABLET ORAL at 18:15

## 2023-03-28 ASSESSMENT — ACTIVITIES OF DAILY LIVING (ADL)
ADLS_ACUITY_SCORE: 42

## 2023-03-28 NOTE — PROGRESS NOTES
Called into patients room by charge nurse and nursing assistant.      Nursing assistant stated she went to get patient some butter and when she returned, he looked like he was having a seizure and was unresponsive-total episode lasting about 15 seconds, slumping down into chair.      Upon entering room, patient awake and alert.  He states he does not recall the incident but feels dizzy.  Is a little slow to respond, but does not appear to be really any different from morning assessment.  He currently is upright in chair and states he is not hungry.  BP and pulse stable.  Pre-meal blood sugar 103.    Dr. Carl notified and is at bedside.  Will apply telemetry at this time.  Falls precautions in place.

## 2023-03-28 NOTE — PLAN OF CARE
Goal Outcome Evaluation:    DATE & TIME: 3/28/2023 [id number]    Cognitive Concerns/ Orientation : green   BEHAVIOR & AGGRESSION TOOL COLOR: alert, orientated to self and place. Needs reminders to date and full situation   ABNL VS/O2: stable, required 1 liter of oxygen nasal cannula to keep saturations above goal of 88%  MOBILITY: assist of one, tbelt and walker  PAIN MANAGMENT: medicated with tylenol for back pain, has chronic pain  DIET: as tolerated  BOWEL/BLADDER: continent/incontinent of bowel and bladder  ABNL LAB/BG: in epic  DRAIN/DEVICES: none  TELEMETRY RHYTHM: telemetry applied after episode this afternoon. Will call ICU for end of shift update  SKIN: groin red/rash, creams as ordered  TESTS/PROCEDURES: none  D/C DATE: to be determined

## 2023-03-28 NOTE — TELEPHONE ENCOUNTER
Patient requesting refill(s) of Fentanyl 12 mcg patches    Last dispensed from pharmacy on 3/10/23    Patient's last office/virtual visit by prescribing provider on 02/16/23  Next office/virtual appointment scheduled for 04/06/23    Last urine drug screen date 02/16/23  Current opioid agreement on file (completed within the last year) Yes Date of opioid agreement: 2/16/23    E-prescribe to [id number] - [place], [place]     Will route to nursing Eckert for review and preparation of prescription(s).
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
## 2023-03-28 NOTE — PROGRESS NOTES
Assist of one with gait belt and walker, alert but frequently disoriented to time and location, HR Irregular, lung sounds diminished,sats lowon 1/2Lwhen sleeping, increased to 1L to maintain oxygen saturations.   Groin/jaja area abnormal, using skin barrier

## 2023-03-28 NOTE — PROGRESS NOTES
Care Management Follow Up    Length of Stay (days): 9    Expected Discharge Date: 03/29/2023     Concerns to be Addressed: discharge planning     Patient plan of care discussed at interdisciplinary rounds: Yes    Anticipated Discharge Disposition:  [facility]     Anticipated Discharge Services:  Homecare PT/OT/RN/HHA  Anticipated Discharge DME:  Possible home O2    Patient/family educated on Medicare website which has current facility and service quality ratings: yes  Education Provided on the Discharge Plan:  yes  Patient/Family in Agreement with the Plan:  yes    Referrals Placed by CM/SW: Internal Clinic Care Coordination, Post Acute Facilities  Private pay costs discussed: Not applicable    Additional Information:    Per IDT rounds MD team states that patient is not medically stable for discharge today, likely discharge tomorrow.     Spoke with sonSanto, over the phone regarding discharge plans. Santo strongly prefers that patient return to his [facility] and not go to TCU. He states that patient has had bad knees and hips for years and his currently mobility is not that far off from baseline.     Spoke with JOSELYN Barragan at [facility] (Phone: [phone] Fax:[phone]). Per [place], they are able to accommodate patient as long as he is not Ax2.  They can provide Ax1 care.     Discussed home care options with Santo. Santo is in agreement. Patient has been accepted by [facility] (Phone: [phone]) for PT/OT/RN/HHA services.     JOSELYN Barragan at [facility] would like  to provide update on patient tomorrow AM as they will need to make O2 arrangements if patient discharges on O2.     SonSanto, will transport at discharge.     PLAN: Return to [facility] with [facility] PT/OT/RN/HHA      KARINA CORNEJO RN

## 2023-03-28 NOTE — TELEPHONE ENCOUNTER
Medication refill information reviewed. 3/19/2023 - present (9 days) [facility]    Due date for  Fentanyl 12 mcg patches is ?     Prescriptions prepped for review.     Will route to provider.

## 2023-03-28 NOTE — PLAN OF CARE
Reason for Admission: Colitis, polymicrobial bacteremia      Activity: Assist of one with gait belt and walker      Neuro: Patient alert but frequently disoriented to time and location. Patient reported flashbacks to [place] War starting around 1730 today.       Cardiac: Irregular         Resp: Clear, but diminished lung sounds O2: Goal to wean off of oxygen, but on 1/2L to maintain oxygen saturations.       GI/:  Incontinent. No complaints of nausea.      Skin: Groin/jaja area abnormal, using skin barrier      Lines/Drains: PIV SL         Vitals: Stable except oxygen saturation when oxygen off      Labs/BG: Potassium was 3.1, oral potassium administered per protocol. Will need a potassium and possibly magnesium redraw in the morning      Pain: Reported bilateral leg pain treated with oxycodone and acetaminophen      Plan: Discharge to [facility] on 3.28.2023. Plan to wean oxygen.

## 2023-03-29 ENCOUNTER — APPOINTMENT (OUTPATIENT)
Dept: PHYSICAL THERAPY | Facility: CLINIC | Age: 83
DRG: 871 | End: 2023-03-29
Payer: MEDICARE

## 2023-03-29 ENCOUNTER — APPOINTMENT (OUTPATIENT)
Dept: CARDIOLOGY | Facility: CLINIC | Age: 83
DRG: 871 | End: 2023-03-29
Attending: INTERNAL MEDICINE
Payer: MEDICARE

## 2023-03-29 ENCOUNTER — APPOINTMENT (OUTPATIENT)
Dept: OCCUPATIONAL THERAPY | Facility: CLINIC | Age: 83
DRG: 871 | End: 2023-03-29
Payer: MEDICARE

## 2023-03-29 LAB
ALBUMIN SERPL BCG-MCNC: 2.7 G/DL (ref 3.5–5.2)
ALP SERPL-CCNC: 80 U/L (ref 40–129)
ALT SERPL W P-5'-P-CCNC: 29 U/L (ref 10–50)
ANION GAP SERPL CALCULATED.3IONS-SCNC: 6 MMOL/L (ref 7–15)
AST SERPL W P-5'-P-CCNC: 46 U/L (ref 10–50)
BILIRUB SERPL-MCNC: 0.4 MG/DL
BUN SERPL-MCNC: 9.2 MG/DL (ref 8–23)
CALCIUM SERPL-MCNC: 9 MG/DL (ref 8.8–10.2)
CHLORIDE SERPL-SCNC: 101 MMOL/L (ref 98–107)
CREAT SERPL-MCNC: 0.66 MG/DL (ref 0.67–1.17)
DEPRECATED HCO3 PLAS-SCNC: 32 MMOL/L (ref 22–29)
ERYTHROCYTE [DISTWIDTH] IN BLOOD BY AUTOMATED COUNT: 16.1 % (ref 10–15)
GFR SERPL CREATININE-BSD FRML MDRD: >90 ML/MIN/1.73M2
GLUCOSE SERPL-MCNC: 90 MG/DL (ref 70–99)
HCT VFR BLD AUTO: 33.2 % (ref 40–53)
HGB BLD-MCNC: 10.3 G/DL (ref 13.3–17.7)
LVEF ECHO: NORMAL
MAGNESIUM SERPL-MCNC: 1.8 MG/DL (ref 1.7–2.3)
MCH RBC QN AUTO: 30.3 PG (ref 26.5–33)
MCHC RBC AUTO-ENTMCNC: 31 G/DL (ref 31.5–36.5)
MCV RBC AUTO: 98 FL (ref 78–100)
PHOSPHATE SERPL-MCNC: 2.6 MG/DL (ref 2.5–4.5)
PLATELET # BLD AUTO: 210 10E3/UL (ref 150–450)
POTASSIUM SERPL-SCNC: 3.5 MMOL/L (ref 3.4–5.3)
PROT SERPL-MCNC: 5.2 G/DL (ref 6.4–8.3)
RBC # BLD AUTO: 3.4 10E6/UL (ref 4.4–5.9)
SODIUM SERPL-SCNC: 139 MMOL/L (ref 136–145)
WBC # BLD AUTO: 7.3 10E3/UL (ref 4–11)

## 2023-03-29 PROCEDURE — 80053 COMPREHEN METABOLIC PANEL: CPT | Performed by: FAMILY MEDICINE

## 2023-03-29 PROCEDURE — 250N000013 HC RX MED GY IP 250 OP 250 PS 637: Performed by: INTERNAL MEDICINE

## 2023-03-29 PROCEDURE — 250N000011 HC RX IP 250 OP 636: Performed by: FAMILY MEDICINE

## 2023-03-29 PROCEDURE — 93306 TTE W/DOPPLER COMPLETE: CPT

## 2023-03-29 PROCEDURE — C9113 INJ PANTOPRAZOLE SODIUM, VIA: HCPCS | Performed by: INTERNAL MEDICINE

## 2023-03-29 PROCEDURE — 250N000011 HC RX IP 250 OP 636: Performed by: INTERNAL MEDICINE

## 2023-03-29 PROCEDURE — 83735 ASSAY OF MAGNESIUM: CPT | Performed by: FAMILY MEDICINE

## 2023-03-29 PROCEDURE — 36415 COLL VENOUS BLD VENIPUNCTURE: CPT | Performed by: FAMILY MEDICINE

## 2023-03-29 PROCEDURE — 97535 SELF CARE MNGMENT TRAINING: CPT | Mod: GO

## 2023-03-29 PROCEDURE — 85018 HEMOGLOBIN: CPT | Performed by: FAMILY MEDICINE

## 2023-03-29 PROCEDURE — 97530 THERAPEUTIC ACTIVITIES: CPT | Mod: GP

## 2023-03-29 PROCEDURE — 120N000001 HC R&B MED SURG/OB

## 2023-03-29 PROCEDURE — 84100 ASSAY OF PHOSPHORUS: CPT | Performed by: FAMILY MEDICINE

## 2023-03-29 PROCEDURE — 93306 TTE W/DOPPLER COMPLETE: CPT | Mod: 26 | Performed by: INTERNAL MEDICINE

## 2023-03-29 PROCEDURE — 250N000013 HC RX MED GY IP 250 OP 250 PS 637: Performed by: FAMILY MEDICINE

## 2023-03-29 PROCEDURE — 99232 SBSQ HOSP IP/OBS MODERATE 35: CPT | Performed by: INTERNAL MEDICINE

## 2023-03-29 RX ADMIN — CLOPIDOGREL BISULFATE 75 MG: 75 TABLET ORAL at 08:55

## 2023-03-29 RX ADMIN — METRONIDAZOLE 500 MG: 500 TABLET ORAL at 08:55

## 2023-03-29 RX ADMIN — ACETAMINOPHEN 1000 MG: 500 TABLET ORAL at 20:27

## 2023-03-29 RX ADMIN — TRAZODONE HYDROCHLORIDE 100 MG: 100 TABLET ORAL at 22:17

## 2023-03-29 RX ADMIN — CEFDINIR 300 MG: 300 CAPSULE ORAL at 22:18

## 2023-03-29 RX ADMIN — METRONIDAZOLE 500 MG: 500 TABLET ORAL at 15:29

## 2023-03-29 RX ADMIN — BUSPIRONE HYDROCHLORIDE 15 MG: 15 TABLET ORAL at 17:18

## 2023-03-29 RX ADMIN — LOSARTAN POTASSIUM 50 MG: 50 TABLET, FILM COATED ORAL at 08:55

## 2023-03-29 RX ADMIN — CEFDINIR 300 MG: 300 CAPSULE ORAL at 08:50

## 2023-03-29 RX ADMIN — ATORVASTATIN CALCIUM 60 MG: 20 TABLET, FILM COATED ORAL at 17:18

## 2023-03-29 RX ADMIN — NYSTATIN: 100000 CREAM TOPICAL at 20:27

## 2023-03-29 RX ADMIN — METRONIDAZOLE 500 MG: 500 TABLET ORAL at 20:27

## 2023-03-29 RX ADMIN — DONEPEZIL HYDROCHLORIDE 10 MG: 10 TABLET ORAL at 22:18

## 2023-03-29 RX ADMIN — FUROSEMIDE 20 MG: 10 INJECTION, SOLUTION INTRAMUSCULAR; INTRAVENOUS at 20:27

## 2023-03-29 RX ADMIN — HYDRALAZINE HYDROCHLORIDE 10 MG: 20 INJECTION INTRAMUSCULAR; INTRAVENOUS at 23:42

## 2023-03-29 RX ADMIN — BUSPIRONE HYDROCHLORIDE 15 MG: 15 TABLET ORAL at 08:55

## 2023-03-29 RX ADMIN — SERTRALINE HYDROCHLORIDE 150 MG: 100 TABLET ORAL at 08:55

## 2023-03-29 RX ADMIN — FUROSEMIDE 20 MG: 10 INJECTION, SOLUTION INTRAMUSCULAR; INTRAVENOUS at 08:51

## 2023-03-29 RX ADMIN — NYSTATIN: 100000 CREAM TOPICAL at 08:49

## 2023-03-29 RX ADMIN — BUSPIRONE HYDROCHLORIDE 15 MG: 15 TABLET ORAL at 23:42

## 2023-03-29 RX ADMIN — LEVOTHYROXINE SODIUM 50 MCG: 50 TABLET ORAL at 08:55

## 2023-03-29 RX ADMIN — ASPIRIN 81 MG CHEWABLE TABLET 81 MG: 81 TABLET CHEWABLE at 08:55

## 2023-03-29 RX ADMIN — PANTOPRAZOLE SODIUM 40 MG: 40 INJECTION, POWDER, FOR SOLUTION INTRAVENOUS at 08:54

## 2023-03-29 ASSESSMENT — ACTIVITIES OF DAILY LIVING (ADL)
ADLS_ACUITY_SCORE: 42
ADLS_ACUITY_SCORE: 40
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
## 2023-03-29 NOTE — PROGRESS NOTES
Pt having short rounds of non sustainded Vtach which resolve quickly MS charge RN notified. Dr. Preciado aware.

## 2023-03-29 NOTE — PROGRESS NOTES
Per ICU telemetry, patient goes in and out of Afib.  Heart rate controlled, and he goes from sinus rhythm to afib.  On standard magnesium and potassium replacement protocols.  Will update on-coming nurse to utilize their [employer] web console to update telemedicine.

## 2023-03-29 NOTE — PROGRESS NOTES
"Dr. Vinay Saha [facility] on-call notified of new onset of atrial fib with controlled rate. 'Continue to monitor.' BP (!) 140/61 (BP Location: Right arm)   Pulse 71   Temp 98.8  F (37.1  C) (Oral)   Resp 18   Ht 1.803 m (5' 11\")   Wt 99.2 kg (218 lb 11.1 oz)   SpO2 93%   BMI 30.50 kg/m     "

## 2023-03-30 ENCOUNTER — TELEPHONE (OUTPATIENT)
Dept: PALLIATIVE MEDICINE | Facility: CLINIC | Age: 83
End: 2023-03-30

## 2023-03-30 LAB
ANION GAP SERPL CALCULATED.3IONS-SCNC: 7 MMOL/L (ref 7–15)
BUN SERPL-MCNC: 7.9 MG/DL (ref 8–23)
CALCIUM SERPL-MCNC: 9.4 MG/DL (ref 8.8–10.2)
CHLORIDE SERPL-SCNC: 101 MMOL/L (ref 98–107)
CREAT SERPL-MCNC: 0.69 MG/DL (ref 0.67–1.17)
DEPRECATED HCO3 PLAS-SCNC: 33 MMOL/L (ref 22–29)
GFR SERPL CREATININE-BSD FRML MDRD: >90 ML/MIN/1.73M2
GLUCOSE SERPL-MCNC: 94 MG/DL (ref 70–99)
MAGNESIUM SERPL-MCNC: 1.8 MG/DL (ref 1.7–2.3)
POTASSIUM SERPL-SCNC: 3.4 MMOL/L (ref 3.4–5.3)
SODIUM SERPL-SCNC: 141 MMOL/L (ref 136–145)

## 2023-03-30 PROCEDURE — 83735 ASSAY OF MAGNESIUM: CPT | Performed by: FAMILY MEDICINE

## 2023-03-30 PROCEDURE — 120N000001 HC R&B MED SURG/OB

## 2023-03-30 PROCEDURE — 250N000013 HC RX MED GY IP 250 OP 250 PS 637: Performed by: INTERNAL MEDICINE

## 2023-03-30 PROCEDURE — 99232 SBSQ HOSP IP/OBS MODERATE 35: CPT | Performed by: INTERNAL MEDICINE

## 2023-03-30 PROCEDURE — 250N000013 HC RX MED GY IP 250 OP 250 PS 637: Performed by: FAMILY MEDICINE

## 2023-03-30 PROCEDURE — 36415 COLL VENOUS BLD VENIPUNCTURE: CPT | Performed by: INTERNAL MEDICINE

## 2023-03-30 PROCEDURE — C9113 INJ PANTOPRAZOLE SODIUM, VIA: HCPCS | Performed by: INTERNAL MEDICINE

## 2023-03-30 PROCEDURE — 250N000011 HC RX IP 250 OP 636: Performed by: INTERNAL MEDICINE

## 2023-03-30 PROCEDURE — 250N000011 HC RX IP 250 OP 636: Performed by: FAMILY MEDICINE

## 2023-03-30 PROCEDURE — 80048 BASIC METABOLIC PNL TOTAL CA: CPT | Performed by: INTERNAL MEDICINE

## 2023-03-30 RX ADMIN — CLOPIDOGREL BISULFATE 75 MG: 75 TABLET ORAL at 08:14

## 2023-03-30 RX ADMIN — METRONIDAZOLE 500 MG: 500 TABLET ORAL at 21:05

## 2023-03-30 RX ADMIN — NYSTATIN: 100000 CREAM TOPICAL at 08:13

## 2023-03-30 RX ADMIN — BUSPIRONE HYDROCHLORIDE 15 MG: 15 TABLET ORAL at 17:04

## 2023-03-30 RX ADMIN — TRAZODONE HYDROCHLORIDE 100 MG: 100 TABLET ORAL at 21:09

## 2023-03-30 RX ADMIN — LOSARTAN POTASSIUM 50 MG: 50 TABLET, FILM COATED ORAL at 08:20

## 2023-03-30 RX ADMIN — SERTRALINE HYDROCHLORIDE 150 MG: 100 TABLET ORAL at 08:15

## 2023-03-30 RX ADMIN — FUROSEMIDE 20 MG: 10 INJECTION, SOLUTION INTRAMUSCULAR; INTRAVENOUS at 21:05

## 2023-03-30 RX ADMIN — FUROSEMIDE 20 MG: 10 INJECTION, SOLUTION INTRAMUSCULAR; INTRAVENOUS at 08:22

## 2023-03-30 RX ADMIN — ONDANSETRON 4 MG: 2 INJECTION INTRAMUSCULAR; INTRAVENOUS at 08:47

## 2023-03-30 RX ADMIN — METRONIDAZOLE 500 MG: 500 TABLET ORAL at 13:08

## 2023-03-30 RX ADMIN — DONEPEZIL HYDROCHLORIDE 10 MG: 10 TABLET ORAL at 21:09

## 2023-03-30 RX ADMIN — PANTOPRAZOLE SODIUM 40 MG: 40 INJECTION, POWDER, FOR SOLUTION INTRAVENOUS at 08:15

## 2023-03-30 RX ADMIN — CEFDINIR 300 MG: 300 CAPSULE ORAL at 21:05

## 2023-03-30 RX ADMIN — NYSTATIN: 100000 CREAM TOPICAL at 21:04

## 2023-03-30 RX ADMIN — METRONIDAZOLE 500 MG: 500 TABLET ORAL at 08:14

## 2023-03-30 RX ADMIN — LEVOTHYROXINE SODIUM 50 MCG: 50 TABLET ORAL at 08:13

## 2023-03-30 RX ADMIN — FENTANYL 1 PATCH: 12 PATCH, EXTENDED RELEASE TRANSDERMAL at 13:07

## 2023-03-30 RX ADMIN — ATORVASTATIN CALCIUM 60 MG: 20 TABLET, FILM COATED ORAL at 17:04

## 2023-03-30 RX ADMIN — ASPIRIN 81 MG CHEWABLE TABLET 81 MG: 81 TABLET CHEWABLE at 08:14

## 2023-03-30 RX ADMIN — BUSPIRONE HYDROCHLORIDE 15 MG: 15 TABLET ORAL at 08:14

## 2023-03-30 ASSESSMENT — ACTIVITIES OF DAILY LIVING (ADL)
ADLS_ACUITY_SCORE: 40

## 2023-03-30 NOTE — TELEPHONE ENCOUNTER
This is a duplicate encounter. He is inpatient right now and I am waiting to process this refill. 3/19/2023 - present (11 days) [facility].    SHENA Torres, RN  Care Coordinator  [facility] [place]

## 2023-03-30 NOTE — PROGRESS NOTES
Neuro: Alert and oriented to self and place. Forgetful  Cardiac: On tele, NSR w/ PVCs. Hydralazine given X1 for .  Respiratory: SOBE.   GI/: Intermittently incontinent of bowel and bladder. Continues to have frequent loose stools.   Mobility: A1 GB walker. Fatigues easily   Diet: Reg    Pain: Pain in neck and back, PRN tylenol given.   Skin: Nadya area and groin are reddened, nystatin cream applied.  LDA: PIV SL.     VS: VSS on RA. Afebrile.    Labs: On K, mg,  protocols.
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
## 2023-03-31 VITALS
DIASTOLIC BLOOD PRESSURE: 73 MMHG | RESPIRATION RATE: 16 BRPM | OXYGEN SATURATION: 90 % | HEART RATE: 77 BPM | SYSTOLIC BLOOD PRESSURE: 146 MMHG | WEIGHT: 204.37 LBS | HEIGHT: 71 IN | TEMPERATURE: 98.7 F | BODY MASS INDEX: 28.61 KG/M2

## 2023-03-31 LAB
ANION GAP SERPL CALCULATED.3IONS-SCNC: 8 MMOL/L (ref 7–15)
BASOPHILS # BLD AUTO: 0 10E3/UL (ref 0–0.2)
BASOPHILS NFR BLD AUTO: 0 %
BUN SERPL-MCNC: 12.1 MG/DL (ref 8–23)
CALCIUM SERPL-MCNC: 9.2 MG/DL (ref 8.8–10.2)
CHLORIDE SERPL-SCNC: 103 MMOL/L (ref 98–107)
CREAT SERPL-MCNC: 0.7 MG/DL (ref 0.67–1.17)
DEPRECATED HCO3 PLAS-SCNC: 32 MMOL/L (ref 22–29)
EOSINOPHIL # BLD AUTO: 0.1 10E3/UL (ref 0–0.7)
EOSINOPHIL NFR BLD AUTO: 1 %
ERYTHROCYTE [DISTWIDTH] IN BLOOD BY AUTOMATED COUNT: 16.2 % (ref 10–15)
GFR SERPL CREATININE-BSD FRML MDRD: >90 ML/MIN/1.73M2
GLUCOSE SERPL-MCNC: 95 MG/DL (ref 70–99)
HCT VFR BLD AUTO: 35.6 % (ref 40–53)
HGB BLD-MCNC: 10.8 G/DL (ref 13.3–17.7)
IMM GRANULOCYTES # BLD: 0.1 10E3/UL
IMM GRANULOCYTES NFR BLD: 1 %
LYMPHOCYTES # BLD AUTO: 1.2 10E3/UL (ref 0.8–5.3)
LYMPHOCYTES NFR BLD AUTO: 15 %
MAGNESIUM SERPL-MCNC: 1.9 MG/DL (ref 1.7–2.3)
MCH RBC QN AUTO: 30.1 PG (ref 26.5–33)
MCHC RBC AUTO-ENTMCNC: 30.3 G/DL (ref 31.5–36.5)
MCV RBC AUTO: 99 FL (ref 78–100)
MONOCYTES # BLD AUTO: 0.8 10E3/UL (ref 0–1.3)
MONOCYTES NFR BLD AUTO: 10 %
NEUTROPHILS # BLD AUTO: 5.7 10E3/UL (ref 1.6–8.3)
NEUTROPHILS NFR BLD AUTO: 73 %
NRBC # BLD AUTO: 0 10E3/UL
NRBC BLD AUTO-RTO: 0 /100
PLATELET # BLD AUTO: 234 10E3/UL (ref 150–450)
POTASSIUM SERPL-SCNC: 3.6 MMOL/L (ref 3.4–5.3)
RBC # BLD AUTO: 3.59 10E6/UL (ref 4.4–5.9)
SODIUM SERPL-SCNC: 143 MMOL/L (ref 136–145)
WBC # BLD AUTO: 7.8 10E3/UL (ref 4–11)

## 2023-03-31 PROCEDURE — 83735 ASSAY OF MAGNESIUM: CPT | Performed by: INTERNAL MEDICINE

## 2023-03-31 PROCEDURE — 80048 BASIC METABOLIC PNL TOTAL CA: CPT | Performed by: INTERNAL MEDICINE

## 2023-03-31 PROCEDURE — 250N000011 HC RX IP 250 OP 636: Performed by: INTERNAL MEDICINE

## 2023-03-31 PROCEDURE — C9113 INJ PANTOPRAZOLE SODIUM, VIA: HCPCS | Performed by: INTERNAL MEDICINE

## 2023-03-31 PROCEDURE — 85025 COMPLETE CBC W/AUTO DIFF WBC: CPT | Performed by: INTERNAL MEDICINE

## 2023-03-31 PROCEDURE — 250N000013 HC RX MED GY IP 250 OP 250 PS 637: Performed by: INTERNAL MEDICINE

## 2023-03-31 PROCEDURE — 36415 COLL VENOUS BLD VENIPUNCTURE: CPT | Performed by: INTERNAL MEDICINE

## 2023-03-31 PROCEDURE — 250N000011 HC RX IP 250 OP 636: Performed by: FAMILY MEDICINE

## 2023-03-31 PROCEDURE — 99239 HOSP IP/OBS DSCHRG MGMT >30: CPT | Performed by: INTERNAL MEDICINE

## 2023-03-31 PROCEDURE — 250N000013 HC RX MED GY IP 250 OP 250 PS 637: Performed by: FAMILY MEDICINE

## 2023-03-31 RX ORDER — FENTANYL 12.5 UG/1
1 PATCH TRANSDERMAL
Qty: 15 PATCH | Refills: 0 | Status: SHIPPED | OUTPATIENT
Start: 2023-03-31 | End: 2023-04-06

## 2023-03-31 RX ORDER — LOPERAMIDE HCL 2 MG
2 CAPSULE ORAL 4 TIMES DAILY PRN
COMMUNITY
Start: 2023-03-31 | End: 2023-07-24

## 2023-03-31 RX ADMIN — BUSPIRONE HYDROCHLORIDE 15 MG: 15 TABLET ORAL at 00:12

## 2023-03-31 RX ADMIN — LEVOTHYROXINE SODIUM 50 MCG: 50 TABLET ORAL at 07:55

## 2023-03-31 RX ADMIN — LOSARTAN POTASSIUM 50 MG: 50 TABLET, FILM COATED ORAL at 07:55

## 2023-03-31 RX ADMIN — CEFDINIR 300 MG: 300 CAPSULE ORAL at 07:55

## 2023-03-31 RX ADMIN — NYSTATIN: 100000 CREAM TOPICAL at 07:56

## 2023-03-31 RX ADMIN — PANTOPRAZOLE SODIUM 40 MG: 40 INJECTION, POWDER, FOR SOLUTION INTRAVENOUS at 07:54

## 2023-03-31 RX ADMIN — METRONIDAZOLE 500 MG: 500 TABLET ORAL at 07:55

## 2023-03-31 RX ADMIN — SERTRALINE HYDROCHLORIDE 150 MG: 100 TABLET ORAL at 07:55

## 2023-03-31 RX ADMIN — BUSPIRONE HYDROCHLORIDE 15 MG: 15 TABLET ORAL at 07:55

## 2023-03-31 RX ADMIN — CLOPIDOGREL BISULFATE 75 MG: 75 TABLET ORAL at 07:55

## 2023-03-31 RX ADMIN — FUROSEMIDE 20 MG: 10 INJECTION, SOLUTION INTRAMUSCULAR; INTRAVENOUS at 07:54

## 2023-03-31 RX ADMIN — ASPIRIN 81 MG CHEWABLE TABLET 81 MG: 81 TABLET CHEWABLE at 07:55

## 2023-03-31 ASSESSMENT — ACTIVITIES OF DAILY LIVING (ADL)
ADLS_ACUITY_SCORE: 38
ADLS_ACUITY_SCORE: 38
ADLS_ACUITY_SCORE: 40
ADLS_ACUITY_SCORE: 42

## 2023-03-31 NOTE — DISCHARGE SUMMARY
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 NO REFILLS REMAIN. THANK YOU!  Associated Diagnoses: Recurrent major depressive disorder, in partial remission (H)      traZODone (DESYREL) 100 MG tablet Take 100 mg by mouth At Bedtime      VITAMIN D3 50 MCG (2000 UT) tablet Take 2 tablets by mouth daily      acetaminophen (TYLENOL) 500 MG tablet Take 1-2 tablets (500-1,000 mg) by mouth every 8 hours as needed for mild pain  Qty: 30 tablet, Refills: 0    Associated Diagnoses: Chronic bilateral thoracic back pain      nitroGLYcerin (NITROSTAT) 0.4 MG sublingual tablet PLACE 1 TAB UNDER TONGUE EVERY 5min AS NEEDED FOR CHEST PAIN MAX 3 TABS/EPISODE  Qty: 25 tablet, Refills: 11    Comments: PLEASE AUTHORIZE NEW ORDERS, OR DC ORDER. THANK YOU.  Associated Diagnoses: Stable angina (H)      polyethylene glycol (MIRALAX) 17 GM/Dose powder Take as  directed for colonoscopy prep  Qty: 255 g, Refills: 0    Associated Diagnoses: Rectal bleeding         STOP taking these medications       oxyCODONE (ROXICODONE) 5 MG tablet Comments:   Reason for Stopping:             Allergies   Allergies   Allergen Reactions     Penicillins Difficulty breathing

## 2023-03-31 NOTE — PROGRESS NOTES
Neuro: Pt alert but disoriented to time this shift.  Diet. As tolerated.   Air: 1LPM. Sats dropped to 88% when asleep last night.   Lungs: Infrequent cough noted with diminished lung sounds bilaterally.  Skin: Circular rashes noted to chest/abdomen below nipple line. Redness to groin and buttocks  LDAs: Saline locked PIV  GI: Loose stools x4 this shift  Pain: Pt denies pain at this time.  Mobility: Pt transfers assist of one with gait belt and walker

## 2023-03-31 NOTE — TELEPHONE ENCOUNTER
Please remind Osvaldo's family to call 5-7 business days before refills are needed.     Script Eprescribed to pharmacy    Signed Prescriptions:                        Disp   Refills    fentaNYL (DURAGESIC) 12 mcg/hr 72 hr patch 15 pat*0        Sig: Place 1 patch onto the skin every 48 hours remove old           patch. Ok to fill/start 03/31/23  Authorizing Provider: LUCIAN HOPPER APRN, NP-C  [facility] [place]

## 2023-03-31 NOTE — PROGRESS NOTES
Care Management Discharge Note    Discharge Date: 03/31/2023     Discharge Disposition:  [facility]    Discharge Services:  Home care RN/PT/OT/HHA    Discharge DME:  none    Discharge Transportation:  [facility]    Private pay costs discussed: Not applicable    PAS Confirmation Code: [id number]  Patient/family educated on Medicare website which has current facility and service quality ratings: yes    Education Provided on the Discharge Plan:  yes  Persons Notified of Discharge Plans: Laurel Aquino RN at [facility]  Patient/Family in Agreement with the Plan:  yes    Handoff Referral Completed: Yes    Additional Information:    Per IDT rounds, MD team states that patient is medically stable for discharge today.     Patient will return to [facility] (Phone: [phone] Fax:[phone]) with [facility] (Phone: [phone]) PT/OT/RN/HHA.     Called JOSELYN Barragan at [facility] and provided discharge update.     Santo Aquino, will transport.     PLAN: Return to [facility] with  PT/OT/RN/HHA    KARINA CORNEJO RN

## 2023-03-31 NOTE — PLAN OF CARE
Physical Therapy Discharge Summary    Reason for therapy discharge:    Discharged to home with home therapy.    Progress towards therapy goal(s). See goals on Care Plan in [facility] electronic health record for goal details.  Goals partially met.  Barriers to achieving goals:   discharge from facility.    Therapy recommendation(s):    Continued therapy is recommended.  Rationale/Recommendations:  Recommend continued home care PT to increase indep and safety in own environment. long term will provide Ax1 for mobility.

## 2023-03-31 NOTE — PLAN OF CARE
Occupational Therapy Discharge Summary    Reason for therapy discharge:    Discharged to home with home therapy.    Progress towards therapy goal(s). See goals on Care Plan in [facility] electronic health record for goal details.  Goals partially met.  Barriers to achieving goals:   discharge from facility.    Therapy recommendation(s):    Continued therapy is recommended.  Rationale/Recommendations:  Recommend HC OT at [facility] to increase safety and independence in home environment. [facility] will provide assist x1 for mobility.

## 2023-03-31 NOTE — PROGRESS NOTES
[facility] [facility] DISCHARGE NOTE    Patient discharged to assisted living at 11:23 AM via wheel chair. Accompanied by son and staff. Discharge instructions reviewed with patient and son, opportunity offered to ask questions. Prescriptions - None ordered for discharge. OTC loperamide ordered PRN. All belongings sent with patient.  Report given to AL facility after leaving department. RN spoke with Joy at the facility to update her.     Sudha Clark RN

## 2023-03-31 NOTE — TELEPHONE ENCOUNTER
3/19/2023 - 3/31/2023 (12 days) [facility]    Due date for  Fentanyl 12 mcg patches is 03/31/23     Prescriptions prepped for review.      Will route to provider.

## 2023-04-03 ENCOUNTER — TELEPHONE (OUTPATIENT)
Dept: FAMILY MEDICINE | Facility: CLINIC | Age: 83
End: 2023-04-03
Payer: MEDICARE

## 2023-04-03 ASSESSMENT — PAIN SCALES - PAIN ENJOYMENT GENERAL ACTIVITY SCALE (PEG): PEG_TOTALSCORE: INCOMPLETE

## 2023-04-03 ASSESSMENT — ANXIETY QUESTIONNAIRES
2. NOT BEING ABLE TO STOP OR CONTROL WORRYING: NOT AT ALL
GAD7 TOTAL SCORE: 0
6. BECOMING EASILY ANNOYED OR IRRITABLE: NOT AT ALL
3. WORRYING TOO MUCH ABOUT DIFFERENT THINGS: NOT AT ALL
8. IF YOU CHECKED OFF ANY PROBLEMS, HOW DIFFICULT HAVE THESE MADE IT FOR YOU TO DO YOUR WORK, TAKE CARE OF THINGS AT HOME, OR GET ALONG WITH OTHER PEOPLE?: NOT DIFFICULT AT ALL
7. FEELING AFRAID AS IF SOMETHING AWFUL MIGHT HAPPEN: NOT AT ALL
5. BEING SO RESTLESS THAT IT IS HARD TO SIT STILL: NOT AT ALL
GAD7 TOTAL SCORE: 0
4. TROUBLE RELAXING: NOT AT ALL
1. FEELING NERVOUS, ANXIOUS, OR ON EDGE: NOT AT ALL
IF YOU CHECKED OFF ANY PROBLEMS ON THIS QUESTIONNAIRE, HOW DIFFICULT HAVE THESE PROBLEMS MADE IT FOR YOU TO DO YOUR WORK, TAKE CARE OF THINGS AT HOME, OR GET ALONG WITH OTHER PEOPLE: NOT DIFFICULT AT ALL
7. FEELING AFRAID AS IF SOMETHING AWFUL MIGHT HAPPEN: NOT AT ALL

## 2023-04-03 NOTE — TELEPHONE ENCOUNTER
Call from Rosie with Home Care  Call back: [phone]    [facility] patient was admitted to the hospital for GI symptoms - Norovirus   Patient admitted to home care over the weekend due to deconditioning     Requesting verbal orders for the following:   PT eval and treat  Home health aide 1x a week for 4 weeks  Nurse visit 2x a week for 1 week  1x a week for 2 weeks   Every other week     Verbal orders provided   Rosie verbalized understanding  No further questions/concerns    Kike Flynn RN

## 2023-04-04 ENCOUNTER — OFFICE VISIT (OUTPATIENT)
Dept: FAMILY MEDICINE | Facility: CLINIC | Age: 83
End: 2023-04-04
Payer: MEDICARE

## 2023-04-04 VITALS
BODY MASS INDEX: 26.92 KG/M2 | DIASTOLIC BLOOD PRESSURE: 62 MMHG | SYSTOLIC BLOOD PRESSURE: 122 MMHG | HEART RATE: 67 BPM | WEIGHT: 193 LBS | TEMPERATURE: 97.7 F | OXYGEN SATURATION: 94 %

## 2023-04-04 DIAGNOSIS — G30.9 MAJOR NEUROCOGNITIVE DISORDER DUE TO ALZHEIMER'S DISEASE (H): ICD-10-CM

## 2023-04-04 DIAGNOSIS — C61 MALIGNANT TUMOR OF PROSTATE (H): ICD-10-CM

## 2023-04-04 DIAGNOSIS — F02.80 MAJOR NEUROCOGNITIVE DISORDER DUE TO ALZHEIMER'S DISEASE (H): ICD-10-CM

## 2023-04-04 DIAGNOSIS — Z86.19 HISTORY OF SEPSIS: Primary | ICD-10-CM

## 2023-04-04 DIAGNOSIS — I25.718 CORONARY ARTERY DISEASE OF AUTOLOGOUS VEIN BYPASS GRAFT WITH STABLE ANGINA PECTORIS (H): ICD-10-CM

## 2023-04-04 DIAGNOSIS — F11.90 CHRONIC, CONTINUOUS USE OF OPIOIDS: Chronic | ICD-10-CM

## 2023-04-04 DIAGNOSIS — I10 BENIGN ESSENTIAL HYPERTENSION: Chronic | ICD-10-CM

## 2023-04-04 LAB
ANION GAP SERPL CALCULATED.3IONS-SCNC: 10 MMOL/L (ref 7–15)
BUN SERPL-MCNC: 8.9 MG/DL (ref 8–23)
CALCIUM SERPL-MCNC: 9.4 MG/DL (ref 8.8–10.2)
CHLORIDE SERPL-SCNC: 101 MMOL/L (ref 98–107)
CREAT SERPL-MCNC: 0.71 MG/DL (ref 0.67–1.17)
DEPRECATED HCO3 PLAS-SCNC: 28 MMOL/L (ref 22–29)
ERYTHROCYTE [DISTWIDTH] IN BLOOD BY AUTOMATED COUNT: 15.8 % (ref 10–15)
GFR SERPL CREATININE-BSD FRML MDRD: 90 ML/MIN/1.73M2
GLUCOSE SERPL-MCNC: 98 MG/DL (ref 70–99)
HCT VFR BLD AUTO: 37.2 % (ref 40–53)
HGB BLD-MCNC: 11.2 G/DL (ref 13.3–17.7)
MCH RBC QN AUTO: 29.8 PG (ref 26.5–33)
MCHC RBC AUTO-ENTMCNC: 30.1 G/DL (ref 31.5–36.5)
MCV RBC AUTO: 99 FL (ref 78–100)
PLATELET # BLD AUTO: 287 10E3/UL (ref 150–450)
POTASSIUM SERPL-SCNC: 4 MMOL/L (ref 3.4–5.3)
RBC # BLD AUTO: 3.76 10E6/UL (ref 4.4–5.9)
SODIUM SERPL-SCNC: 139 MMOL/L (ref 136–145)
WBC # BLD AUTO: 6.1 10E3/UL (ref 4–11)

## 2023-04-04 PROCEDURE — 99495 TRANSJ CARE MGMT MOD F2F 14D: CPT | Performed by: FAMILY MEDICINE

## 2023-04-04 PROCEDURE — 36415 COLL VENOUS BLD VENIPUNCTURE: CPT | Performed by: FAMILY MEDICINE

## 2023-04-04 PROCEDURE — 85027 COMPLETE CBC AUTOMATED: CPT | Performed by: FAMILY MEDICINE

## 2023-04-04 PROCEDURE — 80048 BASIC METABOLIC PNL TOTAL CA: CPT | Performed by: FAMILY MEDICINE

## 2023-04-05 ENCOUNTER — TELEPHONE (OUTPATIENT)
Dept: FAMILY MEDICINE | Facility: CLINIC | Age: 83
End: 2023-04-05
Payer: MEDICARE

## 2023-04-05 NOTE — TELEPHONE ENCOUNTER
Reason for Call:  Other call back    Detailed comments: Sudha from [facility] called and needs verbal orders from Bunny Flores at [facility]    1)  Physical Therapy 1 x a week for 2 weeks  Strengh; endurance and balance  2)  Every other week 3 additional visits.    Please contact her back.  Thank you.    Phone Number Patient can be reached at: Other phone number:  [phone]*    Best Time: any    Can we leave a detailed message on this number? YES    Call taken on 4/5/2023 at 2:34 PM by Maria Del Carmen Zee

## 2023-04-05 NOTE — TELEPHONE ENCOUNTER
Call placed to Sabrina  Verbal orders given for  1)  Physical Therapy 1 x a week for 2 weeks  Strengh; endurance and balance  2)  Every other week 3 additional visits  Arun Wade RN

## 2023-04-06 ENCOUNTER — OFFICE VISIT (OUTPATIENT)
Dept: PALLIATIVE MEDICINE | Facility: OTHER | Age: 83
End: 2023-04-06
Payer: MEDICARE

## 2023-04-06 ENCOUNTER — TELEPHONE (OUTPATIENT)
Dept: FAMILY MEDICINE | Facility: CLINIC | Age: 83
End: 2023-04-06
Payer: MEDICARE

## 2023-04-06 VITALS
WEIGHT: 193 LBS | DIASTOLIC BLOOD PRESSURE: 70 MMHG | BODY MASS INDEX: 26.92 KG/M2 | OXYGEN SATURATION: 92 % | SYSTOLIC BLOOD PRESSURE: 150 MMHG | HEART RATE: 80 BPM

## 2023-04-06 DIAGNOSIS — M54.6 CHRONIC BILATERAL THORACIC BACK PAIN: ICD-10-CM

## 2023-04-06 DIAGNOSIS — G89.29 CHRONIC INTRACTABLE PAIN: Primary | ICD-10-CM

## 2023-04-06 DIAGNOSIS — G89.29 CHRONIC RADICULAR CERVICAL PAIN: ICD-10-CM

## 2023-04-06 DIAGNOSIS — M54.12 CHRONIC RADICULAR CERVICAL PAIN: ICD-10-CM

## 2023-04-06 DIAGNOSIS — G62.9 PERIPHERAL POLYNEUROPATHY: ICD-10-CM

## 2023-04-06 DIAGNOSIS — M54.50 CHRONIC BILATERAL LOW BACK PAIN, UNSPECIFIED WHETHER SCIATICA PRESENT: ICD-10-CM

## 2023-04-06 DIAGNOSIS — G89.29 CHRONIC BILATERAL THORACIC BACK PAIN: ICD-10-CM

## 2023-04-06 DIAGNOSIS — G89.29 CHRONIC BILATERAL LOW BACK PAIN, UNSPECIFIED WHETHER SCIATICA PRESENT: ICD-10-CM

## 2023-04-06 DIAGNOSIS — M15.3 POST-TRAUMATIC OSTEOARTHRITIS OF MULTIPLE JOINTS: ICD-10-CM

## 2023-04-06 PROCEDURE — 99215 OFFICE O/P EST HI 40 MIN: CPT | Performed by: NURSE PRACTITIONER

## 2023-04-06 PROCEDURE — G0463 HOSPITAL OUTPT CLINIC VISIT: HCPCS | Performed by: NURSE PRACTITIONER

## 2023-04-06 RX ORDER — FENTANYL 12.5 UG/1
1 PATCH TRANSDERMAL
Qty: 15 PATCH | Refills: 0 | Status: SHIPPED | OUTPATIENT
Start: 2023-04-06 | End: 2023-05-04

## 2023-04-06 RX ORDER — GABAPENTIN 100 MG/1
CAPSULE ORAL
Qty: 180 CAPSULE | Refills: 1 | Status: SHIPPED | OUTPATIENT
Start: 2023-04-06 | End: 2023-05-26

## 2023-04-06 RX ORDER — OXYCODONE HYDROCHLORIDE 5 MG/1
5 TABLET ORAL EVERY 4 HOURS PRN
Qty: 120 TABLET | Refills: 0 | Status: SHIPPED | OUTPATIENT
Start: 2023-04-06 | End: 2023-04-13

## 2023-04-06 ASSESSMENT — PAIN SCALES - GENERAL: PAINLEVEL: MILD PAIN (2)

## 2023-04-06 NOTE — PROGRESS NOTES
Patient presents to the clinic today for a follow up with ALPESH Garrido CNP regarding Pain Management.          4/6/2023     1:04 PM   PEG Score   PEG Total Score 0.67       UDS/CSA- 02.16.2023    Medications- Fentanyl      QUESTIONS:    Jazmine OLEA [facility] Visit Facilitator

## 2023-04-06 NOTE — PROGRESS NOTES
[facility]    CHIEF COMPLAINT: Chronic Pain.    INTERVAL HISTORY:  Last seen on 2/16/23.       Recommendations/plan at the last visit included:  1. 30 minutes Video or Clinic follow-up with ALPESH Tucker NP-C in 6 weeks  or sooner as needed.   2. Labs: Urine drug screen   3. Procedures recommended: We may consider doing an SI joint injection, the area between the sacrum and the illiac crest of the hip on the right side  4. Medication Management :   1. STOP Xtampsa as soon as the Fentanyl patch is available.   2. Fentanyl patch 12.5 mcg patch. Change patch every 48 hrs.   3. Increase Oxycodone 5 mg to three (3) full tabs per day.     Since last visit:   - Baseline pain improved with long acting pain medication. Has some breakthrough with activity that can increase pain to 8/10, but goes back down if he rests.   - Has pain, N/T sensation that causes him to move his legs and tap feet on the floor to relieve.   - Recently hospitalized with Noro-virus, doing better now.     Pain Information today: Mild Pain (2)/10. Location of pain: multiple areas of pain     Annual Requirements last collected: February 16, 2023     Current Pain Relevant Medications:    Acetaminophen 500 mg 1-2 tabs q 8 h PRN  Buspar 15 mg TID   Diclofenac gel 1% BID PRN   Gabapentin 100 mg: Starting titration up to 300 mg TID.   Trazodone 100 mg at HS      Current Controlled Substance Medications:   Fentanyl 12 mcg change patch q48h = 30 MME/day  Oxycodone IR 5 mg QID PRN  Max 30MME/day              Total opiate dose = 60 MME/day      Previous Pain Relevant Medications: (H--helped; HI--Helped initially; SWH--Somewhat helpful; NH--No help; W--worse; SE--side effects; ?--Unsure if helpful)   Opiates: Oxycodone:H, Fentanyl:?,   NSAIDS:   Migraine medications:   Muscle Relaxants:   Neuropathics:   Anti-depressants for pain:         Anxiety medications: Buspar:H,           Topicals: diclofenac:SW  OTC medications:    Sleep Medications: Trazodone:H  Other medications not covered above: Vitamin D: H     SUBSTANCE HISTORY:   Past or current illegal drug use: Denies   Past or current ETOH use: Past   Nicotine/tobacco use: none   Daily Caffeine intake: 2-3 day     CURRENT FAMILY/SOCIAL SITUATION:  Past/Present occupation: Retired, was in the  then    Housing status: Assisted family   Emotional/Physical support: wife, sons  Safety Concerns: falls risk  Current stressors: denies      THE 4 As OF OPIOID MAINTENANCE ANALGESIA    Analgesia: Is pain relief clinically significant? YES   Activity: Is patient functional and able to perform Activities of Daily Living? YES   Adverse effects: Is patient free from adverse side effects from opiates? YES   Adherence to Rx protocol: Is patient adhering to Controlled Substance Agreement and taking medications ONLY as ordered? YES     Is Narcan prescribed for opiate use >50 MME daily or concurrent use of opiates and benzodiazepines? NO    [place] Board of Pharmacy Data Base Reviewed:    YES; No concern for abuse or misuse of controlled medications based on this report. Reviewed [facility] April 5, 2023- no concerning fills.      PHYSICAL EXAM    Vitals:    04/06/23 1257   BP: (!) 150/70   Pulse: 80   SpO2: 92%   Weight: 87.5 kg (193 lb)       Constitutional: healthy, alert and no distress A&O.   Patient is appropriate.  Psychiatric/mental status: Alert, without lethargy or stupor. Appropriate affect.     DIRE Score for ongoing opioid management is calculated as follows:    Diagnosis = 3 pts (advanced condition; severe pain/objective findings)    Intractability = 3 pts (patient fully engaged but inadequate response to treatments)    Risk        Psych = 1 pt (serious personality dysfunction/mental illness that significantly interferes with care)         Chem Hlth = 3 pts (no history of chemical dependency; not drug-focused)       Reliability = 3 pts (highly reliable with meds,  appointments, treatments)       Social = 2 pts (reduction in some relationships/life rolls)       (Psych + Chem hlth + Reliability + Social) = 15    Efficacy = 1 pt (poor function; minimal pain relief despite mod/high med dose)    DIRE Score = 16        7-13: likely NOT suitable candidate for long-term opioid analgesia       14-21: may be a suitable candidate for long-term opioid analgesia     DIAGNOSTIC RESULTS:     No recent imaging has been done.      PAIN RELAVENT CONDITIONS:   1.  Post traumatic osteoarthritis multiple joints   2.  Chronic low back pain, mid back pain   3. Bilateral SI joint pain R>L   4.  Uncomplicated long term use of opiates.       DIAGNOSIS AND PLAN:     (G89.29) Chronic intractable pain  (primary encounter diagnosis)  (M54.6,  G89.29) Chronic bilateral thoracic back pain  (M15.3) Post-traumatic osteoarthritis of multiple joints  (M54.50,  G89.29) Chronic bilateral low back pain, unspecified whether sciatica present  (M54.12,  G89.29) Chronic radicular cervical pain  (G62.9) Peripheral polyneuropathy  Comment: Osvaldo presents in the company of his son, Santo. As noted, baseline pain is improving. Due to memory deficits, he may not remember to ask for PRN Oxycodone. If this continues, I will consider reducing PRN medication and increasing Fentanyl dose or schedule PRN doses. Adding Gabapentin 100 mg with titration to 300 mg TID for peripheral neuropathy. Follow up in 4 weeks for medication review.   Plan: gabapentin (NEURONTIN) 100 MG capsule, fentaNYL        (DURAGESIC) 12 mcg/hr 72 hr patch, oxyCODONE         (ROXICODONE) 5 MG tablet    Hypertension: Sina to follow up with Primary Care provider regarding elevated blood pressure.     PATIENT INSTRUCTIONS:     Diagnosis reviewed, treatment option addressed, and risk/benefits discussed.  Self-care instructions given.  I am recommending a multidisciplinary treatment plan to help this patient better manage pain.      Remember to request ALL  medication refills a MINIMUM OF 5 BUSINESS days before you run out.     1. 30 minutes Video or Clinic follow-up with ALPESH Tucker NP-C in 4 weeks.  2. Medication Management :   1. Refilled pain medications as noted on medication list.   2. Gabapentin 100 mg: see titration schedule  Here is how you should take your Neurontin  (gabapentin):  Day 1: _______________Take one 100 mg cap at bedtime for three days.  Day 4: _______________Take one 100 mg cap in the morning and one 100 mg cap at bedtime.   Day 7:_______________ Take one 100 mg cap three times a day.  Day 10: ______________Take one 100 mg cap in the morning, one 100 mg cap midday and two 100 mg caps at bedtime.  Day 13:______________ Take two 100 mg caps in the morning, one 100 mg cap midday and two 100mg caps at bedtime.  Day 16:______________Take two 100 mg capsules three times a day.  Day 19:______________Take two 100mg caps in the morning, two 100 mg caps midday and three 100mg cap at bedtime.  Day 22:______________Take three 100mg caps in the morning, two 100 mg caps midday and three 100mg caps at bedtime.  Day 25: ______________Take three 100 mg caps three times a day. Continue at this dose.    Additional Instructions  Caution for sedation.     You can increase more slowly if you need to.  Do not stop abruptly once at higher doses.  This medication must be tapered off.      I have reviewed the note as documented above.  This accurately captures the substance of my conversation with the patient.  A total of 40 minutes of preparation, care, and consultation were spent on this visit today.     ALPESH Alamo NP-C  [facility]    (Information in italics and blue color are taken from previous pain and consulting medical providers notes and are documented as such)

## 2023-04-06 NOTE — TELEPHONE ENCOUNTER
Call from Dilcia with Home Care transferred to author  Dilcia's call back: [phone]    Dilcia calling to cancel home health aide as patient's family feels they can assist with patient's showering needs  Requesting verbal order to cancel home health aide    Verbal order provided  Dilcia verbalized understanding  No further questions/concerns    Kike Flynn RN

## 2023-04-06 NOTE — PATIENT INSTRUCTIONS
After Visit Instructions:     Thank you for coming to [facility] for your care. It is my goal to partner with you to help you reach your optimal state of health.   Continue daily self-care, identifying contributing factors, and monitoring variations in pain level. Continue to integrate self-care into your life.      30 minutes Video or Clinic follow-up with ALPESH Tucker NP-C in 4 weeks.  Medication Management :   Refilled pain medications as noted on medication list.   Gabapentin 100 mg: see titration schedule  Here is how you should take your Neurontin  (gabapentin):  Day 1: _______________Take one 100 mg cap at bedtime for three days.  Day 4: _______________Take one 100 mg cap in the morning and one 100 mg cap at bedtime.   Day 7:_______________ Take one 100 mg cap three times a day.  Day 10: ______________Take one 100 mg cap in the morning, one 100 mg cap midday and two 100 mg caps at bedtime.  Day 13:______________ Take two 100 mg caps in the morning, one 100 mg cap midday and two 100mg caps at bedtime.  Day 16:______________Take two 100 mg capsules three times a day.  Day 19:______________Take two 100mg caps in the morning, two 100 mg caps midday and three 100mg cap at bedtime.  Day 22:______________Take three 100mg caps in the morning, two 100 mg caps midday and three 100mg caps at bedtime.  Day 25: ______________Take three 100 mg caps three times a day. Continue at this dose.    Additional Instructions  Caution for sedation.     You can increase more slowly if you need to.  Do not stop abruptly once at higher doses.  This medication must be tapered off.      ALPESH Alamo, NP-C  [facility]  [facility] - Monday and Friday  [place] - Tuesday  [place] - Thursday    Be sure to request ALL medication refills 5 days prior to the due date unless you will see your medical provider in an appointment before the due date.  This is YOUR  responsibility. Do not expect same day refills. If you do not plan ahead you may run out of medications.   NO early refills are allowed. It is your responsibility to manage your medications responsibility and keep them safely stored. Lost or destroyed medications WILL NOT be replaced    Scheduling/Clinic telephone Number for ALL locations:  [phone]    After Hours On-Call Service:  [phone]    Call with any questions about your care and for scheduling assistance.   Calls are returned Monday through Friday between 8 AM and 4:00 PM. We usually get back to you within 2 business days depending on the issue/request.    If we are prescribing your medications:  For opioid medication refills, call the clinic or send a [employer] message 7 days in advance.  Please include:  Your name and date of birth.   Name of requested medication  Name of the pharmacy.  For non-opioid medications, call your pharmacy directly to request a refill. Please allow 3-4 days to be processed.   Per [place] State Law:  All controlled substance prescriptions must be filled within 30 days of being written.    For those controlled substances allowing refills, pickup must occur within 30 days of last fill.      We believe regular attendance is key to your success in our program!    Any time you are unable to keep your appointment we ask that you call us at least 24 hours in advance to cancel.This will allow us to offer the appointment time to another patient.   Multiple missed appointments may lead to dismissal from the clinic.

## 2023-04-07 ENCOUNTER — TELEPHONE (OUTPATIENT)
Dept: PALLIATIVE MEDICINE | Facility: OTHER | Age: 83
End: 2023-04-07
Payer: MEDICARE

## 2023-04-07 NOTE — TELEPHONE ENCOUNTER
[facility] [phone] Outreach X1. Left a VM requesting call back.            If this is pertaining to Gabapentin titration those instructions are available in the AVS.     MARCO TorresN, RN  Care Coordinator  [facility] [place]

## 2023-04-07 NOTE — TELEPHONE ENCOUNTER
Health Call Center    Phone Message    May a detailed message be left on voicemail: yes     Reason for Call: Joy has questions about medication orders from yesterdays visit.  She is requesting a call back to clarify.  Thanks.

## 2023-04-07 NOTE — TELEPHONE ENCOUNTER
Spoke with Joy, and reviewed changes to medication regimen. Patient is titrating Gabapentin and has option of oxycodone 1 tab qid prn. Continue Fentanyl as is. No further questions.    MARCO TorresN, RN  Care Coordinator  [facility] [place]

## 2023-04-07 NOTE — TELEPHONE ENCOUNTER
[facility] Call Center    Phone Message    May a detailed message be left on voicemail: yes     Reason for Call: Other: Joy was returning a call to RN please call back when available .    Action Taken: Other: Pain    Travel Screening: Not Applicable

## 2023-04-13 ENCOUNTER — TELEPHONE (OUTPATIENT)
Dept: PALLIATIVE MEDICINE | Facility: OTHER | Age: 83
End: 2023-04-13
Payer: MEDICARE

## 2023-04-13 DIAGNOSIS — G89.29 CHRONIC RADICULAR CERVICAL PAIN: ICD-10-CM

## 2023-04-13 DIAGNOSIS — G89.29 CHRONIC BILATERAL LOW BACK PAIN, UNSPECIFIED WHETHER SCIATICA PRESENT: ICD-10-CM

## 2023-04-13 DIAGNOSIS — G89.29 CHRONIC BILATERAL THORACIC BACK PAIN: ICD-10-CM

## 2023-04-13 DIAGNOSIS — G89.29 CHRONIC INTRACTABLE PAIN: ICD-10-CM

## 2023-04-13 DIAGNOSIS — M15.3 POST-TRAUMATIC OSTEOARTHRITIS OF MULTIPLE JOINTS: ICD-10-CM

## 2023-04-13 DIAGNOSIS — M54.12 CHRONIC RADICULAR CERVICAL PAIN: ICD-10-CM

## 2023-04-13 DIAGNOSIS — G62.9 PERIPHERAL POLYNEUROPATHY: ICD-10-CM

## 2023-04-13 DIAGNOSIS — M54.6 CHRONIC BILATERAL THORACIC BACK PAIN: ICD-10-CM

## 2023-04-13 DIAGNOSIS — G89.4 CHRONIC PAIN SYNDROME: Primary | ICD-10-CM

## 2023-04-13 DIAGNOSIS — M54.50 CHRONIC BILATERAL LOW BACK PAIN, UNSPECIFIED WHETHER SCIATICA PRESENT: ICD-10-CM

## 2023-04-13 RX ORDER — OXYCODONE HYDROCHLORIDE 5 MG/1
5 TABLET ORAL EVERY 4 HOURS PRN
Qty: 90 TABLET | Refills: 0 | Status: SHIPPED | OUTPATIENT
Start: 2023-04-13 | End: 2023-05-04

## 2023-04-13 NOTE — TELEPHONE ENCOUNTER
DOI: 07/24/17  Initial Treatment Date: 07/24/17  Date Last Seen: 08/14/17  [facility] of Onset: Other  Occupation: Not Currently Employed  Referring by: Quiana Peacock M.D.   Primary Care Physician: Indra Dyer MD  Date informed consent signed:  05/01/17  ABN: N/A  I have reviewed the past medical history, family history, social history, medications and allergies listed in the medical record as obtained by my nursing staff and support staff and agree with their documentation.  Mario  reports that he quit smoking about 2 months ago. His smoking use included Cigarettes. He has a 15.00 pack-year smoking history. He quit smokeless tobacco use about 14 months ago.    Mario is allergic to sulfa antibiotics and metformin.   Advance Directive Status:No  Visit Number of Current Episode: 6        Subjective:  Mario is a 48 year old male who comes in today for evaluation and treatment of low back and hips. Patient relates she is doing not as bad today in the lower back pain with radiating symptoms into the right leg.  Patient rates his pain today at 5 with 10 being worst.    History of current illness: Patient is here today accompanied by his wife and child at the referral of Quiana Peacock M.D. from the [place] [facility].  Patient suffers from chronic low back pain that can be excruciating with radiating symptoms into the right lower extremity to the foot. Pain is constant and exacerbated with bending forward, twisting and sitting as well as standing and walking. Pain decreases somewhat with lying down. Patient does not report any pain with coughing, sneezing or strengthening a bowel movement. He does take muscle relaxants to try to decrease the symptoms. Patient has tried physical therapy with no improvement.  She does not report any loss of bowel or bladder control. He does not report any saddle paresthesia.  Patient suffers from gastroparesis is well as stomach cancer.    Medications currently taking:  Script Eprescribed to pharmacy    Signed Prescriptions:                        Disp   Refills    oxyCODONE (ROXICODONE) 5 MG tablet         90 tab*0        Sig: Take 1 tablet (5 mg) by mouth every 4 hours as needed           for pain (Max 4 tabs per day) Ok to fill/start           April 6, 2023. For Chronic pain  Authorizing Provider: LUCIAN HOPPER APRN, NP-C  [facility] [place]               Muscle relaxants, Antidepressants, Blood pressure medications, Antibiotics, Laxatives      Objective:    The patient is a 48 year old male who is pleasant, coherent ×3, ambulatory under his own power and does not appear to be in visible distress.  Visualization shows positive slump sign and loss of abdominal tone.  Sacral apex left is still present with positive left sacral leg check but minimal compared to previous visits. Right ilium is restricted PI. Tightness in erector spinae is less. Spinous left rotation at L5 is noted.       Orthopedic/neurologic testing on the initial visit:  Muscle testing is 5 bilaterally in the lower extremities. Deep tendon reflexes +2 bilaterally in the lower extremities.  Positive orthopedic tests: Right leg straight leg raise test eliciting right-sided pain on the lateral aspect of the leg proximal to the popliteal fossa. Bilateral Ely's test and right Yeomans test eliciting lower back pain on the right. Wright test.  Negative orthopedic tests: Well leg raise test. Left Yeomans and bilateral Nachlas.    Objective disability index score from the initial encounter for this episode is 92.5% using the Functional Rating Index questionnaire.    Patient Emotional screening, DoD/VA Pain Supplemental Questionnaire score was 37/40.      Relevant Diagnostic Imaging/Testing:  3/8/2017  MRI LUMBAR SPINE WO CONTRAST   HISTORY: LOW BACK PAIN, >6 WKS OR RED FLAG(S) OR RADICULOPATHY, chronic  back pain, history of colon cancer dx 1 year ago   At the L4-L5 level, there is right posterior disc osteophyte protuberance  of 3 mm. This slightly abuts the thecal sac which remains capacious . Mild  abutment of the right subarticular zone and foramen. Left subarticular zone  is relatively patent. There is mild abutment of the left foramen with a  generalized component vertebral spurring or bulging of 2 mm.   At the L5-S1 level, there is posterior disc osteophyte protuberance of 4  mm. This has a  cephalocaudad extent of approximately 9 mm. On prior CT exam  this has a calcific or ossific specific appearance. This does not  appreciably deform the thecal sac in the supine scanning position. There is  mild to moderate left and mild right facet hypertrophy. Subarticular zones  are minimally abutted. Mild abutment of the left foramen greater than  right.        Assessment:  Patient is subjectively feeling less pain since last visit. Lumbar, sacral and pelvic segmental dysfunction.   1. Segmental and somatic dysfunction of lumbar region    2. Segmental and somatic dysfunction of sacral region    3. Segmental and somatic dysfunction of pelvic region        Complicating Factors/Co-morbidities:  Intestinal cancer. Diabetes. Osteoporosis. 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 Gout.    Plan:  Patient was evaluated and treatment with Lee distraction/decompression to decrease disc compression and promote imbibition in the lumbar spine. Thomas drop technique is applied to the pelvis and sacrum. All treatment was well tolerated.    Therapeutic Exercise/Rehab/Modalities performed today:  None.    Patient Instruction/Education:  Abdominal bracing exercises should be performed throughout his day to provide core stabilization. Patient stated understanding of, and was in agreement with, the discussed instructions.    Goals of Care: Goal of care is to improve muscular and skeletal function and provide symptom relief.  Short-term goals are 50% pain resolution over the next 6 weeks    Other treatment options discussed with patient include but are not limited to massage therapy, physical therapy and acupuncture, as well as pain management.    Patient rates his pain post treatment at 5/10 with 10 being worst.    Patient is to follow-up with me in 2-3 days for further treatment under his current schedule of care.    Length of Visit:  15 minutes.

## 2023-04-13 NOTE — CONFIDENTIAL NOTE
Patient needs to schedule a follow up apt  Will include scheduling    4/6/23: oxycodone-30 tabs-7 days dispensed    Chronic pain syndrome added as a diagnosis code as well as in the prescription directions.    Cued remainder with additional information.    Pending Prescriptions:                       Disp   Refills    oxyCODONE (ROXICODONE) 5 MG tablet        90 tab*0            Sig: Take 1 tablet (5 mg) by mouth every 4 hours as           needed for pain (Max 4 tabs per day) Ok to           fill/start April 6, 2023. For Chronic pain    [facility]

## 2023-04-13 NOTE — TELEPHONE ENCOUNTER
Received fax from pharmacy requesting refill(s) for     oxyCODONE (ROXICODONE) 5 MG tablet     Last dispensed from pharmacy on 4/6/23 #30 NEEDS DIAGNOSE OF CHRONIC PAIN TO FILL MORE THAN 7 DAY SUPPLY    Patient's last office/virtual visit by prescribing provider on 4/6/23  Next office/virtual appointment scheduled for NONE    Last urine drug screen date 2/16/23  Current opioid agreement on file? Yes Date of opioid agreement: 2/16/23    E[facility]prescribe to:     [id number] [place] [place] - [address]    Will route to nursing [place] for review and preparation of prescription(s).

## 2023-04-27 ENCOUNTER — TRANSFERRED RECORDS (OUTPATIENT)
Dept: HEALTH INFORMATION MANAGEMENT | Facility: CLINIC | Age: 83
End: 2023-04-27
Payer: MEDICARE

## 2023-05-02 ENCOUNTER — TELEPHONE (OUTPATIENT)
Dept: PALLIATIVE MEDICINE | Facility: OTHER | Age: 83
End: 2023-05-02
Payer: MEDICARE

## 2023-05-02 DIAGNOSIS — G89.29 CHRONIC BILATERAL THORACIC BACK PAIN: ICD-10-CM

## 2023-05-02 DIAGNOSIS — M54.12 CHRONIC RADICULAR CERVICAL PAIN: ICD-10-CM

## 2023-05-02 DIAGNOSIS — M15.3 POST-TRAUMATIC OSTEOARTHRITIS OF MULTIPLE JOINTS: ICD-10-CM

## 2023-05-02 DIAGNOSIS — G89.4 CHRONIC PAIN SYNDROME: ICD-10-CM

## 2023-05-02 DIAGNOSIS — M54.50 CHRONIC BILATERAL LOW BACK PAIN, UNSPECIFIED WHETHER SCIATICA PRESENT: ICD-10-CM

## 2023-05-02 DIAGNOSIS — G89.29 CHRONIC RADICULAR CERVICAL PAIN: ICD-10-CM

## 2023-05-02 DIAGNOSIS — G62.9 PERIPHERAL POLYNEUROPATHY: ICD-10-CM

## 2023-05-02 DIAGNOSIS — G89.29 CHRONIC INTRACTABLE PAIN: ICD-10-CM

## 2023-05-02 DIAGNOSIS — M54.6 CHRONIC BILATERAL THORACIC BACK PAIN: ICD-10-CM

## 2023-05-02 DIAGNOSIS — G89.29 CHRONIC BILATERAL LOW BACK PAIN, UNSPECIFIED WHETHER SCIATICA PRESENT: ICD-10-CM

## 2023-05-02 NOTE — TELEPHONE ENCOUNTER
Received call from patient requesting refill(s) fentaNYL (DURAGESIC) 12 mcg/hr 72 hr patch    Last dispensed from pharmacy on 04/22/2023    Patient's last office/virtual visit by prescribing provider on 04/06/2023  Next office/virtual appointment scheduled for None    Last urine drug screen date 02/16/2023  Current opioid agreement on file (completed within the last year) Yes Date of opioid agreement: 02/16/2023    E-prescribe to     [facility] ONLY #762 - [facility] [address] [place] [address], pharmacy    Will route to nursing [place] for review and preparation of prescription(s).     Milagros Gaitan MA  [facility]

## 2023-05-04 RX ORDER — OXYCODONE HYDROCHLORIDE 5 MG/1
5 TABLET ORAL EVERY 4 HOURS PRN
Qty: 90 TABLET | Refills: 0 | Status: SHIPPED | OUTPATIENT
Start: 2023-05-04 | End: 2023-07-19

## 2023-05-04 RX ORDER — FENTANYL 12.5 UG/1
1 PATCH TRANSDERMAL
Qty: 15 PATCH | Refills: 0 | Status: SHIPPED | OUTPATIENT
Start: 2023-05-04 | End: 2023-07-07

## 2023-05-04 NOTE — TELEPHONE ENCOUNTER
Please remind patient of the 5-7 days refills policy. They may need to speak with the care center or take over ordering Osvaldo's meds if the center will not follow this policy.     Script Eprescribed to pharmacy    Signed Prescriptions:                        Disp   Refills    fentaNYL (DURAGESIC) 12 mcg/hr 72 hr patch 15 pat*0        Sig: Place 1 patch onto the skin every 48 hours Ok to fill           4/29/23 to start using on 5/31/23  Authorizing Provider: LUCIAN HOPPER    oxyCODONE (ROXICODONE) 5 MG tablet         90 tab*0        Sig: Take 1 tablet (5 mg) by mouth every 4 hours as needed           for pain (Max 4 tabs per day) Ok to fill 5/5/23           to start 5/6/23. For Chronic pain  Authorizing Provider: LUCIAN HOPPER APRN, NP-C  [facility]

## 2023-05-04 NOTE — TELEPHONE ENCOUNTER
Medication refill information reviewed. Patient received a 10 supply on 04/22/23 from [facility] pharmacy. Spoke to pharmacy and this is just their process for LTC. They still have a 20 day supply available for the patient. Will forward to provider to review if we can send an additional refill at this time.     Due date for fentaNYL (DURAGESIC) 12 mcg/hr 72 hr patch is 05/31/23     Prescriptions prepped for review.     Will route to provider.

## 2023-05-05 NOTE — TELEPHONE ENCOUNTER
Please check fill and start date on the Fentanyl. Once corrected, please inform patient. Thank you.

## 2023-05-09 NOTE — TELEPHONE ENCOUNTER
Spoke to patient, notified that prescriptions were sent to preferred pharmacy.      Caprice Lemus MA  [facility] [place]

## 2023-05-18 ENCOUNTER — TELEPHONE (OUTPATIENT)
Dept: FAMILY MEDICINE | Facility: CLINIC | Age: 83
End: 2023-05-18
Payer: MEDICARE

## 2023-05-18 NOTE — TELEPHONE ENCOUNTER
Olivia following up on 05-05-23 requested signed orders.  Advised to re- fax for provider signature/ return.  ERNST Velazquez RN

## 2023-05-22 DIAGNOSIS — F02.80 MAJOR NEUROCOGNITIVE DISORDER DUE TO ALZHEIMER'S DISEASE (H): ICD-10-CM

## 2023-05-22 DIAGNOSIS — I25.718 CORONARY ARTERY DISEASE OF AUTOLOGOUS VEIN BYPASS GRAFT WITH STABLE ANGINA PECTORIS (H): ICD-10-CM

## 2023-05-22 DIAGNOSIS — R41.89 SUBJECTIVE MEMORY COMPLAINTS: ICD-10-CM

## 2023-05-22 DIAGNOSIS — G62.9 PERIPHERAL POLYNEUROPATHY: ICD-10-CM

## 2023-05-22 DIAGNOSIS — F33.41 RECURRENT MAJOR DEPRESSIVE DISORDER, IN PARTIAL REMISSION (H): ICD-10-CM

## 2023-05-22 DIAGNOSIS — G30.9 MAJOR NEUROCOGNITIVE DISORDER DUE TO ALZHEIMER'S DISEASE (H): ICD-10-CM

## 2023-05-23 NOTE — TELEPHONE ENCOUNTER
"Requested Prescriptions   Pending Prescriptions Disp Refills     furosemide (LASIX) 20 MG tablet [Pharmacy Med Name: FUROSEMIDE 20MG TABLET] 30 tablet 1     Sig: TAKE 1 TABLET BY MOUTH DAILY       Diuretics (Including Combos) Protocol Failed - 5/22/2023  4:12 PM        Failed - Blood pressure under 140/90 in past 12 months     BP Readings from Last 3 Encounters:   04/06/23 (!) 150/70   04/04/23 122/62   03/31/23 (!) 146/73                 Passed - Recent (12 mo) or future (30 days) visit within the authorizing provider's specialty     Patient has had an office visit with the authorizing provider or a provider within the authorizing providers department within the previous 12 mos or has a future within next 30 days. See \"Patient Info\" tab in inbasket, or \"Choose Columns\" in Meds & Orders section of the refill encounter.              Passed - Medication is active on med list        Passed - Patient is age 18 or older        Passed - Normal serum creatinine on file in past 12 months     Recent Labs   Lab Test 04/04/23  1207   CR 0.71              Passed - Normal serum potassium on file in past 12 months     Recent Labs   Lab Test 04/04/23  1207   POTASSIUM 4.0                    Passed - Normal serum sodium on file in past 12 months     Recent Labs   Lab Test 04/04/23  1207                    sertraline (ZOLOFT) 100 MG tablet [Pharmacy Med Name: SERTRALINE 100MG TABLET] 135 tablet 0     Sig: TAKE 1 AND 1/2 TABS (150MG)BY MOUTH DAILY       SSRIs Protocol Failed - 5/22/2023  4:12 PM        Failed - PHQ-9 score less than 5 in past 6 months     Please review last PHQ-9 score.           Passed - Medication is active on med list        Passed - Patient is age 18 or older        Passed - Recent (6 mo) or future (30 days) visit within the authorizing provider's specialty     Patient had office visit in the last 6 months or has a visit in the next 30 days with authorizing provider or within the authorizing provider's " "specialty.  See \"Patient Info\" tab in inbasket, or \"Choose Columns\" in Meds & Orders section of the refill encounter.               busPIRone (BUSPAR) 15 MG tablet [Pharmacy Med Name: BUSPIRONE 15MG TABLET] 90 tablet 3     Sig: TAKE 1 TAB BY MOUTH EVERY 8 HOURS       Atypical Antidepressants Protocol Failed - 5/22/2023  4:12 PM        Failed - Patient has PHQ-9 score less than 5 in past 6 months.     Please review last PHQ-9 score.           Passed - Medication active on med list        Passed - Patient is age 18 or older        Passed - Recent (6 mo) or future (30 days) visit within the authorizing provider's specialty     Patient had office visit in the last 6 months or has a visit in the next 30 days with authorizing provider or within the authorizing provider's specialty.  See \"Patient Info\" tab in inbasket, or \"Choose Columns\" in Meds & Orders section of the refill encounter.                 "

## 2023-05-26 RX ORDER — BUSPIRONE HYDROCHLORIDE 15 MG/1
TABLET ORAL
Qty: 90 TABLET | Refills: 3 | Status: SHIPPED | OUTPATIENT
Start: 2023-05-26 | End: 2023-08-29

## 2023-05-26 RX ORDER — GABAPENTIN 100 MG/1
CAPSULE ORAL
Qty: 180 CAPSULE | Refills: 1 | Status: SHIPPED | OUTPATIENT
Start: 2023-05-26 | End: 2024-03-08

## 2023-05-26 RX ORDER — SERTRALINE HYDROCHLORIDE 100 MG/1
TABLET, FILM COATED ORAL
Qty: 135 TABLET | Refills: 0 | Status: SHIPPED | OUTPATIENT
Start: 2023-05-26 | End: 2023-07-12

## 2023-05-26 RX ORDER — FUROSEMIDE 20 MG
TABLET ORAL
Qty: 30 TABLET | Refills: 1 | Status: SHIPPED | OUTPATIENT
Start: 2023-05-26 | End: 2023-07-13

## 2023-05-26 RX ORDER — DONEPEZIL HYDROCHLORIDE 10 MG/1
10 TABLET, FILM COATED ORAL AT BEDTIME
Qty: 90 TABLET | Refills: 1 | Status: SHIPPED | OUTPATIENT
Start: 2023-05-26 | End: 2023-10-30

## 2023-06-02 ENCOUNTER — HEALTH MAINTENANCE LETTER (OUTPATIENT)
Age: 83
End: 2023-06-02

## 2023-06-09 ENCOUNTER — TRANSFERRED RECORDS (OUTPATIENT)
Dept: HEALTH INFORMATION MANAGEMENT | Facility: CLINIC | Age: 83
End: 2023-06-09
Payer: MEDICARE

## 2023-06-13 DIAGNOSIS — M54.6 CHRONIC BILATERAL THORACIC BACK PAIN: Primary | ICD-10-CM

## 2023-06-13 DIAGNOSIS — G89.29 CHRONIC BILATERAL THORACIC BACK PAIN: Primary | ICD-10-CM

## 2023-06-14 RX ORDER — GABAPENTIN 300 MG/1
CAPSULE ORAL
Qty: 84 CAPSULE | Refills: 0 | Status: SHIPPED | OUTPATIENT
Start: 2023-06-14 | End: 2023-07-13

## 2023-07-06 DIAGNOSIS — G89.29 CHRONIC INTRACTABLE PAIN: ICD-10-CM

## 2023-07-06 DIAGNOSIS — G62.9 PERIPHERAL POLYNEUROPATHY: ICD-10-CM

## 2023-07-06 DIAGNOSIS — M54.50 CHRONIC BILATERAL LOW BACK PAIN, UNSPECIFIED WHETHER SCIATICA PRESENT: ICD-10-CM

## 2023-07-06 DIAGNOSIS — G89.29 CHRONIC BILATERAL LOW BACK PAIN, UNSPECIFIED WHETHER SCIATICA PRESENT: ICD-10-CM

## 2023-07-06 DIAGNOSIS — M15.3 POST-TRAUMATIC OSTEOARTHRITIS OF MULTIPLE JOINTS: ICD-10-CM

## 2023-07-06 DIAGNOSIS — G89.29 CHRONIC RADICULAR CERVICAL PAIN: ICD-10-CM

## 2023-07-06 DIAGNOSIS — M54.6 CHRONIC BILATERAL THORACIC BACK PAIN: ICD-10-CM

## 2023-07-06 DIAGNOSIS — G89.29 CHRONIC BILATERAL THORACIC BACK PAIN: ICD-10-CM

## 2023-07-06 DIAGNOSIS — M54.12 CHRONIC RADICULAR CERVICAL PAIN: ICD-10-CM

## 2023-07-06 NOTE — TELEPHONE ENCOUNTER
[facility] Call Center    Phone Message    May a detailed message be left on voicemail: no     Reason for Call: Medication Refill Request    Has the patient contacted the pharmacy for the refill? Yes   Name of medication being requested: fentaNYL (DURAGESIC) 12 mcg/hr 72 hr patch   Provider who prescribed the medication: Anne Herman  Pharmacy: [facility] #[id number] - [facility] [address]  Date medication is needed: 7/6/23     Sophia is calling from the patients assisted living home stating that the patient is completely out of this medication and that they had contacted the pharmacy who had sent a refill request fax to us with no response. The patient is needing this medication ASAP.       Action Taken: Message routed to:  Other: [facility]    Travel Screening: Not Applicable

## 2023-07-06 NOTE — TELEPHONE ENCOUNTER
Patient requesting refill(s) of fentaNYL (DURAGESIC) 12 mcg/hr 72 hr patch     Per pharmacy- delivered from pharmacy on 06/26/23    Patient's last office/virtual visit by prescribing provider on 4/6/23  Next office/virtual appointment scheduled for None     Last urine drug screen date 2/16/23  Current opioid agreement on file (completed within the last year) Yes Date of opioid agreement: 2/16/23    E-prescribe to  [facility] ONLY #762 - [place], [place]     Will route to nursing [place] for review and preparation of prescription(s).

## 2023-07-07 RX ORDER — FENTANYL 12.5 UG/1
1 PATCH TRANSDERMAL
Qty: 15 PATCH | Refills: 0 | Status: SHIPPED | OUTPATIENT
Start: 2023-07-07 | End: 2023-08-08

## 2023-07-07 NOTE — TELEPHONE ENCOUNTER
[facility] Call Center    Phone Message    May a detailed message be left on voicemail: yes     Reason for Call: Other: Sophia from [facility] is calling in regards to the status of the medication. Please call back when available.      Action Taken: Other: Pain    Travel Screening: Not Applicable

## 2023-07-07 NOTE — TELEPHONE ENCOUNTER
Script Eprescribed to pharmacy  [place] Prescription Monitoring Program checked      Signed Prescriptions:                        Disp   Refills    fentaNYL (DURAGESIC) 12 mcg/hr 72 hr patch 15 pat*0        Sig: Place 1 patch onto the skin every 48 hours Fill and           start 7/7/23  Authorizing Provider: PADMINI MCADAMS MD

## 2023-07-07 NOTE — TELEPHONE ENCOUNTER
Routing to provider pool to review medication prepped per below for Anne Batsheva pt      fentaNYL (DURAGESIC) 12 mcg/hr 72 hr patch #15, Refill:0  Sig:Place 1 patch onto the skin every 48 hours   Last picked up 6/24/23 (#5) and 6/12 (#5), 6/3 (#5) for total of 30 day supply starting 6/3 per   Due: Fill and start 7/7/23    Per last OV note 46/23: Fentanyl patch 12.5 mcg patch. Change patch every 48 hrs.   Increase Oxycodone 5 mg to three (3) full tabs per day.       [facility] Only #[id number] - [place], [place] - [address]  [address]  [facility] [address]  Phone: [phone] Fax: [phone]    Viri SUN RN Care Coordinator  [facility]

## 2023-07-10 DIAGNOSIS — I25.718 CORONARY ARTERY DISEASE OF AUTOLOGOUS VEIN BYPASS GRAFT WITH STABLE ANGINA PECTORIS (H): ICD-10-CM

## 2023-07-10 DIAGNOSIS — G89.29 CHRONIC BILATERAL THORACIC BACK PAIN: ICD-10-CM

## 2023-07-10 DIAGNOSIS — M54.6 CHRONIC BILATERAL THORACIC BACK PAIN: ICD-10-CM

## 2023-07-10 NOTE — TELEPHONE ENCOUNTER
"Routing refill request to provider for review/approval because:  Drug not on the [facility] refill protocol   BP does not meet parameter        Requested Prescriptions   Pending Prescriptions Disp Refills     gabapentin (NEURONTIN) 300 MG capsule [Pharmacy Med Name: GABAPENTIN 300MG CAPSULE] 84 capsule 0     Sig: TAKE 1 CAPSULE BY MOUTH THREE TIMES DAILY (START AFTER TAPER HAS BEEN COMPLETED)       There is no refill protocol information for this order        furosemide (LASIX) 20 MG tablet [Pharmacy Med Name: FUROSEMIDE 20MG TABLET] 30 tablet 1     Sig: TAKE 1 TABLET BY MOUTH DAILY       Diuretics (Including Combos) Protocol Failed - 7/10/2023 12:56 PM        Failed - Blood pressure under 140/90 in past 12 months     BP Readings from Last 3 Encounters:   04/06/23 (!) 150/70   04/04/23 122/62   03/31/23 (!) 146/73                 Passed - Recent (12 mo) or future (30 days) visit within the authorizing provider's specialty     Patient has had an office visit with the authorizing provider or a provider within the authorizing providers department within the previous 12 mos or has a future within next 30 days. See \"Patient Info\" tab in inbasket, or \"Choose Columns\" in Meds & Orders section of the refill encounter.              Passed - Medication is active on med list        Passed - Patient is age 18 or older        Passed - Normal serum creatinine on file in past 12 months     Recent Labs   Lab Test 04/04/23  1207   CR 0.71              Passed - Normal serum potassium on file in past 12 months     Recent Labs   Lab Test 04/04/23  1207   POTASSIUM 4.0                    Passed - Normal serum sodium on file in past 12 months     Recent Labs   Lab Test 04/04/23  1207                          Maryann Son RN 07/10/23 5:16 PM    "

## 2023-07-12 ENCOUNTER — OFFICE VISIT (OUTPATIENT)
Dept: FAMILY MEDICINE | Facility: CLINIC | Age: 83
End: 2023-07-12
Attending: FAMILY MEDICINE
Payer: MEDICARE

## 2023-07-12 VITALS
HEART RATE: 57 BPM | TEMPERATURE: 98.5 F | DIASTOLIC BLOOD PRESSURE: 58 MMHG | BODY MASS INDEX: 27.82 KG/M2 | SYSTOLIC BLOOD PRESSURE: 146 MMHG | OXYGEN SATURATION: 94 % | WEIGHT: 199.5 LBS | RESPIRATION RATE: 16 BRPM

## 2023-07-12 DIAGNOSIS — F11.90 CHRONIC, CONTINUOUS USE OF OPIOIDS: Chronic | ICD-10-CM

## 2023-07-12 DIAGNOSIS — Z23 NEED FOR SHINGLES VACCINE: ICD-10-CM

## 2023-07-12 DIAGNOSIS — D50.8 IRON DEFICIENCY ANEMIA SECONDARY TO INADEQUATE DIETARY IRON INTAKE: ICD-10-CM

## 2023-07-12 DIAGNOSIS — Z00.00 WELLNESS EXAMINATION: ICD-10-CM

## 2023-07-12 DIAGNOSIS — F33.41 RECURRENT MAJOR DEPRESSIVE DISORDER, IN PARTIAL REMISSION (H): ICD-10-CM

## 2023-07-12 DIAGNOSIS — F02.80 ALZHEIMER'S DISEASE (H): Chronic | ICD-10-CM

## 2023-07-12 DIAGNOSIS — Z98.890 H/O COLONOSCOPY WITH POLYPECTOMY: ICD-10-CM

## 2023-07-12 DIAGNOSIS — G30.9 ALZHEIMER'S DISEASE (H): Chronic | ICD-10-CM

## 2023-07-12 DIAGNOSIS — Z86.0100 H/O COLONOSCOPY WITH POLYPECTOMY: ICD-10-CM

## 2023-07-12 DIAGNOSIS — Z23 NEED FOR DIPHTHERIA-TETANUS-PERTUSSIS (TDAP) VACCINE: ICD-10-CM

## 2023-07-12 DIAGNOSIS — G47.33 OSA (OBSTRUCTIVE SLEEP APNEA): Chronic | ICD-10-CM

## 2023-07-12 DIAGNOSIS — Z12.11 SCREEN FOR COLON CANCER: Primary | ICD-10-CM

## 2023-07-12 LAB — HGB BLD-MCNC: 12.3 G/DL (ref 13.3–17.7)

## 2023-07-12 PROCEDURE — 36415 COLL VENOUS BLD VENIPUNCTURE: CPT | Performed by: FAMILY MEDICINE

## 2023-07-12 PROCEDURE — 85018 HEMOGLOBIN: CPT | Performed by: FAMILY MEDICINE

## 2023-07-12 PROCEDURE — 90677 PCV20 VACCINE IM: CPT | Performed by: FAMILY MEDICINE

## 2023-07-12 PROCEDURE — 99214 OFFICE O/P EST MOD 30 MIN: CPT | Mod: 25 | Performed by: FAMILY MEDICINE

## 2023-07-12 PROCEDURE — G0009 ADMIN PNEUMOCOCCAL VACCINE: HCPCS | Performed by: FAMILY MEDICINE

## 2023-07-12 RX ORDER — SERTRALINE HYDROCHLORIDE 100 MG/1
100 TABLET, FILM COATED ORAL DAILY
Qty: 90 TABLET | Refills: 0 | Status: SHIPPED | OUTPATIENT
Start: 2023-07-12 | End: 2023-08-29

## 2023-07-12 ASSESSMENT — ANXIETY QUESTIONNAIRES
2. NOT BEING ABLE TO STOP OR CONTROL WORRYING: NOT AT ALL
5. BEING SO RESTLESS THAT IT IS HARD TO SIT STILL: SEVERAL DAYS
4. TROUBLE RELAXING: NOT AT ALL
GAD7 TOTAL SCORE: 1
6. BECOMING EASILY ANNOYED OR IRRITABLE: NOT AT ALL
1. FEELING NERVOUS, ANXIOUS, OR ON EDGE: NOT AT ALL
3. WORRYING TOO MUCH ABOUT DIFFERENT THINGS: NOT AT ALL
GAD7 TOTAL SCORE: 1
7. FEELING AFRAID AS IF SOMETHING AWFUL MIGHT HAPPEN: NOT AT ALL
IF YOU CHECKED OFF ANY PROBLEMS ON THIS QUESTIONNAIRE, HOW DIFFICULT HAVE THESE PROBLEMS MADE IT FOR YOU TO DO YOUR WORK, TAKE CARE OF THINGS AT HOME, OR GET ALONG WITH OTHER PEOPLE: NOT DIFFICULT AT ALL

## 2023-07-12 ASSESSMENT — PAIN SCALES - GENERAL: PAINLEVEL: MILD PAIN (2)

## 2023-07-12 ASSESSMENT — PATIENT HEALTH QUESTIONNAIRE - PHQ9
SUM OF ALL RESPONSES TO PHQ QUESTIONS 1-9: 0
10. IF YOU CHECKED OFF ANY PROBLEMS, HOW DIFFICULT HAVE THESE PROBLEMS MADE IT FOR YOU TO DO YOUR WORK, TAKE CARE OF THINGS AT HOME, OR GET ALONG WITH OTHER PEOPLE: NOT DIFFICULT AT ALL
SUM OF ALL RESPONSES TO PHQ QUESTIONS 1-9: 0

## 2023-07-12 NOTE — PROGRESS NOTES
"  Assessment & Plan       (G30.9,  F02.80) Alzheimer's disease (H)  Good control.  Continue currant medications, continue to monitor.      (Z00.00) Wellness examination    Chronic, continuous use of opioids  Via pain clinic. Pain appears wel controlled with fentnyl patch  seng has to ask for as needed oxycodone.     Recurrent major depressive disorder, in partial remission (H)  Good control.  Will decrease from faily high dose to 100mg a day  - sertraline (ZOLOFT) 100 MG tablet; Take 1 tablet (100 mg) by mouth daily    Iron deficiency anemia secondary to inadequate dietary iron intake  Improved at [facility] blood draw  - Hemoglobin; Future    KALIA (obstructive sleep apnea)- severe (AHI 55)  Much improved since he lost weight since moving into [place]    H/O colonoscopy with polypectomy  Had 12 polyps removed  One with dysplasia  Recommend repea colonoscopy in 2024.     Need for diphtheria-tetanus-pertussis (Tdap) vaccine    - Tdap, tetanus-diptheria-acell pertussis, (BOOSTRIX) 5-2.5-18.5 LF-MCG/0.5 CECILY injection; Inject 0.5 mLs into the muscle once for 1 dose        Wt Readings from Last 10 Encounters:   07/12/23 90.5 kg (199 lb 8 oz)   04/06/23 87.5 kg (193 lb)   04/04/23 87.5 kg (193 lb)   03/29/23 92.7 kg (204 lb 5.9 oz)   02/16/23 91.2 kg (201 lb)   12/15/22 95.3 kg (210 lb)   07/27/22 97.1 kg (214 lb)   07/20/22 96.4 kg (212 lb 8 oz)   07/14/22 98 kg (216 lb)   05/24/22 97.5 kg (215 lb)          BMI:   Estimated body mass index is 27.82 kg/m  as calculated from the following:    Height as of 3/20/23: 1.803 m (5' 11\").    Weight as of this encounter: 90.5 kg (199 lb 8 oz).   Weight management plan: Discussed healthy diet and exercise guidelines      Ramón Talavera MD  [facility] LYNDSAY Andino is a 82 year old, presenting for the following health issues:  Recheck Medication        7/12/2023     3:13 PM   Additional Questions   Roomed by Milagros GUNTER CMA   Accompanied by Levon[place]Miles "     History of Present Illness       Reason for visit:  Annual wellness     Today's PHQ-9         PHQ-9 Total Score: 0    PHQ-9 Q9 Thoughts of better off dead/self-harm past 2 weeks :   Not at all    How difficult have these problems made it for you to do your work, take care of things at home, or get along with other people: Not difficult at all  Today's AXEL-7 Score: 1       Hyperlipidemia Follow-Up      Are you regularly taking any medication or supplement to lower your cholesterol?   Yes- Atorvastatin    Are you having muscle aches or other side effects that you think could be caused by your cholesterol lowering medication?  No    Hypertension Follow-up      Do you check your blood pressure regularly outside of the clinic? Yes     Are you following a low salt diet? No    Are your blood pressures ever more than 140 on the top number (systolic) OR more   than 90 on the bottom number (diastolic), for example 140/90? Unsure    Depression and Anxiety Follow-Up    How are you doing with your depression since your last visit? No change    How are you doing with your anxiety since your last visit?  No change    Are you having other symptoms that might be associated with depression or anxiety? No    Have you had a significant life event? No     Do you have any concerns with your use of alcohol or other drugs? No    Social History     Tobacco Use     Smoking status: Former     Types: Cigarettes     Quit date:      Years since quittin.5     Passive exposure: Never     Smokeless tobacco: Never   Vaping Use     Vaping Use: Never used   Substance Use Topics     Alcohol use: Never     Drug use: Never         2022     3:26 PM 2023     3:02 PM   PHQ   PHQ-9 Total Score 6 0   Q9: Thoughts of better off dead/self-harm past 2 weeks Not at all Not at all         2023     5:47 PM 4/3/2023     6:18 PM 2023     3:03 PM   AXEL-7 SCORE   Total Score 0 (minimal anxiety) 0 (minimal anxiety) 1 (minimal anxiety)    Total Score 0 0 1         7/12/2023     3:02 PM   Last PHQ-9   1.  Little interest or pleasure in doing things 0   2.  Feeling down, depressed, or hopeless 0   3.  Trouble falling or staying asleep, or sleeping too much 0   4.  Feeling tired or having little energy 0   5.  Poor appetite or overeating 0   6.  Feeling bad about yourself 0   7.  Trouble concentrating 0   8.  Moving slowly or restless 0   Q9: Thoughts of better off dead/self-harm past 2 weeks 0   PHQ-9 Total Score 0         7/12/2023     3:03 PM   AXEL-7    1. Feeling nervous, anxious, or on edge 0   2. Not being able to stop or control worrying 0   3. Worrying too much about different things 0   4. Trouble relaxing 0   5. Being so restless that it is hard to sit still 1   6. Becoming easily annoyed or irritable 0   7. Feeling afraid, as if something awful might happen 0   AXEL-7 Total Score 1   If you checked any problems, how difficult have they made it for you to do your work, take care of things at home, or get along with other people? Not difficult at all     Suicide Assessment Five-step Evaluation and Treatment (SAFE-T)    Review of Systems   Constitutional, HEENT, cardiovascular, pulmonary, gi and gu systems are negative, except as otherwise noted.      Objective    BP (!) 146/58 (BP Location: Right arm, Patient Position: Sitting, Cuff Size: Adult Large)   Pulse 57   Temp 98.5  F (36.9  C) (Tympanic)   Resp 16   Wt 90.5 kg (199 lb 8 oz)   SpO2 94%   BMI 27.82 kg/m    Body mass index is 27.82 kg/m .  Physical Exam   GENERAL: healthy, alert and no distress  NECK: no adenopathy, no asymmetry, masses, or scars and thyroid normal to palpation  RESP: lungs clear to auscultation - no rales, rhonchi or wheezes  CV: regular rate and rhythm, normal S1 S2, no S3 or S4, no murmur, click or rub, no peripheral edema and peripheral pulses strong  ABDOMEN: soft, nontender, no hepatosplenomegaly, no masses and bowel sounds normal  MS: no gross  musculoskeletal defects noted, no edema

## 2023-07-13 RX ORDER — GABAPENTIN 300 MG/1
CAPSULE ORAL
Qty: 84 CAPSULE | Refills: 0 | Status: SHIPPED | OUTPATIENT
Start: 2023-07-13 | End: 2023-08-02

## 2023-07-13 RX ORDER — FUROSEMIDE 20 MG
TABLET ORAL
Qty: 30 TABLET | Refills: 1 | Status: SHIPPED | OUTPATIENT
Start: 2023-07-13 | End: 2023-08-29

## 2023-07-19 ENCOUNTER — MYC MEDICAL ADVICE (OUTPATIENT)
Dept: PALLIATIVE MEDICINE | Facility: CLINIC | Age: 83
End: 2023-07-19
Payer: MEDICARE

## 2023-07-19 ENCOUNTER — TRANSFERRED RECORDS (OUTPATIENT)
Dept: HEALTH INFORMATION MANAGEMENT | Facility: CLINIC | Age: 83
End: 2023-07-19
Payer: MEDICARE

## 2023-07-19 DIAGNOSIS — M54.6 CHRONIC BILATERAL THORACIC BACK PAIN: ICD-10-CM

## 2023-07-19 DIAGNOSIS — M54.12 CHRONIC RADICULAR CERVICAL PAIN: ICD-10-CM

## 2023-07-19 DIAGNOSIS — G89.29 CHRONIC BILATERAL LOW BACK PAIN, UNSPECIFIED WHETHER SCIATICA PRESENT: ICD-10-CM

## 2023-07-19 DIAGNOSIS — M54.50 CHRONIC BILATERAL LOW BACK PAIN, UNSPECIFIED WHETHER SCIATICA PRESENT: ICD-10-CM

## 2023-07-19 DIAGNOSIS — M15.3 POST-TRAUMATIC OSTEOARTHRITIS OF MULTIPLE JOINTS: ICD-10-CM

## 2023-07-19 DIAGNOSIS — G89.4 CHRONIC PAIN SYNDROME: ICD-10-CM

## 2023-07-19 DIAGNOSIS — G89.29 CHRONIC RADICULAR CERVICAL PAIN: ICD-10-CM

## 2023-07-19 DIAGNOSIS — G89.29 CHRONIC INTRACTABLE PAIN: ICD-10-CM

## 2023-07-19 DIAGNOSIS — G62.9 PERIPHERAL POLYNEUROPATHY: ICD-10-CM

## 2023-07-19 DIAGNOSIS — G89.29 CHRONIC BILATERAL THORACIC BACK PAIN: ICD-10-CM

## 2023-07-19 NOTE — TELEPHONE ENCOUNTER
Medication refill information reviewed. CSA was signed 02/16/23    Due date for oxyCODONE (ROXICODONE) 5 MG tablet is 07/19/23     Prescriptions prepped for review.     Will route to provider.

## 2023-07-19 NOTE — TELEPHONE ENCOUNTER
Received refill request for:    oxyCODONE (ROXICODONE) 5 MG tablet      Last dispensed from pharmacy on 5/7/2023.    Patient's last office/virtual visit by prescribing provider on 4/6/2023.    Next office/virtual appointment scheduled for NONE.    Last urine drug screen date 2/16/2023.    Current opioid agreement on file? Yes Date of opioid agreement: 2/16/2023 - no proof of signature.    [facility]prescribe to:     [id number] [address]    Will route to nursing [place] for review and preparation of prescription(s).

## 2023-07-19 NOTE — TELEPHONE ENCOUNTER
Patient is 3 months overdue for follow up. Please contact him to schedule an appt. Once there is a CLINIC appt on the schedule, I will sign refills.   ALPESH Alamo, NP-C  [facility] [place]

## 2023-07-19 NOTE — TELEPHONE ENCOUNTER
Steffen sent to pt:  You  Sina Gomez Just now (4:27 PM)   Garrett Andino     In order to process your oxycodone  refill you need to schedule your next appointment with ALPESH Alamo, NP-C       Please call the appointment line to schedule ([phone]).     Let us know once this appointment is made and we will process your refill.     Sandy Balbuena RN-BSN  [facility] [place]

## 2023-07-21 ENCOUNTER — TELEPHONE (OUTPATIENT)
Dept: FAMILY MEDICINE | Facility: CLINIC | Age: 83
End: 2023-07-21
Payer: MEDICARE

## 2023-07-21 NOTE — TELEPHONE ENCOUNTER
Patients son Rajiv called today. Son stated the pain clinic would not give him information about his father because he couldn't find the power of  forms. RN found POA forms for financial and property but did not find health care directive.  Given number to honoring choices.    Maryann Son RN on 7/21/2023 at 4:17 PM

## 2023-07-24 RX ORDER — OXYCODONE HYDROCHLORIDE 5 MG/1
5 TABLET ORAL EVERY 4 HOURS PRN
Qty: 90 TABLET | Refills: 0 | Status: SHIPPED | OUTPATIENT
Start: 2023-07-24 | End: 2023-08-17

## 2023-07-24 NOTE — TELEPHONE ENCOUNTER
Patient is scheduled on 08/17/23 for an in clinic visit at [place].    MARCO TorresN, RN  Care Coordinator  [facility] [place]

## 2023-07-24 NOTE — TELEPHONE ENCOUNTER
Please call Sina's son Santo and request that he bring an accurate medication list from the care facility when he brings Osvaldo in.     Thanks, DV     Let him Oxycodone was sent to pharmacy.      Script Eprescribed to pharmacy    Signed Prescriptions:                        Disp   Refills    oxyCODONE (ROXICODONE) 5 MG tablet         90 tab*0        Sig: Take 1 tablet (5 mg) by mouth every 4 hours as needed           for pain Max 3/day. Fill/start 07/24/23. 30 day           supply  for Chronic pain  Authorizing Provider: LUCIAN HOPPER APRN, NP-C  [facility]

## 2023-07-25 ENCOUNTER — TELEPHONE (OUTPATIENT)
Dept: PALLIATIVE MEDICINE | Facility: CLINIC | Age: 83
End: 2023-07-25
Payer: MEDICARE

## 2023-07-25 NOTE — TELEPHONE ENCOUNTER
Received refill request for:    oxyCODONE (ROXICODONE) 5 MG tablet      Last dispensed from pharmacy on 5/7/2023.    Patient's last office/virtual visit by prescribing provider on 4/6/2023.    Next office/virtual appointment scheduled for 8/17/2023.    Last urine drug screen date 2/16/2023.    Current opioid agreement on file? Yes Date of opioid agreement: 2/16/2023.    Note: See previous encounter(s) regarding medication refill. In person visit scheduled for 8/17/2023.    E[facility]prescribe to:     [facility] ONLY #[id number] - [facility] [address]    Will route to nursing [place] for review and preparation of prescription(s).

## 2023-07-28 NOTE — TELEPHONE ENCOUNTER
He has an in clinic appointment on 08/17/23    MARCO TorresN, RN  Care Coordinator  [facility] [place]

## 2023-08-02 DIAGNOSIS — M54.6 CHRONIC BILATERAL THORACIC BACK PAIN: ICD-10-CM

## 2023-08-02 DIAGNOSIS — G89.29 CHRONIC BILATERAL THORACIC BACK PAIN: ICD-10-CM

## 2023-08-02 RX ORDER — TRAZODONE HYDROCHLORIDE 100 MG/1
TABLET ORAL
Qty: 90 TABLET | Refills: 1 | Status: SHIPPED | OUTPATIENT
Start: 2023-08-02 | End: 2024-02-13

## 2023-08-02 RX ORDER — GABAPENTIN 300 MG/1
CAPSULE ORAL
Qty: 84 CAPSULE | Refills: 0 | Status: SHIPPED | OUTPATIENT
Start: 2023-08-02 | End: 2023-08-29

## 2023-08-02 NOTE — TELEPHONE ENCOUNTER
"Routing refill request to provider for review/approval because:  Drug not on the [facility] refill protocol   Medication is reported/historical      Requested Prescriptions   Pending Prescriptions Disp Refills    gabapentin (NEURONTIN) 300 MG capsule [Pharmacy Med Name: GABAPENTIN 300MG CAPSULE] 84 capsule 0     Sig: TAKE 1 CAPSULE BY MOUTH THREE TIMES DAILY       There is no refill protocol information for this order       traZODone (DESYREL) 100 MG tablet [Pharmacy Med Name: TRAZODONE 100MG TABLET] 90 tablet 4     Sig: TAKE 1 TABLET BY MOUTH AT BEDTIME       Serotonin Modulators Passed - 8/2/2023  3:03 PM        Passed - Recent (12 mo) or future (30 days) visit within the authorizing provider's specialty     Patient has had an office visit with the authorizing provider or a provider within the authorizing providers department within the previous 12 mos or has a future within next 30 days. See \"Patient Info\" tab in inbasket, or \"Choose Columns\" in Meds & Orders section of the refill encounter.              Passed - Medication is active on med list        Passed - Patient is age 18 or older                 Kike Flynn RN 08/02/23 3:04 PM   "

## 2023-08-08 DIAGNOSIS — G89.29 CHRONIC RADICULAR CERVICAL PAIN: ICD-10-CM

## 2023-08-08 DIAGNOSIS — G89.29 CHRONIC BILATERAL LOW BACK PAIN, UNSPECIFIED WHETHER SCIATICA PRESENT: ICD-10-CM

## 2023-08-08 DIAGNOSIS — G62.9 PERIPHERAL POLYNEUROPATHY: ICD-10-CM

## 2023-08-08 DIAGNOSIS — M54.50 CHRONIC BILATERAL LOW BACK PAIN, UNSPECIFIED WHETHER SCIATICA PRESENT: ICD-10-CM

## 2023-08-08 DIAGNOSIS — G89.29 CHRONIC BILATERAL THORACIC BACK PAIN: ICD-10-CM

## 2023-08-08 DIAGNOSIS — G89.29 CHRONIC INTRACTABLE PAIN: ICD-10-CM

## 2023-08-08 DIAGNOSIS — M15.3 POST-TRAUMATIC OSTEOARTHRITIS OF MULTIPLE JOINTS: ICD-10-CM

## 2023-08-08 DIAGNOSIS — M54.12 CHRONIC RADICULAR CERVICAL PAIN: ICD-10-CM

## 2023-08-08 DIAGNOSIS — M54.6 CHRONIC BILATERAL THORACIC BACK PAIN: ICD-10-CM

## 2023-08-08 NOTE — TELEPHONE ENCOUNTER
Medication refill information reviewed. [facility] dispenses 10 day supplies to the LTC facilities. As of 08/02/23 he had a 20 day supply remaining at the pharmacy.     Due date for fentaNYL (DURAGESIC) 12 mcg/hr 72 hr patch   is 08/22/23     Prescriptions prepped for review.     Will route to provider.

## 2023-08-08 NOTE — TELEPHONE ENCOUNTER
Received refill request for:    fentaNYL (DURAGESIC) 12 mcg/hr 72 hr patch      Last dispensed from pharmacy on 8/2/2023.    Patient's last office/virtual visit by prescribing provider on 4/6/2023.    Next office/virtual appointment scheduled for 8/17/2023.    Last urine drug screen date 2/16/2023.    Current opioid agreement on file? Yes Date of opioid agreement: 2/16/2023.    E[facility]prescribe to:     [id number] [address]    Will route to nursing [place] for review and preparation of prescription(s).

## 2023-08-09 RX ORDER — FENTANYL 12.5 UG/1
1 PATCH TRANSDERMAL
Qty: 15 PATCH | Refills: 0 | Status: SHIPPED | OUTPATIENT
Start: 2023-08-09 | End: 2023-09-19

## 2023-08-09 NOTE — TELEPHONE ENCOUNTER
See  message to patient and his son.     ALPESH Alamo, NP-C  [facility] [place]

## 2023-08-15 NOTE — PROGRESS NOTES
[facility]    CHIEF COMPLAINT: Chronic Pain.    INTERVAL HISTORY:  Last seen on 4/6/23.       Recommendations/plan at the last visit included:    30 minutes Video or Clinic follow-up with ALPESH Tucker NP-C in 4 weeks.  Medication Management :   Refilled pain medications as noted on medication list.   Gabapentin 100 mg: see titration schedule    Since last visit:   -  Left shoulder pain is difficult, steroid injections only help for a few days, not a candidate for surgical intervention    Pain Information today: Severe Pain (7)/10. Location of pain: left shoulder, widespread.    Annual Requirements last collected: February 16, 2023     Current Pain Relevant Medications:    Acetaminophen 500 mg 1-2 tabs q 8 h PRN  Buspar 15 mg TID   Diclofenac gel 1% BID PRN   Gabapentin 100 mg: Starting titration up to 300 mg TID.   Trazodone 100 mg at HS      Current Controlled Substance Medications:   Fentanyl 12 mcg change patch q48h = 30 MME/day  Oxycodone IR 5 mg QID PRN  Max 30MME/day              Total opiate dose = 60 MME/day      Previous Pain Relevant Medications: (H--helped; HI--Helped initially; SWH--Somewhat helpful; NH--No help; W--worse; SE--side effects; ?--Unsure if helpful)   Opiates: Oxycodone:H, Fentanyl:?,   NSAIDS:   Migraine medications:   Muscle Relaxants:   Neuropathics:   Anti-depressants for pain:         Anxiety medications: Buspar:H,           Topicals: diclofenac:[facility]  OTC medications:   Sleep Medications: Trazodone:H  Other medications not covered above: Vitamin D: H     SUBSTANCE HISTORY:   Past or current illegal drug use: Denies   Past or current ETOH use: Past   Nicotine/tobacco use: none   Daily Caffeine intake: 2-3 day     CURRENT FAMILY/SOCIAL SITUATION:  Past/Present occupation: Retired, was in the  then    Housing status: Assisted family   Emotional/Physical support: wife, sons  Safety Concerns: falls risk  Current stressors: denies      THE 4  As OF OPIOID MAINTENANCE ANALGESIA    Analgesia: Is pain relief clinically significant? YES   Activity: Is patient functional and able to perform Activities of Daily Living? YES   Adverse effects: Is patient free from adverse side effects from opiates? YES   Adherence to Rx protocol: Is patient adhering to Controlled Substance Agreement and taking medications ONLY as ordered? YES     Is Narcan prescribed for opiate use >50 MME daily or concurrent use of opiates and benzodiazepines? NO    [place] Board  Pharmacy Data Base Reviewed:    YES; No concern for abuse or misuse of controlled medications based on this report. Reviewed [facility] August 16, 2023- no concerning fills.      PHYSICAL EXAM    Vitals:    08/17/23 0857   BP: 134/60   Pulse: 53   SpO2: 93%   Weight: 89.8 kg (198 lb)       Constitutional: healthy, alert, and no distress A&O.   Patient is appropriate.  Psychiatric/mental status: Alert, without lethargy or stupor. Appropriate affect. 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     DIRE Score for ongoing opioid management is calculated as follows:    Diagnosis = 3 pts (advanced condition; severe pain/objective findings)    Intractability = 3 pts (patient fully engaged but inadequate response to treatments)    Risk        Psych = 1 pt (serious personality dysfunction/mental illness that significantly interferes with care)         Chem Hlth = 3 pts (no history of chemical dependency; not drug-focused)       Reliability = 3 pts (highly reliable with meds, appointments, treatments)       Social = 2 pts (reduction in some relationships/life rolls)       (Psych + Chem hlth + Reliability + Social) = 15    Efficacy = 1 pt (poor function; minimal pain relief despite mod/high med dose)    DIRE Score = 16        7-13: likely NOT suitable candidate for long-term opioid analgesia       14-21: may be a suitable candidate for long-term opioid analgesia     DIAGNOSTIC RESULTS:     No recent imaging has been done.       PAIN RELAVENT CONDITIONS:   1.  Post traumatic osteoarthritis multiple joints   2.  Chronic low back pain, mid back pain   3. Bilateral SI joint pain R>L   4.  Uncomplicated long term use of opiates.       DIAGNOSIS AND PLAN:     (G89.29) Chronic intractable pain  (primary encounter diagnosis)  (G62.9) Peripheral polyneuropathy  (M54.12,  G89.29) Chronic radicular cervical pain  (M15.3) Post-traumatic osteoarthritis of multiple joints  (M54.6,  G89.29) Chronic bilateral thoracic back pain  (M54.50,  G89.29) Chronic bilateral low back pain, unspecified whether sciatica present  Comment: Continue current plan of care. Any changes noted in patient instructions.    Plan: oxyCODONE (ROXICODONE) 5 MG tablet           PATIENT INSTRUCTIONS:     Diagnosis reviewed, treatment option addressed, and risk/benefits discussed.  Self-care instructions given.  I am recommending a multidisciplinary treatment plan to help this patient better manage pain.    Remember to request ALL medication refills 5 BUSINESS days before you run out.     30 minutes VIRTUAL follow-up with ALPESH Tucker NP-C in 3 months a CLINIC appt.   Other: Fentanyl patch placement must be signed off at every shift. If patches are lost, must be reported to Pain Center, KEN Tucker. Please use a Tegaderm to secure if needed.   Medication Management : Fentanyl stays the same, Oxycodone should now be scheduled three times per day, max 3 tablet per day    I have reviewed the note as documented above.  This accurately captures the substance of my conversation with the patient.  A total of 38 minutes of preparation, care, and consultation were spent on this visit today.     ALPESH Alamo NP-C  [facility]    (Information in italics and blue color are taken from previous pain and consulting medical providers notes and are documented as such)

## 2023-08-17 ENCOUNTER — OFFICE VISIT (OUTPATIENT)
Dept: PALLIATIVE MEDICINE | Facility: OTHER | Age: 83
End: 2023-08-17
Attending: NURSE PRACTITIONER
Payer: MEDICARE

## 2023-08-17 VITALS
DIASTOLIC BLOOD PRESSURE: 60 MMHG | OXYGEN SATURATION: 93 % | BODY MASS INDEX: 27.62 KG/M2 | WEIGHT: 198 LBS | HEART RATE: 53 BPM | SYSTOLIC BLOOD PRESSURE: 134 MMHG

## 2023-08-17 DIAGNOSIS — M15.3 POST-TRAUMATIC OSTEOARTHRITIS OF MULTIPLE JOINTS: ICD-10-CM

## 2023-08-17 DIAGNOSIS — G89.29 CHRONIC BILATERAL LOW BACK PAIN, UNSPECIFIED WHETHER SCIATICA PRESENT: ICD-10-CM

## 2023-08-17 DIAGNOSIS — M54.6 CHRONIC BILATERAL THORACIC BACK PAIN: ICD-10-CM

## 2023-08-17 DIAGNOSIS — G62.9 PERIPHERAL POLYNEUROPATHY: ICD-10-CM

## 2023-08-17 DIAGNOSIS — G89.29 CHRONIC INTRACTABLE PAIN: Primary | ICD-10-CM

## 2023-08-17 DIAGNOSIS — M54.12 CHRONIC RADICULAR CERVICAL PAIN: ICD-10-CM

## 2023-08-17 DIAGNOSIS — G89.29 CHRONIC RADICULAR CERVICAL PAIN: ICD-10-CM

## 2023-08-17 DIAGNOSIS — M54.50 CHRONIC BILATERAL LOW BACK PAIN, UNSPECIFIED WHETHER SCIATICA PRESENT: ICD-10-CM

## 2023-08-17 DIAGNOSIS — G89.29 CHRONIC BILATERAL THORACIC BACK PAIN: ICD-10-CM

## 2023-08-17 PROCEDURE — G0463 HOSPITAL OUTPT CLINIC VISIT: HCPCS | Performed by: NURSE PRACTITIONER

## 2023-08-17 PROCEDURE — 99214 OFFICE O/P EST MOD 30 MIN: CPT | Performed by: NURSE PRACTITIONER

## 2023-08-17 RX ORDER — OXYCODONE HYDROCHLORIDE 5 MG/1
5 TABLET ORAL EVERY 6 HOURS
Qty: 90 TABLET | Refills: 0 | Status: SHIPPED | OUTPATIENT
Start: 2023-08-17 | End: 2023-10-18

## 2023-08-17 ASSESSMENT — PAIN SCALES - GENERAL: PAINLEVEL: SEVERE PAIN (7)

## 2023-08-17 NOTE — PROGRESS NOTES
Patient presents to the clinic today for a visit with ALPESH Garrido CNP regarding Pain Management.          4/6/2023     1:04 PM 4/6/2023     1:05 PM 8/17/2023     8:58 AM   PEG Score   PEG Total Score 0.67 2 5       UDS/CSA- 02.16.2023    Medications- Fentanyl unsure when he last had one on.     Oxy he is not requesting the medication an isn't sure if he has it.     QUESTIONS:    Jazmine Best  [facility] Visit Facilitator

## 2023-08-17 NOTE — PATIENT INSTRUCTIONS
After Visit Instructions:     Thank you for coming to [facility] for your care. It is my goal to partner with you to help you reach your optimal state of health.   Continue daily self-care, identifying contributing factors, and monitoring variations in pain level. Continue to integrate self-care into your life.      30 minutes VIRTUAL follow-up with ALPESH Tucker NP-C in 3 months a CLINIC appt.   Other: Fentanyl patch placement must be signed off at every shift. If patches are lost, must be reported to Pain Center, KEN Tucker. Please use a Tegaderm to secure if needed.   Medication Management : Fentanyl stays the same, Oxycodone should now be scheduled three times per day, max 3 tablet per day      ALPESH Alamo NP-C  [facility]  [facility] - Monday and Friday  [place] [facility] - Tuesday  [place] - Thursday    Be sure to request ALL medication refills 5 days prior to the due date unless you will see your medical provider in an appointment before the due date.  This is YOUR responsibility. Do not expect same day refills. If you do not plan ahead you may run out of medications.   NO early refills are allowed. It is your responsibility to manage your medications responsibility and keep them safely stored. Lost or destroyed medications WILL NOT be replaced    Scheduling/Clinic telephone Number for ALL locations:  [phone]    After Hours On-Call Service:  [phone]    Call with any questions about your care and for scheduling assistance.   Calls are returned Monday through Friday between 8 AM and 4:00 PM. We usually get back to you within 2 business days depending on the issue/request.    If we are prescribing your medications:  For opioid medication refills, call the clinic or send a Prezma message 7 days in advance.  Please include:  Your name and date of birth.   Name of requested medication  Name of the pharmacy.  For non-opioid  medications, call your pharmacy directly to request a refill. Please allow 3-4 days to be processed.   Per [place] State Law:  All controlled substance prescriptions must be filled within 30 days of being written.    For those controlled substances allowing refills, pickup must occur within 30 days of last fill.      We believe regular attendance is key to your success in our program!    Any time you are unable to keep your appointment we ask that you call us at least 24 hours in advance to cancel.This will allow us to offer the appointment time to another patient.   Multiple missed appointments may lead to dismissal from the clinic.

## 2023-08-18 ENCOUNTER — DOCUMENTATION ONLY (OUTPATIENT)
Dept: OTHER | Facility: CLINIC | Age: 83
End: 2023-08-18
Payer: MEDICARE

## 2023-08-29 DIAGNOSIS — M54.6 CHRONIC BILATERAL THORACIC BACK PAIN: ICD-10-CM

## 2023-08-29 DIAGNOSIS — I25.718 CORONARY ARTERY DISEASE OF AUTOLOGOUS VEIN BYPASS GRAFT WITH STABLE ANGINA PECTORIS (H): ICD-10-CM

## 2023-08-29 DIAGNOSIS — F33.41 RECURRENT MAJOR DEPRESSIVE DISORDER, IN PARTIAL REMISSION (H): ICD-10-CM

## 2023-08-29 DIAGNOSIS — E78.5 HYPERLIPIDEMIA LDL GOAL <100: ICD-10-CM

## 2023-08-29 DIAGNOSIS — G89.29 CHRONIC BILATERAL THORACIC BACK PAIN: ICD-10-CM

## 2023-08-29 RX ORDER — SERTRALINE HYDROCHLORIDE 100 MG/1
100 TABLET, FILM COATED ORAL DAILY
Qty: 90 TABLET | Refills: 3 | Status: SHIPPED | OUTPATIENT
Start: 2023-08-29 | End: 2024-08-15

## 2023-08-29 RX ORDER — ATORVASTATIN CALCIUM 40 MG/1
TABLET, FILM COATED ORAL
Qty: 135 TABLET | Refills: 3 | Status: SHIPPED | OUTPATIENT
Start: 2023-08-29 | End: 2024-08-15

## 2023-08-29 RX ORDER — GABAPENTIN 300 MG/1
CAPSULE ORAL
Qty: 90 CAPSULE | Refills: 5 | Status: SHIPPED | OUTPATIENT
Start: 2023-08-29 | End: 2024-02-13

## 2023-08-29 RX ORDER — FUROSEMIDE 20 MG
TABLET ORAL
Qty: 30 TABLET | Refills: 11 | Status: SHIPPED | OUTPATIENT
Start: 2023-08-29 | End: 2023-11-17

## 2023-08-29 RX ORDER — BUSPIRONE HYDROCHLORIDE 15 MG/1
TABLET ORAL
Qty: 90 TABLET | Refills: 11 | Status: SHIPPED | OUTPATIENT
Start: 2023-08-29 | End: 2024-08-15

## 2023-09-12 DIAGNOSIS — M54.12 CHRONIC RADICULAR CERVICAL PAIN: ICD-10-CM

## 2023-09-12 DIAGNOSIS — G62.9 PERIPHERAL POLYNEUROPATHY: ICD-10-CM

## 2023-09-12 DIAGNOSIS — M54.6 CHRONIC BILATERAL THORACIC BACK PAIN: ICD-10-CM

## 2023-09-12 DIAGNOSIS — M15.3 POST-TRAUMATIC OSTEOARTHRITIS OF MULTIPLE JOINTS: ICD-10-CM

## 2023-09-12 DIAGNOSIS — G89.29 CHRONIC INTRACTABLE PAIN: ICD-10-CM

## 2023-09-12 DIAGNOSIS — G89.29 CHRONIC RADICULAR CERVICAL PAIN: ICD-10-CM

## 2023-09-12 DIAGNOSIS — G89.29 CHRONIC BILATERAL THORACIC BACK PAIN: ICD-10-CM

## 2023-09-12 DIAGNOSIS — M54.50 CHRONIC BILATERAL LOW BACK PAIN, UNSPECIFIED WHETHER SCIATICA PRESENT: ICD-10-CM

## 2023-09-12 DIAGNOSIS — G89.29 CHRONIC BILATERAL LOW BACK PAIN, UNSPECIFIED WHETHER SCIATICA PRESENT: ICD-10-CM

## 2023-09-12 RX ORDER — FENTANYL 12.5 UG/1
PATCH TRANSDERMAL
Qty: 15 PATCH | Refills: 0 | OUTPATIENT
Start: 2023-09-12

## 2023-09-19 RX ORDER — FENTANYL 12.5 UG/1
1 PATCH TRANSDERMAL
Qty: 15 PATCH | Refills: 0 | Status: SHIPPED | OUTPATIENT
Start: 2023-09-19 | End: 2023-10-24

## 2023-09-19 NOTE — TELEPHONE ENCOUNTER
Script Eprescribed to pharmacy    Signed Prescriptions:                        Disp   Refills    fentaNYL (DURAGESIC) 12 mcg/hr 72 hr patch 15 pat*0        Sig: Place 1 patch onto the skin every 48 hours Ok to           refill on 09/19/23 to begin using on 09/21/23. 30           days supply for chronic pain.  Authorizing Provider: LUCIAN HOPPER    Refused Prescriptions:                       Disp   Refills    fentaNYL (DURAGESIC) 12 mcg/hr 72 hr patch*15 pat*0        Sig: PLACE 1 PATCH ONTO THE SKINEVERY 48 HOURS FILL           8/20/23START 08/22/23  Refused By: JOHNNY EVANGELISTA  Reason for Refusal: Originating/Specialty Provider to approve      ALPESH Alamo, NP-C  [facility] [place]

## 2023-09-19 NOTE — TELEPHONE ENCOUNTER
Received refill request for:    fentaNYL (DURAGESIC) 12 mcg/hr 72 hr patch  Patient received a partial refill of # 10 patches  Patient received the remaining # 5 patches on 9/8/23  Patient will need this refilled ASAP    oxyCODONE (ROXICODONE) 5 MG tablet  Patient got a partial fill of #30 tablets on 9/7/23  Patient has # 60 tablets on file at pharmacy to fill    Patient's last office/virtual visit by prescribing provider on 8/17/23  Next office/virtual appointment scheduled for 11/17/23    Last urine drug screen date 2/16/23  Current opioid agreement on file? Yes Date of opioid agreement: 2/16/23    E-prescribe to:     [facility] ONLY #[id number] - [place], [place] - [address]    Will route to nursing [place] for review and preparation of prescription(s).     Per Anne Herman:  RN: Please set up refills. Please let his son or the assisted living know that Osvaldo will continue to get refills from me until at least his next appt on 11/17/23 at 3:45 pm.   Thanks, DV

## 2023-09-19 NOTE — TELEPHONE ENCOUNTER
Sina is requesting that the fentanyl is refilled. It was called to PCP, instead of Pain Center. Osvaldo has Alzheimer's and his refills are managed by his asst living and/or his son, Santo COLEMAN:  Please set up and forward to RN. Please check with his son to see if he also needs oxycodone refilled.     RN: Please set up refills. Please let his son or the assisted living know that Osvaldo will continue to get refills from me until at least his next appt on 11/17/23 at 3:45 pm. Thanks, DV

## 2023-09-19 NOTE — TELEPHONE ENCOUNTER
Medication refill information reviewed. Per note below oxycodone 5 mg is already on file at the pharmacy. Spoke with son  Rajiv Gomez and provided the update below. He will be attending the next appointment with his father.     Due date for fentaNYL (DURAGESIC) 12 mcg/hr 72 hr patch is 09/21/23     Prescriptions prepped for review.     Will route to provider.

## 2023-10-05 DIAGNOSIS — E03.4 HYPOTHYROIDISM DUE TO ACQUIRED ATROPHY OF THYROID: ICD-10-CM

## 2023-10-05 DIAGNOSIS — I25.718 CORONARY ARTERY DISEASE OF AUTOLOGOUS VEIN BYPASS GRAFT WITH STABLE ANGINA PECTORIS (H): ICD-10-CM

## 2023-10-05 RX ORDER — LEVOTHYROXINE SODIUM 50 UG/1
TABLET ORAL
Qty: 90 TABLET | Refills: 1 | Status: SHIPPED | OUTPATIENT
Start: 2023-10-05 | End: 2024-03-27

## 2023-10-05 RX ORDER — OLMESARTAN MEDOXOMIL 20 MG/1
TABLET ORAL
Qty: 90 TABLET | Refills: 1 | Status: SHIPPED | OUTPATIENT
Start: 2023-10-05 | End: 2024-03-27

## 2023-10-05 NOTE — TELEPHONE ENCOUNTER
"Routing refill request to provider for review/approval because:  Provider to determine refill  Not sure who is following patient - did patient transfer care to Dr. Talavera?         Requested Prescriptions   Pending Prescriptions Disp Refills    levothyroxine (SYNTHROID/LEVOTHROID) 50 MCG tablet [Pharmacy Med Name: LEVOTHYROXINE 50MCG TABLET] 90 tablet 1     Sig: TAKE 1 TAB BY MOUTH ONCE DAILY       Thyroid Protocol Failed - 10/5/2023 10:03 AM        Failed - Normal TSH on file in past 12 months     Recent Labs   Lab Test 03/19/23  1509   TSH 5.52*              Passed - Patient is 12 years or older        Passed - Recent (12 mo) or future (30 days) visit within the authorizing provider's specialty     Patient has had an office visit with the authorizing provider or a provider within the authorizing providers department within the previous 12 mos or has a future within next 30 days. See \"Patient Info\" tab in inbasket, or \"Choose Columns\" in Meds & Orders section of the refill encounter.              Passed - Medication is active on med list          olmesartan (BENICAR) 20 MG tablet [Pharmacy Med Name: OLMESARTAN 20MG TABLET] 90 tablet 1     Sig: TAKE 1 TAB BY MOUTH ONCE DAILY       Angiotensin-II Receptors Passed - 10/5/2023 10:03 AM        Passed - Last blood pressure under 140/90 in past 12 months     BP Readings from Last 3 Encounters:   08/17/23 134/60   07/12/23 (!) 146/58   04/06/23 (!) 150/70                 Passed - Recent (12 mo) or future (30 days) visit within the authorizing provider's specialty     Patient has had an office visit with the authorizing provider or a provider within the authorizing providers department within the previous 12 mos or has a future within next 30 days. See \"Patient Info\" tab in inbasket, or \"Choose Columns\" in Meds & Orders section of the refill encounter.              Passed - Medication is active on med list        Passed - Patient is age 18 or older        Passed - Normal " serum creatinine on file in past 12 months     Recent Labs   Lab Test 04/04/23  1207   CR 0.71       Ok to refill medication if creatinine is low          Passed - Normal serum potassium on file in past 12 months     Recent Labs   Lab Test 04/04/23  1207   POTASSIUM 4.0                             Kike Flynn RN 10/05/23 10:51 AM

## 2023-10-18 ENCOUNTER — TELEPHONE (OUTPATIENT)
Dept: PALLIATIVE MEDICINE | Facility: OTHER | Age: 83
End: 2023-10-18
Payer: MEDICARE

## 2023-10-18 DIAGNOSIS — M15.3 POST-TRAUMATIC OSTEOARTHRITIS OF MULTIPLE JOINTS: ICD-10-CM

## 2023-10-18 DIAGNOSIS — G62.9 PERIPHERAL POLYNEUROPATHY: ICD-10-CM

## 2023-10-18 DIAGNOSIS — G89.29 CHRONIC BILATERAL LOW BACK PAIN, UNSPECIFIED WHETHER SCIATICA PRESENT: ICD-10-CM

## 2023-10-18 DIAGNOSIS — M54.50 CHRONIC BILATERAL LOW BACK PAIN, UNSPECIFIED WHETHER SCIATICA PRESENT: ICD-10-CM

## 2023-10-18 DIAGNOSIS — G89.29 CHRONIC BILATERAL THORACIC BACK PAIN: ICD-10-CM

## 2023-10-18 DIAGNOSIS — G89.29 CHRONIC INTRACTABLE PAIN: ICD-10-CM

## 2023-10-18 DIAGNOSIS — M54.6 CHRONIC BILATERAL THORACIC BACK PAIN: ICD-10-CM

## 2023-10-18 DIAGNOSIS — G89.29 CHRONIC RADICULAR CERVICAL PAIN: ICD-10-CM

## 2023-10-18 DIAGNOSIS — M54.12 CHRONIC RADICULAR CERVICAL PAIN: ICD-10-CM

## 2023-10-18 RX ORDER — OXYCODONE HYDROCHLORIDE 5 MG/1
5 TABLET ORAL EVERY 6 HOURS
Qty: 90 TABLET | Refills: 0 | Status: SHIPPED | OUTPATIENT
Start: 2023-10-18 | End: 2023-11-17

## 2023-10-18 RX ORDER — OXYCODONE HYDROCHLORIDE 5 MG/1
TABLET ORAL
Qty: 90 TABLET | Refills: 0 | OUTPATIENT
Start: 2023-10-18

## 2023-10-18 NOTE — TELEPHONE ENCOUNTER
Received refill request for:    oxyCODONE (ROXICODONE) 5 MG tablet    And  fentaNYL (DURAGESIC) 12 mcg/hr 72 hr patch     Last dispensed from pharmacy on 9/7/23-oxycodone  10/09/23-Fentanyl    Patient's last office/virtual visit by prescribing provider on 8/17/23  Next office/virtual appointment scheduled for 11/17/23    Last urine drug screen date 2/16/23  Current opioid agreement on file? Yes Date of opioid agreement: 2/16/23    E-prescribe to:      [facility] Only   [address] [address]  Phone: [phone] Fax: [phone]        Will route to nursing pool for review and preparation of prescription(s).

## 2023-10-18 NOTE — TELEPHONE ENCOUNTER
Medication refill information reviewed.     Due date for oxyCODONE (ROXICODONE) 5 MG tablet    is 10/20/23     Prescriptions prepped for review.     Will route to provider.

## 2023-10-18 NOTE — TELEPHONE ENCOUNTER
Name from pharmacy: OXYCODONE 5MG TABLET          Will file in chart as: oxyCODONE (ROXICODONE) 5 MG tablet    Sig: TAKE 1 TABLET (5MG) BY MOUTH EVERY 6 HOURS MAX 3/DAY. OK TO FILL ON 8/21/23 AND BEGIN USING ON 8/23/23.     important  Maximum MME cannot be calculated for this prescription. Enter discrete sig details to calculate maximum MME.       Disp: 90 tablet    Refills: 0    Start: 10/18/2023    Earliest Fill Date: 10/18/2023    Class: E-Prescribe    Non-formulary For: Chronic intractable pain; Peripheral polyneuropathy; Chronic radicular cervical pain; Post-traumatic osteoarthritis of multiple joints; Chronic bilateral thoracic back pain; Chronic bilateral low back pain, unspecified whether sciatica present    To pharmacy: PLS AUTH REFILLS THANKS    Last ordered: 2 months ago (8/17/2023) by ALPESH Garrido CNP    Last refill: 9/7/2023    Rx #: [id number]       To be filled at: [facility] Only #[id number] [facility] [address] [facility]

## 2023-10-20 RX ORDER — FENTANYL 12.5 UG/1
1 PATCH TRANSDERMAL
Qty: 15 PATCH | Refills: 0 | Status: CANCELLED | OUTPATIENT
Start: 2023-10-20

## 2023-10-20 NOTE — TELEPHONE ENCOUNTER
Will process Fentanyl in a separate encounter. Fentanyl was not part of this original request and was added on after the encounter was closed and finished.     MARCO TorresN, RN  Care Coordinator  [facility] [place]

## 2023-10-30 DIAGNOSIS — F02.80 MAJOR NEUROCOGNITIVE DISORDER DUE TO ALZHEIMER'S DISEASE (H): ICD-10-CM

## 2023-10-30 DIAGNOSIS — G30.9 MAJOR NEUROCOGNITIVE DISORDER DUE TO ALZHEIMER'S DISEASE (H): ICD-10-CM

## 2023-10-30 DIAGNOSIS — R41.89 SUBJECTIVE MEMORY COMPLAINTS: ICD-10-CM

## 2023-10-30 RX ORDER — DONEPEZIL HYDROCHLORIDE 10 MG/1
10 TABLET, FILM COATED ORAL AT BEDTIME
Qty: 90 TABLET | Refills: 1 | Status: SHIPPED | OUTPATIENT
Start: 2023-10-30 | End: 2023-12-04

## 2023-10-30 NOTE — TELEPHONE ENCOUNTER
"      Requested Prescriptions   Pending Prescriptions Disp Refills    donepezil (ARICEPT) 10 MG tablet [Pharmacy Med Name: DONEPEZIL 10MG TABLET] 90 tablet 1     Sig: TAKE 1 TABLET (10 MG) BY MOUTH AT BEDTIME       Miscellaneous Dementia Agents Passed - 10/30/2023  2:15 AM        Passed - Recent (12 mo) or future (30 days) visit within the authorizing provider's specialty     Patient has had an office visit with the authorizing provider or a provider within the authorizing providers department within the previous 12 mos or has a future within next 30 days. See \"Patient Info\" tab in inbasket, or \"Choose Columns\" in Meds & Orders section of the refill encounter.              Passed - Medication is active on med list        Passed - Patient is 18 years of age or older                 Maryann Son RN 10/30/23 2:12 PM   "

## 2023-11-16 DIAGNOSIS — G89.29 CHRONIC BILATERAL THORACIC BACK PAIN: ICD-10-CM

## 2023-11-16 DIAGNOSIS — G89.29 CHRONIC BILATERAL LOW BACK PAIN, UNSPECIFIED WHETHER SCIATICA PRESENT: ICD-10-CM

## 2023-11-16 DIAGNOSIS — M54.50 CHRONIC BILATERAL LOW BACK PAIN, UNSPECIFIED WHETHER SCIATICA PRESENT: ICD-10-CM

## 2023-11-16 DIAGNOSIS — G89.29 CHRONIC RADICULAR CERVICAL PAIN: ICD-10-CM

## 2023-11-16 DIAGNOSIS — M54.12 CHRONIC RADICULAR CERVICAL PAIN: ICD-10-CM

## 2023-11-16 DIAGNOSIS — M15.3 POST-TRAUMATIC OSTEOARTHRITIS OF MULTIPLE JOINTS: ICD-10-CM

## 2023-11-16 DIAGNOSIS — G89.29 CHRONIC INTRACTABLE PAIN: ICD-10-CM

## 2023-11-16 DIAGNOSIS — G62.9 PERIPHERAL POLYNEUROPATHY: ICD-10-CM

## 2023-11-16 DIAGNOSIS — M54.6 CHRONIC BILATERAL THORACIC BACK PAIN: ICD-10-CM

## 2023-11-16 RX ORDER — FENTANYL 12.5 UG/1
1 PATCH TRANSDERMAL
Qty: 15 PATCH | Refills: 0 | Status: SHIPPED | OUTPATIENT
Start: 2023-11-16 | End: 2023-12-18

## 2023-11-16 NOTE — TELEPHONE ENCOUNTER
Pending Prescriptions:                       Disp   Refills    fentaNYL (DURAGESIC) 12 mcg/hr 72 hr patch15 pat*0            Sig: Place 1 patch onto the skin every 48 hours Ok to           Fill 11/21/23 and Start on 11/23/23. 30 days           supply for chronic pain.      [facility] #[id number] - [place], [place] - [address]

## 2023-11-16 NOTE — TELEPHONE ENCOUNTER
Received call from patient requesting refill(s) of fentaNYL (DURAGESIC) 12 mcg/hr 72 hr patch      Last dispensed from pharmacy on 10/24/23    Patient's last office/virtual visit by prescribing provider on 08/17/23  Next office/virtual appointment scheduled for 11/17/23    Last urine drug screen date 02/16/23  Current opioid agreement on file (completed within the last year) Yes Date of opioid agreement: 02/16/23    E-prescribe to pharmacy-[facility] Only #[id number] - [facility] [place] - [address]     Will route to nursing Warren for review and preparation of prescription(s).

## 2023-11-17 ENCOUNTER — VIRTUAL VISIT (OUTPATIENT)
Dept: PALLIATIVE MEDICINE | Facility: OTHER | Age: 83
End: 2023-11-17
Attending: NURSE PRACTITIONER
Payer: MEDICARE

## 2023-11-17 DIAGNOSIS — M54.12 CHRONIC RADICULAR CERVICAL PAIN: ICD-10-CM

## 2023-11-17 DIAGNOSIS — G89.29 CHRONIC RADICULAR CERVICAL PAIN: ICD-10-CM

## 2023-11-17 DIAGNOSIS — G89.29 CHRONIC BILATERAL LOW BACK PAIN, UNSPECIFIED WHETHER SCIATICA PRESENT: ICD-10-CM

## 2023-11-17 DIAGNOSIS — G62.9 PERIPHERAL POLYNEUROPATHY: ICD-10-CM

## 2023-11-17 DIAGNOSIS — G89.29 CHRONIC INTRACTABLE PAIN: Primary | ICD-10-CM

## 2023-11-17 DIAGNOSIS — M54.50 CHRONIC BILATERAL LOW BACK PAIN, UNSPECIFIED WHETHER SCIATICA PRESENT: ICD-10-CM

## 2023-11-17 DIAGNOSIS — M54.6 CHRONIC BILATERAL THORACIC BACK PAIN: ICD-10-CM

## 2023-11-17 DIAGNOSIS — G89.29 CHRONIC BILATERAL THORACIC BACK PAIN: ICD-10-CM

## 2023-11-17 DIAGNOSIS — Z79.899 HIGH RISK MEDICATION USE: ICD-10-CM

## 2023-11-17 DIAGNOSIS — M15.3 POST-TRAUMATIC OSTEOARTHRITIS OF MULTIPLE JOINTS: ICD-10-CM

## 2023-11-17 PROCEDURE — 99214 OFFICE O/P EST MOD 30 MIN: CPT | Mod: 95 | Performed by: NURSE PRACTITIONER

## 2023-11-17 ASSESSMENT — PAIN SCALES - GENERAL: PAINLEVEL: SEVERE PAIN (7)

## 2023-11-17 NOTE — TELEPHONE ENCOUNTER
Script Eprescribed to pharmacy    Signed Prescriptions:                        Disp   Refills    fentaNYL (DURAGESIC) 12 mcg/hr 72 hr patch 15 pat*0        Sig: Place 1 patch onto the skin every 48 hours Ok to Fill           11/21/23 and Start on 11/23/23. 30 days supply           for chronic pain.  Authorizing Provider: LUCIAN HOPPER [facility] ONLY #[id number] [facility] [address] [facility]      ALPESH Alamo, NP-C  [facility] [place]

## 2023-11-17 NOTE — PATIENT INSTRUCTIONS
After Visit Instructions:     Thank you for coming to [facility] for your care. It is my goal to partner with you to help you reach your optimal state of health.   Continue daily self-care, identifying contributing factors, and monitoring variations in pain level. Continue to integrate self-care into your life.      30 minute Clinic follow-up with ALPESH Tucker NP-C in 3 months .  Ok to schedule up to three follow up appts at a time, alternating virtual and clinic appointments.     Medication Management :   Fentanyl refilled   Oxycodone 7.5 mg up for times per day.       ALPESH Alamo NP-C  [facility]  [facility] - Monday and Friday  [facility]) - Tuesday  [facility] - Thursday    Be sure to request ALL medication refills 5 days prior to the due date unless you will see your medical provider in an appointment before the due date.  This is YOUR responsibility. Do not expect same day refills. If you do not plan ahead you may run out of medications.   NO early refills are allowed. It is your responsibility to manage your medications responsibility and keep them safely stored. Lost or destroyed medications WILL NOT be replaced    Scheduling/Clinic telephone Number for ALL locations:  [phone]    After Hours On-Call Service:  [phone]    Call with any questions about your care and for scheduling assistance.   Calls are returned Monday through Friday between 8 AM and 4:00 PM. We usually get back to you within 2 business days depending on the issue/request.    If we are prescribing your medications:  For opioid medication refills, call the clinic or send a [employer] message 7 days in advance.  Please include:  Your name and date of birth.   Name of requested medication  Name of the pharmacy.  For non-opioid medications, call your pharmacy directly to request a refill. Please allow 3-4 days to be processed.   Per [place] State Law:  All controlled substance  prescriptions must be filled within 30 days of being written.    For those controlled substances allowing refills, pickup must occur within 30 days of last fill.      We believe regular attendance is key to your success in our program!    Any time you are unable to keep your appointment we ask that you call us at least 24 hours in advance to cancel.This will allow us to offer the appointment time to another patient.   Multiple missed appointments may lead to dismissal from the clinic.

## 2023-11-17 NOTE — PROGRESS NOTES
[facility]      Sina Gomez is a 83 year old male who is being evaluated via a billable virtual visit.      VIDEO VISIT   How would you like to obtain your AVS? MyChart  If you are dropped from the video visit, the video invite should be resent to: Text to cell phone: [phone] Av's phone  Will anyone else be joining your video visit? NO,  Is patient CURRENTLY in [place]? YES  If patient encounters technical issues they should call [phone]    Video-Visit Details  Type of service:  Video Visit  Video Start Time: 4:05 PM  Video End Time: 4:19 PM  Total Face to Face Time: 15 minutes   Originating Location (pt. Location): Home  Distant Location (provider location):  [facility]   Platform used for Video Visit: LuannSteve    {  CHIEF COMPLAINT: Chronic pain    INTERVAL HISTORY:  Last seen on 8/17/23.        Recommendations/plan at the last visit included:  30 minutes VIRTUAL follow-up with ALPESH Tucker NP-C in 3 months a CLINIC appt.   Other: Fentanyl patch placement must be signed off at every shift. If patches are lost, must be reported to Pain Center, K[facility] Tucker. Please use a Tegaderm to secure if needed.   Medication Management : Fentanyl stays the same, Oxycodone should now be scheduled three times per day, max 3 tablet per day    Since last visit:   - Still having difficult breakthrough pain between doses of Oxycodone.   - Concerned about anxiety not being managed.     Pain Information Today: Score: Severe Pain (7)/10. Location of pain: back pain     Annual Requirements last collected: February 16, 2023     Current Pain Relevant Medications:    Acetaminophen 500 mg 1-2 tabs q 8 h PRN  Buspar 15 mg TID   Diclofenac gel 1% BID PRN   Gabapentin 100 mg: Starting titration up to 300 mg TID.   Trazodone 100 mg at HS      Current Controlled Substance Medications:   Fentanyl 12 mcg change patch q48h = 30 MME/day  Oxycodone/APAP IR  7.5/325 mg QID PRN  Max 45 MME/day              Total opiate dose = 75 MME/day      Previous Pain Relevant Medications: (H--helped; HI--Helped initially; SWH--Somewhat helpful; NH--No help; W--worse; SE--side effects; ?--Unsure if helpful)   Opiates: Oxycodone:H, Fentanyl:?,   NSAIDS:   Migraine medications:   Muscle Relaxants:   Neuropathics:   Anti-depressants for pain:         Anxiety medications: Buspar:H,           Topicals: diclofenac:[facility]  OTC medications:   Sleep Medications: Trazodone:H  Other medications not covered above: Vitamin D: H     SUBSTANCE HISTORY:   Past or current illegal drug use: Denies   Past or current ETOH use: Past   Nicotine/tobacco use: none   Daily Caffeine intake: 2-3 day     CURRENT FAMILY/SOCIAL SITUATION:  Past/Present occupation: Retired, was in the  then    Housing status: Assisted family   Emotional/Physical support: wife, sons  Safety Concerns: falls risk  Current stressors: denies      THE 4 As OF OPIOID MAINTENANCE ANALGESIA    Analgesia: Is pain relief clinically significant? YES   Activity: Is patient functional and able to perform Activities of Daily Living? YES   Adverse effects: Is patient free from adverse side effects from opiates? YES   Adherence to Rx protocol: Is patient adhering to Controlled Substance Agreement and taking medications ONLY as ordered? YES     Is Narcan prescribed for opiate use >50 MME daily or concurrent use of opiates and benzodiazepines? NO       [place] Board of Pharmacy Data Base Reviewed:    YES; As expected, no concern for misuse/abuse of controlled medications based on this report. Reviewed [facility] November 16, 2023- no concerning fills.    _______________________________________________      Physical Exam and Vital Signs not completed due to virtual visit.     General: Verbal, alert and no distress   Psychiatric:  affect and mood normal, mentation appears normal.     DIRE Score for ongoing opioid management is calculated as  follows:    Diagnosis = 3 pts (advanced condition; severe pain/objective findings)    Intractability = 3 pts (patient fully engaged but inadequate response to treatments)    Risk        Psych = 1 pt (serious personality dysfunction/mental illness that significantly interferes with care)         Chem Hlth = 3 pts (no history of chemical dependency; not drug-focused)       Reliability = 3 pts (highly reliable with meds, appointments, treatments)       Social = 2 pts (reduction in some relationships/life rolls)       (Psych + Chem hlth + Reliability + Social) = 15    Efficacy = 1 pt (poor function; minimal pain relief despite mod/high med dose)    DIRE Score = 16        7-13: likely NOT suitable candidate for long-term opioid analgesia       14-21: may be a suitable candidate for long-term opioid analgesia     DIAGNOSTIC RESULTS:     No recent imaging has been done.      PAIN RELAVENT CONDITIONS:   1.  Post traumatic osteoarthritis multiple joints   2.  Chronic low back pain, mid back pain   3. Bilateral SI joint pain R>L   4.  Uncomplicated long term use of opiates.    ASSESSMENT AND PLAN    (G89.29) Chronic intractable pain  (primary encounter diagnosis)  (G62.9) Peripheral polyneuropathy  (M54.12,  G89.29) Chronic radicular cervical pain  (M15.3) Post-traumatic osteoarthritis of multiple joints  (M54.6,  G89.29) Chronic bilateral thoracic back pain  (M54.50,  G89.29) Chronic bilateral low back pain, unspecified whether sciatica present  Comment: Pain control has not adequate recently. Increasing Immediate release opiate dose  to Percocet 7.5/325 mg from Oxycodone 5 mg.   Plan: Percocet 7.5/325 mg     (Z79.899) High risk medication use  Comment:   Plan: naloxone (NARCAN) 4 MG/0.1ML nasal spray         PATIENT INSTRUCTIONS:     Diagnosis reviewed, treatment option addressed, and risk/benefits discussed.  Self-care instructions given.  I am recommending a multidisciplinary treatment plan to help this patient better  manage pain.    Remember to request ALL medication refills 5-7 BUSINESS days before you run out.     30 minute Clinic follow-up with ALPESH Tucker NP-C in 3 months .  Ok to schedule up to three follow up appts at a time, alternating virtual and clinic appointments.     Medication Management :   Fentanyl refilled   Oxycodone /APAP 7.5 mg  QID PRN    I have reviewed the note as documented above.  This accurately captures the substance of my conversation with the patient.    BILLING TIME DOCUMENTATION:   TOTAL TIME on the date of service includes:   Time spent preparing to see the patient: 0 minutes (reviewing records and tests)  Time spend face to face with the patient: 15 minutes  Time spent ordering tests, medications, procedures and referrals: 0 minutes  Time spent Referring and communicating with other healthcare professionals: 0 minutes  Documenting clinical information in Epic: 0 minutes    The total TIME spent on this patient on the day of the appointment was 15 minutes.     ALPESH Alamo, NPJonnyC  [facility]    (Information in italics and blue color are taken from previous pain and consulting medical providers notes and are documented as such)

## 2023-11-22 DIAGNOSIS — G89.29 CHRONIC BILATERAL THORACIC BACK PAIN: ICD-10-CM

## 2023-11-22 DIAGNOSIS — M54.6 CHRONIC BILATERAL THORACIC BACK PAIN: ICD-10-CM

## 2023-11-22 DIAGNOSIS — M54.50 CHRONIC BILATERAL LOW BACK PAIN, UNSPECIFIED WHETHER SCIATICA PRESENT: ICD-10-CM

## 2023-11-22 DIAGNOSIS — G89.29 CHRONIC RADICULAR CERVICAL PAIN: ICD-10-CM

## 2023-11-22 DIAGNOSIS — G89.29 CHRONIC INTRACTABLE PAIN: Primary | ICD-10-CM

## 2023-11-22 DIAGNOSIS — M54.12 CHRONIC RADICULAR CERVICAL PAIN: ICD-10-CM

## 2023-11-22 DIAGNOSIS — M19.012 LOCALIZED PRIMARY OSTEOARTHRITIS OF LEFT SHOULDER REGION: ICD-10-CM

## 2023-11-22 DIAGNOSIS — G62.9 PERIPHERAL POLYNEUROPATHY: ICD-10-CM

## 2023-11-22 DIAGNOSIS — G89.29 CHRONIC BILATERAL LOW BACK PAIN, UNSPECIFIED WHETHER SCIATICA PRESENT: ICD-10-CM

## 2023-11-22 RX ORDER — OXYCODONE HYDROCHLORIDE 5 MG/1
7.5 TABLET ORAL EVERY 6 HOURS
Qty: 180 TABLET | Refills: 0 | Status: CANCELLED | OUTPATIENT
Start: 2023-11-22

## 2023-11-22 RX ORDER — OXYCODONE AND ACETAMINOPHEN 7.5; 325 MG/1; MG/1
1 TABLET ORAL EVERY 6 HOURS
Qty: 120 TABLET | Refills: 0 | Status: SHIPPED | OUTPATIENT
Start: 2023-11-22 | End: 2023-12-22

## 2023-11-22 NOTE — TELEPHONE ENCOUNTER
Oxycodone 7.5 MG tablet is not a thing. This dose of this medication is not manufactured or available at any pharmacy anywhere.     Canceled both the 5 MG tablet and the 7.5 MG at the pharmacy.    Pended new script below. Please review.     MARCO TorresN, RN  Care Coordinator  [facility] [place]

## 2023-11-22 NOTE — TELEPHONE ENCOUNTER
Script Eprescribed to pharmacy    Signed Prescriptions:                        Disp   Refills    oxyCODONE-acetaminophen (PERCOCET) 7.5-325*120 ta*0        Sig: Take 1 tablet by mouth every 6 hours Hold if patient           is sedated. Patient may refuse. Ok to fill/start           November 22, 2023  Authorizing Provider: LUCIAN HOPPER [facility] ONLY #029 [facility] [address] [facility]      ALPESH Alamo, NP-C  [facility] [place]

## 2023-11-22 NOTE — TELEPHONE ENCOUNTER
[facility] Call Center    Phone Message    May a detailed message be left on voicemail: yes     Reason for Call: Medication Question or concern regarding medication   Prescription Clarification  Name of Medication: oxyCODONE HCl 7.5 MG TABS  oxyCODONE (ROXICODONE) 5 MG tablet  Prescribing Provider: Anne Herman   Pharmacy: [facility] ONLY #[id number] [facility] [address]   What on the order needs clarification?   Pharmacy is calling stating that they had received a prescription for the 7.5mg. The did not get a discontinuation for the 5mg. They are wondering if the patient should be on both of these medications. Please call pharmacy back to clarify.       Action Taken: Message routed to:  Other: [facility]    Travel Screening: Not Applicable

## 2023-12-04 ENCOUNTER — OFFICE VISIT (OUTPATIENT)
Dept: NEUROLOGY | Facility: CLINIC | Age: 83
End: 2023-12-04
Payer: MEDICARE

## 2023-12-04 VITALS
DIASTOLIC BLOOD PRESSURE: 72 MMHG | HEART RATE: 56 BPM | SYSTOLIC BLOOD PRESSURE: 138 MMHG | RESPIRATION RATE: 16 BRPM | BODY MASS INDEX: 28.73 KG/M2 | WEIGHT: 206 LBS

## 2023-12-04 DIAGNOSIS — R41.89 SUBJECTIVE MEMORY COMPLAINTS: Primary | ICD-10-CM

## 2023-12-04 DIAGNOSIS — I63.9 CEREBROVASCULAR ACCIDENT (CVA), UNSPECIFIED MECHANISM (H): ICD-10-CM

## 2023-12-04 DIAGNOSIS — G30.9 MAJOR NEUROCOGNITIVE DISORDER DUE TO ALZHEIMER'S DISEASE (H): ICD-10-CM

## 2023-12-04 DIAGNOSIS — F02.80 MAJOR NEUROCOGNITIVE DISORDER DUE TO ALZHEIMER'S DISEASE (H): ICD-10-CM

## 2023-12-04 DIAGNOSIS — R73.9 HYPERGLYCEMIA: ICD-10-CM

## 2023-12-04 DIAGNOSIS — Z87.820 HX OF TRAUMATIC BRAIN INJURY: ICD-10-CM

## 2023-12-04 PROCEDURE — 99214 OFFICE O/P EST MOD 30 MIN: CPT | Performed by: PSYCHIATRY & NEUROLOGY

## 2023-12-04 RX ORDER — DONEPEZIL HYDROCHLORIDE 10 MG/1
10 TABLET, FILM COATED ORAL AT BEDTIME
Qty: 90 TABLET | Refills: 1 | Status: SHIPPED | OUTPATIENT
Start: 2023-12-04 | End: 2024-05-29

## 2023-12-04 ASSESSMENT — MONTREAL COGNITIVE ASSESSMENT (MOCA)
9. REPEAT EACH SENTENCE: 2
WHAT LEVEL OF EDUCATION WAS ATTAINED: 0
11. FOR EACH PAIR OF WORDS, WHAT CATEGORY DO THEY BELONG TO (OUT OF 2): 2
7. [VIGILENCE] TAP WHEN HEARING DESIGNATED LETTER: 1
6. READ LIST OF DIGITS [FORWARD/BACKWARD]: 2
12. MEMORY INDEX SCORE: 0
4. NAME EACH OF THE THREE ANIMALS SHOWN: 3
VISUOSPATIAL/EXECUTIVE SUBSCORE: 3
WHAT IS THE TOTAL SCORE (OUT OF 30): 18
13. ORIENTATION SUBSCORE: 3
10. [FLUENCY] NAME WORDS STARTING WITH DESIGNATED LETTER: 0
8. SERIAL SUBTRACTION OF 7S: 2
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
## 2023-12-14 ENCOUNTER — LAB (OUTPATIENT)
Dept: LAB | Facility: CLINIC | Age: 83
End: 2023-12-14
Payer: MEDICARE

## 2023-12-14 DIAGNOSIS — R73.9 HYPERGLYCEMIA: ICD-10-CM

## 2023-12-14 DIAGNOSIS — I63.9 CEREBROVASCULAR ACCIDENT (CVA), UNSPECIFIED MECHANISM (H): ICD-10-CM

## 2023-12-14 LAB
CHOLEST SERPL-MCNC: 126 MG/DL
FASTING STATUS PATIENT QL REPORTED: NO
HBA1C MFR BLD: 5.6 % (ref 0–5.6)
HDLC SERPL-MCNC: 51 MG/DL
LDLC SERPL CALC-MCNC: 57 MG/DL
NONHDLC SERPL-MCNC: 75 MG/DL
TRIGL SERPL-MCNC: 88 MG/DL

## 2023-12-14 PROCEDURE — 80061 LIPID PANEL: CPT | Mod: GZ

## 2023-12-14 PROCEDURE — 83036 HEMOGLOBIN GLYCOSYLATED A1C: CPT

## 2023-12-14 PROCEDURE — 36415 COLL VENOUS BLD VENIPUNCTURE: CPT

## 2023-12-18 DIAGNOSIS — G89.29 CHRONIC BILATERAL THORACIC BACK PAIN: ICD-10-CM

## 2023-12-18 DIAGNOSIS — M54.6 CHRONIC BILATERAL THORACIC BACK PAIN: ICD-10-CM

## 2023-12-18 DIAGNOSIS — G89.29 CHRONIC RADICULAR CERVICAL PAIN: ICD-10-CM

## 2023-12-18 DIAGNOSIS — G89.29 CHRONIC INTRACTABLE PAIN: ICD-10-CM

## 2023-12-18 DIAGNOSIS — M54.12 CHRONIC RADICULAR CERVICAL PAIN: ICD-10-CM

## 2023-12-18 DIAGNOSIS — G62.9 PERIPHERAL POLYNEUROPATHY: ICD-10-CM

## 2023-12-18 DIAGNOSIS — G89.29 CHRONIC BILATERAL LOW BACK PAIN, UNSPECIFIED WHETHER SCIATICA PRESENT: ICD-10-CM

## 2023-12-18 DIAGNOSIS — M15.3 POST-TRAUMATIC OSTEOARTHRITIS OF MULTIPLE JOINTS: ICD-10-CM

## 2023-12-18 DIAGNOSIS — M54.50 CHRONIC BILATERAL LOW BACK PAIN, UNSPECIFIED WHETHER SCIATICA PRESENT: ICD-10-CM

## 2023-12-18 RX ORDER — FENTANYL 12.5 UG/1
1 PATCH TRANSDERMAL
Qty: 15 PATCH | Refills: 0 | Status: SHIPPED | OUTPATIENT
Start: 2023-12-18 | End: 2024-01-23

## 2023-12-18 NOTE — TELEPHONE ENCOUNTER
Medication refill information reviewed.     Due date for fentaNYL (DURAGESIC) 12 mcg/hr 72 hr patch    is 12/23/23     Prescriptions prepped for review.     Will route to provider.

## 2023-12-18 NOTE — TELEPHONE ENCOUNTER
Received call from patient requesting refill(s) of fentaNYL (DURAGESIC) 12 mcg/hr 72 hr patch      Last dispensed from pharmacy on 11/23/23    Patient's last office/virtual visit by prescribing provider on 11/17/23  Next office/virtual appointment scheduled for 02/19/24    Last urine drug screen date 02/16/23  Current opioid agreement on file (completed within the last year) Yes Date of opioid agreement: 02/16/23    E-prescribe to pharmacy-  [id number] [place] [facility] [address]        Will route to nursing [place] for review and preparation of prescription(s).

## 2023-12-18 NOTE — TELEPHONE ENCOUNTER
Script Eprescribed to pharmacy    Signed Prescriptions:                        Disp   Refills    fentaNYL (DURAGESIC) 12 mcg/hr 72 hr patch 15 pat*0        Sig: Place 1 patch onto the skin every 48 hours Ok to Fill           12/21/23 and Start on 12/23/23. 30 days supply           for chronic pain.  Authorizing Provider: LUCIAN HOPPER APRN, NP-C  [facility] Management [place]

## 2023-12-22 DIAGNOSIS — G89.29 CHRONIC BILATERAL THORACIC BACK PAIN: ICD-10-CM

## 2023-12-22 DIAGNOSIS — M54.50 CHRONIC BILATERAL LOW BACK PAIN, UNSPECIFIED WHETHER SCIATICA PRESENT: ICD-10-CM

## 2023-12-22 DIAGNOSIS — G89.29 CHRONIC INTRACTABLE PAIN: ICD-10-CM

## 2023-12-22 DIAGNOSIS — G89.29 CHRONIC BILATERAL LOW BACK PAIN, UNSPECIFIED WHETHER SCIATICA PRESENT: ICD-10-CM

## 2023-12-22 DIAGNOSIS — M19.012 LOCALIZED PRIMARY OSTEOARTHRITIS OF LEFT SHOULDER REGION: ICD-10-CM

## 2023-12-22 DIAGNOSIS — G89.29 CHRONIC RADICULAR CERVICAL PAIN: ICD-10-CM

## 2023-12-22 DIAGNOSIS — G62.9 PERIPHERAL POLYNEUROPATHY: ICD-10-CM

## 2023-12-22 DIAGNOSIS — M54.6 CHRONIC BILATERAL THORACIC BACK PAIN: ICD-10-CM

## 2023-12-22 DIAGNOSIS — M54.12 CHRONIC RADICULAR CERVICAL PAIN: ICD-10-CM

## 2023-12-22 RX ORDER — OXYCODONE AND ACETAMINOPHEN 7.5; 325 MG/1; MG/1
1 TABLET ORAL EVERY 6 HOURS
Qty: 120 TABLET | Refills: 0 | Status: SHIPPED | OUTPATIENT
Start: 2023-12-22 | End: 2024-01-25

## 2023-12-22 NOTE — TELEPHONE ENCOUNTER
Pending Prescriptions:                       Disp   Refills    oxyCODONE-acetaminophen (PERCOCET) 7.5-32*       0            Sig: Take 1 tablet by mouth every 6 hours Hold if           patient is sedated. Patient may refuse. Ok to           fill January 13, 2024 add start January  15, 2024    [id number] - [facility] [address] [facility] [facility]

## 2023-12-22 NOTE — TELEPHONE ENCOUNTER
Name from pharmacy: OXYCODONE/APAP 7.5MG-325MG TAB          Will file in chart as: oxyCODONE-acetaminophen (PERCOCET) 7.5-325 MG per tablet    Sig: TAKE 1 TABLET BY MOUTH EVERY 6 HOURS HOLD IF PATIENT IS SEDATED. PATIENTMAY REFUSE. OK TO FILL/START NOVEMBER 22, 2023     important  Maximum MME cannot be calculated for this prescription. Enter discrete sig details to calculate maximum MME.       Disp: 120 tablet    Refills: 0    Start: 12/22/2023    Earliest Fill Date: 12/22/2023    Class: E-Prescribe    Non-formulary For: Chronic intractable pain; Peripheral polyneuropathy; Chronic radicular cervical pain; Chronic bilateral low back pain, unspecified whether sciatica present; Localized primary osteoarthritis of left shoulder region; Chronic bilateral thoracic back pain    To pharmacy: PLS AUTH REFILLS THANKS    Last ordered: 1 month ago (11/22/2023) by ALPESH Garrido CNP    Last refill: 12/16/2023    Rx #: [id number]       To be filled at: [facility] Only #[id number] - [facility] [address]

## 2023-12-22 NOTE — TELEPHONE ENCOUNTER
Script Eprescribed to pharmacy    Signed Prescriptions:                        Disp   Refills    oxyCODONE-acetaminophen (PERCOCET) 7.5-325*120 ta*0        Sig: Take 1 tablet by mouth every 6 hours Hold if patient           is sedated. Patient may refuse. Ok to fill           12/26/23 to begin using on 12/27/23. #120 is a 30           days refill for chronic pain.  Authorizing Provider: LUCIAN HOPPER APRN, NP-C  [facility] Management [place]

## 2023-12-22 NOTE — TELEPHONE ENCOUNTER
Received call from patient requesting refill(s) of oxyCODONE-acetaminophen (PERCOCET) 7.5-325 MG per tablet      Last dispensed from pharmacy on 12/16/23    Patient's last office/virtual visit by prescribing provider on 11/17/23  Next office/virtual appointment scheduled for 02/19/24    Last urine drug screen date 02/16/23  Current opioid agreement on file (completed within the last year) Yes Date of opioid agreement: 02/16/23    E-prescribe to pharmacy- [facility] Only #[id number] - [place], [place] - [address]     Will route to nursing [place] for review and preparation of prescription(s).

## 2024-01-21 DIAGNOSIS — I20.89 STABLE ANGINA (H): ICD-10-CM

## 2024-01-23 DIAGNOSIS — G89.29 CHRONIC INTRACTABLE PAIN: ICD-10-CM

## 2024-01-23 DIAGNOSIS — M54.50 CHRONIC BILATERAL LOW BACK PAIN, UNSPECIFIED WHETHER SCIATICA PRESENT: ICD-10-CM

## 2024-01-23 DIAGNOSIS — M54.6 CHRONIC BILATERAL THORACIC BACK PAIN: ICD-10-CM

## 2024-01-23 DIAGNOSIS — G89.29 CHRONIC BILATERAL THORACIC BACK PAIN: ICD-10-CM

## 2024-01-23 DIAGNOSIS — M54.12 CHRONIC RADICULAR CERVICAL PAIN: ICD-10-CM

## 2024-01-23 DIAGNOSIS — G89.29 CHRONIC BILATERAL LOW BACK PAIN, UNSPECIFIED WHETHER SCIATICA PRESENT: ICD-10-CM

## 2024-01-23 DIAGNOSIS — G62.9 PERIPHERAL POLYNEUROPATHY: ICD-10-CM

## 2024-01-23 DIAGNOSIS — M15.3 POST-TRAUMATIC OSTEOARTHRITIS OF MULTIPLE JOINTS: ICD-10-CM

## 2024-01-23 DIAGNOSIS — G89.29 CHRONIC RADICULAR CERVICAL PAIN: ICD-10-CM

## 2024-01-23 RX ORDER — FENTANYL 12.5 UG/1
1 PATCH TRANSDERMAL
Qty: 15 PATCH | Refills: 0 | Status: SHIPPED | OUTPATIENT
Start: 2024-01-23 | End: 2024-02-05

## 2024-01-23 RX ORDER — NITROGLYCERIN 0.4 MG/1
TABLET SUBLINGUAL
Qty: 25 TABLET | Refills: 11 | Status: SHIPPED | OUTPATIENT
Start: 2024-01-23

## 2024-01-23 NOTE — TELEPHONE ENCOUNTER
Script Eprescribed to pharmacy    Signed Prescriptions:                        Disp   Refills    fentaNYL (DURAGESIC) 12 mcg/hr 72 hr patch 15 pat*0        Sig: Place 1 patch onto the skin every 48 hours Ok to           Fill/start 01/23/24. 30 days supply for chronic           pain.  Authorizing Provider: LUCIAN HOPPER APRN, NP-C  [facility] Pain Management [place]

## 2024-01-23 NOTE — TELEPHONE ENCOUNTER
Received call from patient requesting refill(s) of fentaNYL (DURAGESIC) 12 mcg/hr 72 hr patch      Last dispensed from pharmacy on 12/24/23    Patient's last office/virtual visit by prescribing provider on 11/17/23  Next office/virtual appointment scheduled for 02/19/24    Last urine drug screen date 02/16/23  Current opioid agreement on file (completed within the last year) Yes Date of opioid agreement: 02/16/23    E-prescribe to pharmacy-  [facility] Only [id number] [place] - [address]       Will route to nursing [place] for review and preparation of prescription(s).

## 2024-01-23 NOTE — TELEPHONE ENCOUNTER
Medication refill information reviewed.     Due date for fentaNYL (DURAGESIC) 12 mcg/hr 72 hr patch    is 01/23/24     Prescriptions prepped for review.     Will route to provider.

## 2024-01-25 DIAGNOSIS — M54.50 CHRONIC BILATERAL LOW BACK PAIN, UNSPECIFIED WHETHER SCIATICA PRESENT: ICD-10-CM

## 2024-01-25 DIAGNOSIS — G89.29 CHRONIC RADICULAR CERVICAL PAIN: ICD-10-CM

## 2024-01-25 DIAGNOSIS — M19.012 LOCALIZED PRIMARY OSTEOARTHRITIS OF LEFT SHOULDER REGION: ICD-10-CM

## 2024-01-25 DIAGNOSIS — G62.9 PERIPHERAL POLYNEUROPATHY: ICD-10-CM

## 2024-01-25 DIAGNOSIS — G89.29 CHRONIC BILATERAL THORACIC BACK PAIN: ICD-10-CM

## 2024-01-25 DIAGNOSIS — G89.29 CHRONIC INTRACTABLE PAIN: ICD-10-CM

## 2024-01-25 DIAGNOSIS — M54.6 CHRONIC BILATERAL THORACIC BACK PAIN: ICD-10-CM

## 2024-01-25 DIAGNOSIS — M54.12 CHRONIC RADICULAR CERVICAL PAIN: ICD-10-CM

## 2024-01-25 DIAGNOSIS — G89.29 CHRONIC BILATERAL LOW BACK PAIN, UNSPECIFIED WHETHER SCIATICA PRESENT: ICD-10-CM

## 2024-01-25 RX ORDER — OXYCODONE AND ACETAMINOPHEN 7.5; 325 MG/1; MG/1
1 TABLET ORAL EVERY 6 HOURS
Qty: 120 TABLET | Refills: 0 | Status: SHIPPED | OUTPATIENT
Start: 2024-01-25 | End: 2024-02-22

## 2024-01-25 NOTE — TELEPHONE ENCOUNTER
Medication refill information reviewed. [facility] pharmacy fills 10 day supplies. The 01/16/24 fill was the last 10 day supply so patient is due for a new refill.     Due date for oxyCODONE-acetaminophen (PERCOCET) 7.5-325 MG per tablet    is 01/26/24     Prescriptions prepped for review.     Will route to provider.

## 2024-01-25 NOTE — TELEPHONE ENCOUNTER
Received call from patient requesting refill(s) of oxyCODONE-acetaminophen (PERCOCET) 7.5-325 MG per tablet      Last dispensed from pharmacy on 01/16/24    Patient's last office/virtual visit by prescribing provider on 11/17/23  Next office/virtual appointment scheduled for 02/19/24    Last urine drug screen date 02/16/23  Current opioid agreement on file (completed within the last year) Yes Date of opioid agreement: 02/16/23    E-prescribe to pharmacy-[facility] Only #[id number] - [place], [place] - [address]      Will route to nursing [place] for review and preparation of prescription(s).

## 2024-01-26 NOTE — TELEPHONE ENCOUNTER
Script Eprescribed to pharmacy    Signed Prescriptions:                        Disp   Refills    oxyCODONE-acetaminophen (PERCOCET) 7.5-325*120 ta*0        Sig: Take 1 tablet by mouth every 6 hours Hold if patient           is sedated. Patient may refuse. Ok to fill           01/25/24 to begin using on 01/26/24. #120 is a 30           days refill for chronic pain.  Authorizing Provider: LUCIAN HOPPER APRN, NP-C  [facility] Management [place]

## 2024-02-01 DIAGNOSIS — M15.3 POST-TRAUMATIC OSTEOARTHRITIS OF MULTIPLE JOINTS: ICD-10-CM

## 2024-02-01 DIAGNOSIS — G89.29 CHRONIC BILATERAL THORACIC BACK PAIN: ICD-10-CM

## 2024-02-01 DIAGNOSIS — M54.6 CHRONIC BILATERAL THORACIC BACK PAIN: ICD-10-CM

## 2024-02-01 DIAGNOSIS — M54.50 CHRONIC BILATERAL LOW BACK PAIN, UNSPECIFIED WHETHER SCIATICA PRESENT: ICD-10-CM

## 2024-02-01 DIAGNOSIS — G89.29 CHRONIC INTRACTABLE PAIN: ICD-10-CM

## 2024-02-01 DIAGNOSIS — G62.9 PERIPHERAL POLYNEUROPATHY: ICD-10-CM

## 2024-02-01 DIAGNOSIS — G89.29 CHRONIC RADICULAR CERVICAL PAIN: ICD-10-CM

## 2024-02-01 DIAGNOSIS — G89.29 CHRONIC BILATERAL LOW BACK PAIN, UNSPECIFIED WHETHER SCIATICA PRESENT: ICD-10-CM

## 2024-02-01 DIAGNOSIS — M54.12 CHRONIC RADICULAR CERVICAL PAIN: ICD-10-CM

## 2024-02-01 RX ORDER — FENTANYL 12.5 UG/1
1 PATCH TRANSDERMAL
Qty: 15 PATCH | Refills: 0 | Status: CANCELLED | OUTPATIENT
Start: 2024-02-01

## 2024-02-01 NOTE — TELEPHONE ENCOUNTER
Medication refill information reviewed. This was just sent in on 01/23/24. They are a LTC pharmacy and only dispense 10 days at a time but they should retain the remainder of the refill at this pharmacy unlike a retail pharmacy. I did try to call them but there was no answer. Will try again.     Emily Sin, MARCON, RN  Care Coordinator  [facility] Pain Management [place]

## 2024-02-01 NOTE — TELEPHONE ENCOUNTER
Received call from patient requesting refill(s) of     FENTANYL 12 MCG/HR TD PT72  Last dispensed from pharmacy on: Jan. 23, 2024           Patient's last office/virtual visit by prescribing provider on: Aug. 17, 2023   Next office/virtual appointment scheduled for: Feb. 16, 2024     UDT: Feb. 16, 2023   CSA: Feb. 16, 2023     E-prescribe to:   [facility] ONLY #[id number] [facility] [address] [place] [address],    Will route to nursing [place] for review and preparation of prescription(s).       Salima Sin, Clinic Facilitator  [facility] Pain Management [place]

## 2024-02-05 RX ORDER — FENTANYL 12.5 UG/1
1 PATCH TRANSDERMAL
Qty: 15 PATCH | Refills: 0 | Status: SHIPPED | OUTPATIENT
Start: 2024-02-05 | End: 2024-03-08

## 2024-02-05 NOTE — TELEPHONE ENCOUNTER
Script Eprescribed to pharmacy    Signed Prescriptions:                        Disp   Refills    fentaNYL (DURAGESIC) 12 mcg/hr 72 hr patch 15 pat*0        Sig: Place 1 patch onto the skin every 48 hours Ok to Fill           02/20/24; start 02/22/24. 30 days supply for           chronic pain.  Authorizing Provider: LUCIAN HOPPER APRN, NP-C  [facility] Pain Management [place]

## 2024-02-05 NOTE — TELEPHONE ENCOUNTER
"Call placed to [facility] pharmacy and was told they dispensed a 30 day supply on 1/23. They had requested the February refill early \"to stay on top of it.\"    Pending Prescriptions:                       Disp   Refills    fentaNYL (DURAGESIC) 12 mcg/hr 72 hr patch15 pat*0            Sig: Place 1 patch onto the skin every 48 hours Ok to           Fill 02/20/24; start 02/22/24. 30 days supply for           chronic pain.   [facility]  "

## 2024-02-12 DIAGNOSIS — M54.6 CHRONIC BILATERAL THORACIC BACK PAIN: ICD-10-CM

## 2024-02-12 DIAGNOSIS — G89.29 CHRONIC BILATERAL THORACIC BACK PAIN: ICD-10-CM

## 2024-02-13 RX ORDER — TRAZODONE HYDROCHLORIDE 100 MG/1
TABLET ORAL
Qty: 90 TABLET | Refills: 1 | Status: SHIPPED | OUTPATIENT
Start: 2024-02-13 | End: 2024-08-15

## 2024-02-13 RX ORDER — GABAPENTIN 300 MG/1
CAPSULE ORAL
Qty: 90 CAPSULE | Refills: 5 | Status: SHIPPED | OUTPATIENT
Start: 2024-02-13 | End: 2024-08-15

## 2024-02-22 DIAGNOSIS — M19.012 LOCALIZED PRIMARY OSTEOARTHRITIS OF LEFT SHOULDER REGION: ICD-10-CM

## 2024-02-22 DIAGNOSIS — G89.29 CHRONIC BILATERAL THORACIC BACK PAIN: ICD-10-CM

## 2024-02-22 DIAGNOSIS — M54.50 CHRONIC BILATERAL LOW BACK PAIN, UNSPECIFIED WHETHER SCIATICA PRESENT: ICD-10-CM

## 2024-02-22 DIAGNOSIS — G62.9 PERIPHERAL POLYNEUROPATHY: ICD-10-CM

## 2024-02-22 DIAGNOSIS — G89.29 CHRONIC BILATERAL LOW BACK PAIN, UNSPECIFIED WHETHER SCIATICA PRESENT: ICD-10-CM

## 2024-02-22 DIAGNOSIS — G89.29 CHRONIC RADICULAR CERVICAL PAIN: ICD-10-CM

## 2024-02-22 DIAGNOSIS — M54.12 CHRONIC RADICULAR CERVICAL PAIN: ICD-10-CM

## 2024-02-22 DIAGNOSIS — G89.29 CHRONIC INTRACTABLE PAIN: ICD-10-CM

## 2024-02-22 DIAGNOSIS — M54.6 CHRONIC BILATERAL THORACIC BACK PAIN: ICD-10-CM

## 2024-02-22 NOTE — TELEPHONE ENCOUNTER
Received call from patient requesting refill(s) of oxyCODONE-acetaminophen (PERCOCET) 7.5-325 MG per tablet    Last dispensed from pharmacy on 1/26/2024 per pharmacy.     Patient's last office/virtual visit by prescribing provider on 11/17/2023.  Next office/virtual appointment scheduled for 3/8/2024.    Last urine drug screen date 2/16/2023.  Current opioid agreement on file (completed within the last year) No Date of opioid agreement: 2/16/2023.    E-prescribe to:    [facility] ONLY #[id number] - [place], [place] - [address]    Will route to nursing [place] for review and preparation of prescription(s).

## 2024-02-22 NOTE — TELEPHONE ENCOUNTER
Medication refill information reviewed. Will update annuals at 03/08/24 appt.    Due date for oxyCODONE-acetaminophen (PERCOCET) 7.5-325 MG per tablet  is 02/25/24     Prescriptions prepped for review.     Will route to provider.

## 2024-02-23 RX ORDER — OXYCODONE AND ACETAMINOPHEN 7.5; 325 MG/1; MG/1
1 TABLET ORAL EVERY 6 HOURS
Qty: 120 TABLET | Refills: 0 | Status: SHIPPED | OUTPATIENT
Start: 2024-02-23 | End: 2024-04-03

## 2024-03-01 ASSESSMENT — PAIN SCALES - PAIN ENJOYMENT GENERAL ACTIVITY SCALE (PEG)
AVG_PAIN_PASTWEEK: 8
PEG_TOTALSCORE: 8.33
INTERFERED_ENJOYMENT_LIFE: 8
INTERFERED_GENERAL_ACTIVITY: 9

## 2024-03-07 NOTE — PROGRESS NOTES
[facility]    CHIEF COMPLAINT: Chronic Pain.    INTERVAL HISTORY:  Last seen on 11/17/23.       Recommendations/plan at the last visit included:    30 minute Clinic follow-up with ALPESH Tucker NP-C in 3 months .  Ok to schedule up to three follow up appts at a time, alternating virtual and clinic appointments.      Medication Management :   Fentanyl refilled   Oxycodone /APAP 7.5 mg  QID PRN    Since last visit:   - Osvaldo is concerned that he doesn't always get his breakthrough oxycodone when he asks for it. His son is with today and states that Osvaldo mentions manda in pain frequently       Pain Information today: Severe Pain (7) /10. Location of pain: low back, joints .    Annual Requirements last collected: February 16, 2023     Current Pain Relevant Medications:    Acetaminophen 500 mg 1-2 tabs q 8 h PRN  Buspar 15 mg TID   Diclofenac gel 1% BID PRN   Gabapentin 100 mg: Starting titration up to 300 mg TID.   Trazodone 100 mg at HS      Current Controlled Substance Medications:   Fentanyl 12 mcg change patch q48h = 30 MME/day  Oxycodone/APAP IR 7.5/325 mg QID PRN  Max 45 MME/day              Total opiate dose = 75 MME/day      Previous Pain Relevant Medications: (H--helped; HI--Helped initially; SWH--Somewhat helpful; NH--No help; W--worse; SE--side effects; ?--Unsure if helpful)   Opiates: Oxycodone:H, Fentanyl:?,   NSAIDS:   Migraine medications:   Muscle Relaxants:   Neuropathics:   Anti-depressants for pain:         Anxiety medications: Buspar:H,           Topicals: diclofenac:SWH  OTC medications:   Sleep Medications: Trazodone:H  Other medications not covered above: Vitamin D: H     SUBSTANCE HISTORY:   Past or current illegal drug use: Denies   Past or current ETOH use: Past   Nicotine/tobacco use: none   Daily Caffeine intake: 2-3 day     CURRENT FAMILY/SOCIAL SITUATION:  Past/Present occupation: Retired, was in the  then    Housing status: Assisted family    Emotional/Physical support: wife, sons  Safety Concerns: falls risk  Current stressors: denies      THE 4 As OF OPIOID MAINTENANCE ANALGESIA    Analgesia: Is pain relief clinically significant? YES   Activity: Is patient functional and able to perform Activities of Daily Living? YES   Adverse effects: Is patient free from adverse side effects from opiates? YES   Adherence to Rx protocol: Is patient adhering to Controlled Substance Agreement and taking medications ONLY as ordered? YES     Is Narcan prescribed for opiate use >50 MME daily or concurrent use of opiates and benzodiazepines? NO    [place] Board of Pharmacy Data Base Reviewed:    YES; No concern for abuse or misuse of controlled medications based on this report. Reviewed [facility] March 7, 2024- no concerning fills.      PHYSICAL EXAM    Vitals:    03/08/24 0752   BP: (!) 161/72   Pulse: (!) 48   SpO2: 93%       Constitutional: healthy, alert, and no distress A&O.   Patient is appropriate.  Psychiatric/mental status: Alert, without lethargy or stupor. Appropriate affect.     Neurologic exam:  CN:  Cranial nerves 2-12 are grossly normal.    MUSCULOSKELETAL:     Posture: Upright, shoulders and pelvis are leveled. Yes  Antalgic Gait Pattern?: Yes Seated in wheelchair         DIRE Score for ongoing opioid management is calculated as follows:    Diagnosis = 3 pts (advanced condition; severe pain/objective findings)    Intractability = 3 pts (patient fully engaged but inadequate response to treatments)    Risk        Psych = 1 pt (serious personality dysfunction/mental illness that significantly interferes with care)         Chem Hlth = 3 pts (no history of chemical dependency; not drug-focused)       Reliability = 3 pts (highly reliable with meds, appointments, treatments)       Social = 2 pts (reduction in some relationships/life rolls)       (Psych + Chem hlth + Reliability + Social) = 15    Efficacy = 1 pt (poor function; minimal pain relief despite  mod/high med dose)    DIRE Score = 16        7-13: likely NOT suitable candidate for long-term opioid analgesia       14-21: may be a suitable candidate for long-term opioid analgesia     DIAGNOSTIC RESULTS:     No recent imaging has been done.      PAIN RELAVENT CONDITIONS:   1.  Post traumatic osteoarthritis multiple joints   2.  Chronic low back pain, mid back pain   3. Bilateral SI joint pain R>L   4.  Uncomplicated long term use of opiates.    DIAGNOSIS AND PLAN:     (G89.29) Chronic intractable pain  (primary encounter diagnosis)  (G62.9) Peripheral polyneuropathy  (M54.12,  G89.29) Chronic radicular cervical pain  (M54.50,  G89.29) Chronic bilateral low back pain, unspecified whether sciatica present  (M54.6,  G89.29) Chronic bilateral thoracic back pain  (M15.3) Post-traumatic osteoarthritis of multiple joints  Comment: Adjusted Percocet so that he gets it scheduled and also has 1 tablet during the night if he requests it.   Plan:   fentaNYL (DURAGESIC) 12 mcg/hr 72 hr patch,   oxyCODONE-acetaminophen (PERCOCET) 7.5-325 MG schedule   oxyCODONE-acetaminophen (PERCOCET) 7.5-325 MG per tablet PRN during the night     (Z79.891) Long term (current) use of opiate analgesic  Comment: Annuals   Plan: Drug Confirmation Panel Urine with Creatinine                Hypertension: hypertension    PATIENT INSTRUCTIONS:     Diagnosis reviewed, treatment option addressed, and risk/benefits discussed.  Self-care instructions given.  I am recommending a multidisciplinary treatment plan to help this patient better manage pain.    Remember to request ALL medication refills 5 BUSINESS days before you run out.     30 minutes Video or Clinic follow-up with ALPESH Tucker, NP-C in 2 months.  Labs: Annual Urine Drug Screen   Medication Management : Medication changes effective March 8, 2024   Percocet 7.5/325 mg: SCHEDULED at 8 am, 1 pm, 5 pm and 9 pm   Percocet 7.5/325 mg: PRN between 12 am and 4 am if patient requests during  the night  Fentanyl 12.5 mg: No changes     I have reviewed the note as documented above.  This accurately captures the substance of my conversation with the patient.  A total of 30 minutes of preparation, care, and consultation were spent on this visit today.     ALPESH Alamo, NP-C  [facility]    (Information in italics and blue color are taken from previous pain and consulting medical providers notes and are documented as such)

## 2024-03-08 ENCOUNTER — OFFICE VISIT (OUTPATIENT)
Dept: PALLIATIVE MEDICINE | Facility: OTHER | Age: 84
End: 2024-03-08
Attending: NURSE PRACTITIONER
Payer: MEDICARE

## 2024-03-08 VITALS — OXYGEN SATURATION: 93 % | SYSTOLIC BLOOD PRESSURE: 161 MMHG | DIASTOLIC BLOOD PRESSURE: 72 MMHG | HEART RATE: 48 BPM

## 2024-03-08 DIAGNOSIS — G62.9 PERIPHERAL POLYNEUROPATHY: ICD-10-CM

## 2024-03-08 DIAGNOSIS — M54.12 CHRONIC RADICULAR CERVICAL PAIN: ICD-10-CM

## 2024-03-08 DIAGNOSIS — G89.29 CHRONIC BILATERAL LOW BACK PAIN, UNSPECIFIED WHETHER SCIATICA PRESENT: ICD-10-CM

## 2024-03-08 DIAGNOSIS — M54.50 CHRONIC BILATERAL LOW BACK PAIN, UNSPECIFIED WHETHER SCIATICA PRESENT: ICD-10-CM

## 2024-03-08 DIAGNOSIS — G89.29 CHRONIC BILATERAL THORACIC BACK PAIN: ICD-10-CM

## 2024-03-08 DIAGNOSIS — G89.29 CHRONIC RADICULAR CERVICAL PAIN: ICD-10-CM

## 2024-03-08 DIAGNOSIS — Z79.891 LONG TERM (CURRENT) USE OF OPIATE ANALGESIC: ICD-10-CM

## 2024-03-08 DIAGNOSIS — M15.3 POST-TRAUMATIC OSTEOARTHRITIS OF MULTIPLE JOINTS: ICD-10-CM

## 2024-03-08 DIAGNOSIS — G89.29 CHRONIC INTRACTABLE PAIN: Primary | ICD-10-CM

## 2024-03-08 DIAGNOSIS — M54.6 CHRONIC BILATERAL THORACIC BACK PAIN: ICD-10-CM

## 2024-03-08 LAB
CANNABINOIDS UR QL SCN: NORMAL
CREAT UR-MCNC: 55 MG/DL

## 2024-03-08 PROCEDURE — 80307 DRUG TEST PRSMV CHEM ANLYZR: CPT | Performed by: NURSE PRACTITIONER

## 2024-03-08 PROCEDURE — 99214 OFFICE O/P EST MOD 30 MIN: CPT | Performed by: NURSE PRACTITIONER

## 2024-03-08 PROCEDURE — 80373 DRUG SCREENING TRAMADOL: CPT | Performed by: NURSE PRACTITIONER

## 2024-03-08 PROCEDURE — G0463 HOSPITAL OUTPT CLINIC VISIT: HCPCS | Performed by: NURSE PRACTITIONER

## 2024-03-08 PROCEDURE — G2211 COMPLEX E/M VISIT ADD ON: HCPCS | Performed by: NURSE PRACTITIONER

## 2024-03-08 PROCEDURE — 80353 DRUG SCREENING COCAINE: CPT | Performed by: NURSE PRACTITIONER

## 2024-03-08 RX ORDER — FENTANYL 12.5 UG/1
1 PATCH TRANSDERMAL
Qty: 15 PATCH | Refills: 0 | Status: SHIPPED | OUTPATIENT
Start: 2024-03-08 | End: 2024-04-03

## 2024-03-08 RX ORDER — OXYCODONE AND ACETAMINOPHEN 7.5; 325 MG/1; MG/1
1 TABLET ORAL DAILY PRN
Qty: 30 TABLET | Refills: 0 | Status: SHIPPED | OUTPATIENT
Start: 2024-03-08 | End: 2024-04-03

## 2024-03-08 RX ORDER — OXYCODONE AND ACETAMINOPHEN 7.5; 325 MG/1; MG/1
1 TABLET ORAL 4 TIMES DAILY
Qty: 120 TABLET | Refills: 0 | Status: SHIPPED | OUTPATIENT
Start: 2024-03-08 | End: 2024-04-03

## 2024-03-08 ASSESSMENT — PAIN SCALES - GENERAL: PAINLEVEL: SEVERE PAIN (7)

## 2024-03-08 NOTE — PATIENT INSTRUCTIONS
After Visit Instructions:     Thank you for coming to [facility] for your care. It is my goal to partner with you to help you reach your optimal state of health.   Continue daily self-care, identifying contributing factors, and monitoring variations in pain level. Continue to integrate self-care into your life.      30 minutes Video or Clinic follow-up with ALPESH Tucker NP-C in 2 months.  Labs: Annual Urine Drug Screen   Medication Management : Medication changes effective March 8, 2024   Percocet 7.5/325 mg: SCHEDULED at 8 am, 1 pm, 5 pm and 9 pm   Percocet 7.5/325 mg: PRN between 12 am and 4 am if patient requests during the night  Fentanyl 12.5 mg: No changes       ALPESH Alamo NP-C  [facility]  [facility] - Monday, Thursday and Friday  [facility] - Tuesday      Be sure to request ALL medication refills 5 days prior to the due date whether or not you will see your medical provider in an appointment before the due date.      Do not expect same day refills. If you do not plan ahead you may run out of medications.     Early refills are not provided.  It is your responsibility to manage your medications responsibility and keep them safely stored. Lost or destroyed medications WILL NOT be replaced    Scheduling/Clinic telephone Number for ALL locations:  [phone]    After Hours On-Call Service:  [phone]    Call with any questions about your care and for scheduling assistance.   Calls are returned Monday through Friday between 8 AM and 4:00 PM. We usually get back to you within 2 business days depending on the issue/request.    If we are prescribing your medications:  For opioid medication refills, call the clinic or send a [employer] message 7 days in advance.  Please include:  Your name and date of birth.   Name of requested medication  Name of the pharmacy.  For non-opioid medications, call your pharmacy directly to request a refill.  Please allow 3-4 days to be processed.   Per [place] State Law:  All controlled substance prescriptions must be filled within 30 days of being written.    For those controlled substances allowing refills, pickup must occur within 30 days of last fill.      We believe regular attendance is key to your success in our program!    Any time you are unable to keep your appointment we ask that you call us at least 24 hours in advance to cancel.This will allow us to offer the appointment time to another patient.   Multiple missed appointments may lead to dismissal from the clinic.

## 2024-03-08 NOTE — PROGRESS NOTES
Patient presents to the clinic today for a visit with ALPESH Garrido CNP regarding Pain Management.          4/6/2023     1:05 PM 8/17/2023     8:58 AM 3/8/2024     7:56 AM   PEG Score   PEG Total Score 2 5 5.33       UDS/CSA- 02.16.2023    Medications- Percocet    Fentanyl day 1    Notes May not be being allowed the fourth percocet when requested.     Jazmine Best  [facility] Clinical Assistant

## 2024-03-12 LAB
FENTANYL UR CFM-MCNC: 13 NG/ML
FENTANYL/CREAT UR: 24 NG/MG {CREAT}
GABAPENTIN UR QL CFM: PRESENT
NORFENTANYL UR CFM-MCNC: 26 NG/ML
NORFENTANYL/CREAT UR: 47 NG/MG {CREAT}
OXYCODONE UR CFM-MCNC: 2220 NG/ML
OXYCODONE/CREAT UR: 4036 NG/MG {CREAT}
OXYMORPHONE UR CFM-MCNC: 820 NG/ML
OXYMORPHONE/CREAT UR: 1491 NG/MG {CREAT}

## 2024-03-26 DIAGNOSIS — E03.4 HYPOTHYROIDISM DUE TO ACQUIRED ATROPHY OF THYROID: ICD-10-CM

## 2024-03-26 DIAGNOSIS — I25.718 CORONARY ARTERY DISEASE OF AUTOLOGOUS VEIN BYPASS GRAFT WITH STABLE ANGINA PECTORIS (H): ICD-10-CM

## 2024-03-27 RX ORDER — OLMESARTAN MEDOXOMIL 20 MG/1
TABLET ORAL
Qty: 90 TABLET | Refills: 0 | Status: SHIPPED | OUTPATIENT
Start: 2024-03-27 | End: 2024-06-18

## 2024-03-27 RX ORDER — LEVOTHYROXINE SODIUM 50 UG/1
50 TABLET ORAL DAILY
Qty: 30 TABLET | Refills: 0 | Status: SHIPPED | OUTPATIENT
Start: 2024-03-27 | End: 2024-04-23

## 2024-04-02 DIAGNOSIS — M15.3 POST-TRAUMATIC OSTEOARTHRITIS OF MULTIPLE JOINTS: ICD-10-CM

## 2024-04-02 DIAGNOSIS — M54.6 CHRONIC BILATERAL THORACIC BACK PAIN: ICD-10-CM

## 2024-04-02 DIAGNOSIS — G89.29 CHRONIC BILATERAL LOW BACK PAIN, UNSPECIFIED WHETHER SCIATICA PRESENT: ICD-10-CM

## 2024-04-02 DIAGNOSIS — G89.29 CHRONIC BILATERAL THORACIC BACK PAIN: ICD-10-CM

## 2024-04-02 DIAGNOSIS — G89.29 CHRONIC INTRACTABLE PAIN: ICD-10-CM

## 2024-04-02 DIAGNOSIS — G62.9 PERIPHERAL POLYNEUROPATHY: ICD-10-CM

## 2024-04-02 DIAGNOSIS — G89.29 CHRONIC RADICULAR CERVICAL PAIN: ICD-10-CM

## 2024-04-02 DIAGNOSIS — M54.50 CHRONIC BILATERAL LOW BACK PAIN, UNSPECIFIED WHETHER SCIATICA PRESENT: ICD-10-CM

## 2024-04-02 DIAGNOSIS — M54.12 CHRONIC RADICULAR CERVICAL PAIN: ICD-10-CM

## 2024-04-02 NOTE — TELEPHONE ENCOUNTER
Received call from patient requesting refill(s) of oxyCODONE-acetaminophen (PERCOCET) 7.5-325 MG per tablet      Last dispensed from pharmacy on 03/20/24    Patient's last office/virtual visit by prescribing provider on 03/08/24  Next office/virtual appointment scheduled for: none    Last urine drug screen date 03/08/24  Current opioid agreement on file (completed within the last year) Yes Date of opioid agreement: 03/08/24    E-prescribe to pharmacy-[id number] [place] [place] - [address]      Will route to nursing [place] for review and preparation of prescription(s).

## 2024-04-03 RX ORDER — OXYCODONE AND ACETAMINOPHEN 7.5; 325 MG/1; MG/1
1 TABLET ORAL 4 TIMES DAILY
Qty: 150 TABLET | Refills: 0 | Status: SHIPPED | OUTPATIENT
Start: 2024-04-03 | End: 2024-05-08

## 2024-04-03 RX ORDER — FENTANYL 12.5 UG/1
1 PATCH TRANSDERMAL
Qty: 15 PATCH | Refills: 0 | Status: SHIPPED | OUTPATIENT
Start: 2024-04-03 | End: 2024-05-17

## 2024-04-03 NOTE — TELEPHONE ENCOUNTER
Medication refill information reviewed. Spoke with pharmacist and they are requesting that the Percocet be combined into 1 prescription with both scheduled and prn instructions. Canceled prescription on profile and pended per pharmacists request. Please review. Also including the Fentanyl patch here as it is due on 04/21/23.     Due date for oxyCODONE-acetaminophen (PERCOCET) 7.5-325 MG per tablet is 04/04/24   Fentanyl 12 mcg patch is 04/23/24    Prescriptions prepped for review.     Will route to provider.

## 2024-04-03 NOTE — TELEPHONE ENCOUNTER
Script Eprescribed to pharmacy    Signed Prescriptions:                        Disp   Refills    oxyCODONE-acetaminophen (PERCOCET) 7.5-325*150 ta*0        Sig: Take 1 tablet by mouth 4 times daily Scheduled. 1 tab           at 8 am, 1 pm, 5 pm, and 9 pm. May also take 1           tablet by mouth daily as needed for severe pain           Between 12 AM and 4 AM. Ok to fill/start 04/04/24  Authorizing Provider: LUCIAN HOPPER    fentaNYL (DURAGESIC) 12 mcg/hr 72 hr patch 15 pat*0        Sig: Place 1 patch onto the skin every 48 hours Ok to fill           04/21/24 to begin using 04/23/24 30 days supply           for chronic pain.  Authorizing Provider: LUCIAN HOPPER      Thank you for using My Chart to request your medication refills!  ALPESH Alamo, NP-C  [facility]      +

## 2024-05-08 DIAGNOSIS — G89.29 CHRONIC RADICULAR CERVICAL PAIN: ICD-10-CM

## 2024-05-08 DIAGNOSIS — G89.29 CHRONIC BILATERAL THORACIC BACK PAIN: ICD-10-CM

## 2024-05-08 DIAGNOSIS — M54.50 CHRONIC BILATERAL LOW BACK PAIN, UNSPECIFIED WHETHER SCIATICA PRESENT: ICD-10-CM

## 2024-05-08 DIAGNOSIS — M15.3 POST-TRAUMATIC OSTEOARTHRITIS OF MULTIPLE JOINTS: ICD-10-CM

## 2024-05-08 DIAGNOSIS — G62.9 PERIPHERAL POLYNEUROPATHY: ICD-10-CM

## 2024-05-08 DIAGNOSIS — G89.29 CHRONIC BILATERAL LOW BACK PAIN, UNSPECIFIED WHETHER SCIATICA PRESENT: ICD-10-CM

## 2024-05-08 DIAGNOSIS — M54.6 CHRONIC BILATERAL THORACIC BACK PAIN: ICD-10-CM

## 2024-05-08 DIAGNOSIS — G89.29 CHRONIC INTRACTABLE PAIN: ICD-10-CM

## 2024-05-08 DIAGNOSIS — M54.12 CHRONIC RADICULAR CERVICAL PAIN: ICD-10-CM

## 2024-05-08 NOTE — TELEPHONE ENCOUNTER
Medication refill information reviewed.     Due date for oxyCODONE-acetaminophen (PERCOCET) 7.5-325 MG per tablet is 05/08/24     Prescriptions prepped for review.     Will route to provider.

## 2024-05-08 NOTE — TELEPHONE ENCOUNTER
Received call from patient requesting refill(s) of oxyCODONE-acetaminophen (PERCOCET) 7.5-325 MG per tablet      Last dispensed from pharmacy on 05/06/24    Patient's last office/virtual visit by prescribing provider on 03/08/24  Next office/virtual appointment scheduled for : none    Last urine drug screen date 03/08/24  Current opioid agreement on file (completed within the last year) Yes Date of opioid agreement: 03/08/24    E-prescribe to pharmacy-[id number] [place], [place] - [address]     Will route to nursing [place] for review and preparation of prescription(s).

## 2024-05-09 RX ORDER — OXYCODONE AND ACETAMINOPHEN 7.5; 325 MG/1; MG/1
1 TABLET ORAL 4 TIMES DAILY
Qty: 150 TABLET | Refills: 0 | Status: SHIPPED | OUTPATIENT
Start: 2024-05-09 | End: 2024-06-16

## 2024-05-09 NOTE — TELEPHONE ENCOUNTER
Script Eprescribed to pharmacy    Signed Prescriptions:                        Disp   Refills    oxyCODONE-acetaminophen (PERCOCET) 7.5-325*150 ta*0        Sig: Take 1 tablet by mouth 4 times daily Scheduled. 1 tab           at 8 am, 1 pm, 5 pm, and 9 pm. May also take 1           tablet by mouth daily as needed for severe pain           Between 12 AM and 4 AM. Ok to fill/start 05/08/24  Authorizing Provider: LUCIAN HOPPER      Thank you for using My Chart to request your medication refills!     ALPESH Alamo, NP-C  [facility] [place]

## 2024-05-17 DIAGNOSIS — G89.29 CHRONIC RADICULAR CERVICAL PAIN: ICD-10-CM

## 2024-05-17 DIAGNOSIS — M15.3 POST-TRAUMATIC OSTEOARTHRITIS OF MULTIPLE JOINTS: ICD-10-CM

## 2024-05-17 DIAGNOSIS — G89.29 CHRONIC BILATERAL THORACIC BACK PAIN: ICD-10-CM

## 2024-05-17 DIAGNOSIS — M54.50 CHRONIC BILATERAL LOW BACK PAIN, UNSPECIFIED WHETHER SCIATICA PRESENT: ICD-10-CM

## 2024-05-17 DIAGNOSIS — M54.6 CHRONIC BILATERAL THORACIC BACK PAIN: ICD-10-CM

## 2024-05-17 DIAGNOSIS — G89.29 CHRONIC BILATERAL LOW BACK PAIN, UNSPECIFIED WHETHER SCIATICA PRESENT: ICD-10-CM

## 2024-05-17 DIAGNOSIS — G62.9 PERIPHERAL POLYNEUROPATHY: ICD-10-CM

## 2024-05-17 DIAGNOSIS — G89.29 CHRONIC INTRACTABLE PAIN: ICD-10-CM

## 2024-05-17 DIAGNOSIS — M54.12 CHRONIC RADICULAR CERVICAL PAIN: ICD-10-CM

## 2024-05-17 RX ORDER — FENTANYL 12.5 UG/1
1 PATCH TRANSDERMAL
Qty: 15 PATCH | Refills: 0 | Status: SHIPPED | OUTPATIENT
Start: 2024-05-17 | End: 2024-06-11

## 2024-05-17 NOTE — TELEPHONE ENCOUNTER
Medication refill information reviewed.     Due date for fentaNYL (DURAGESIC) 12 mcg/hr 72 hr patch  is 05/23/24     Prescriptions prepped for review.     Will route to provider.

## 2024-05-17 NOTE — TELEPHONE ENCOUNTER
Received call from patient requesting refill(s) of   fentaNYL (DURAGESIC) 12 mcg/hr 72 hr patch    Last dispensed from pharmacy on 4/21/2024 per pharmacy.     Patient's last office/virtual visit by prescribing provider on 3/8/2024.  Next office/virtual appointment scheduled for 6/13/2024.    Last urine drug screen date 3/8/2024.    Current opioid agreement on file (completed within the last year) Yes Date of opioid agreement: 3/8/2024.    E-prescribe to:    [id number] - [place], [place] - [address]    Will route to nursing pool for review and preparation of prescription(s).

## 2024-05-17 NOTE — TELEPHONE ENCOUNTER
Script Eprescribed to pharmacy    Signed Prescriptions:                        Disp   Refills    fentaNYL (DURAGESIC) 12 mcg/hr 72 hr patch 15 pat*0        Sig: Place 1 patch onto the skin every 48 hours Ok to fill           05/21/24 to begin using 05/23/24 30 days supply           for chronic pain.  Authorizing Provider: LUCIAN HOPPER      Thank you for using My Chart to request your medication refills!     ALPESH Alamo, NP-C  [facility] [place]

## 2024-05-28 DIAGNOSIS — R41.89 SUBJECTIVE MEMORY COMPLAINTS: ICD-10-CM

## 2024-05-28 DIAGNOSIS — Z95.5 PRESENCE OF CORONARY ARTERY BYPASS GRAFT STENT: ICD-10-CM

## 2024-05-28 DIAGNOSIS — G30.9 MAJOR NEUROCOGNITIVE DISORDER DUE TO ALZHEIMER'S DISEASE (H): ICD-10-CM

## 2024-05-28 DIAGNOSIS — E03.4 HYPOTHYROIDISM DUE TO ACQUIRED ATROPHY OF THYROID: ICD-10-CM

## 2024-05-28 DIAGNOSIS — F02.80 MAJOR NEUROCOGNITIVE DISORDER DUE TO ALZHEIMER'S DISEASE (H): ICD-10-CM

## 2024-05-28 DIAGNOSIS — Z95.1 PRESENCE OF CORONARY ARTERY BYPASS GRAFT STENT: ICD-10-CM

## 2024-05-29 RX ORDER — DONEPEZIL HYDROCHLORIDE 10 MG/1
10 TABLET, FILM COATED ORAL AT BEDTIME
Qty: 90 TABLET | Refills: 1 | Status: SHIPPED | OUTPATIENT
Start: 2024-05-29

## 2024-05-29 NOTE — TELEPHONE ENCOUNTER
Refill request for: Donepezil    Directions: Take 1 tablet at bedtime     LOV: 12/04/23  NOV: None - Due 12/2024    90 day supply with 1 refills Medication T'd for review and signature

## 2024-06-03 RX ORDER — LEVOTHYROXINE SODIUM 50 UG/1
50 TABLET ORAL DAILY
Qty: 30 TABLET | Refills: 0 | Status: SHIPPED | OUTPATIENT
Start: 2024-06-03 | End: 2024-08-15

## 2024-06-03 RX ORDER — CLOPIDOGREL BISULFATE 75 MG/1
75 TABLET ORAL DAILY
Qty: 90 TABLET | Refills: 3 | Status: SHIPPED | OUTPATIENT
Start: 2024-06-03

## 2024-06-10 DIAGNOSIS — G89.29 CHRONIC INTRACTABLE PAIN: ICD-10-CM

## 2024-06-10 DIAGNOSIS — G89.29 CHRONIC BILATERAL THORACIC BACK PAIN: ICD-10-CM

## 2024-06-10 DIAGNOSIS — M54.50 CHRONIC BILATERAL LOW BACK PAIN, UNSPECIFIED WHETHER SCIATICA PRESENT: ICD-10-CM

## 2024-06-10 DIAGNOSIS — G89.29 CHRONIC BILATERAL LOW BACK PAIN, UNSPECIFIED WHETHER SCIATICA PRESENT: ICD-10-CM

## 2024-06-10 DIAGNOSIS — G62.9 PERIPHERAL POLYNEUROPATHY: ICD-10-CM

## 2024-06-10 DIAGNOSIS — M54.6 CHRONIC BILATERAL THORACIC BACK PAIN: ICD-10-CM

## 2024-06-10 DIAGNOSIS — G89.29 CHRONIC RADICULAR CERVICAL PAIN: ICD-10-CM

## 2024-06-10 DIAGNOSIS — M15.3 POST-TRAUMATIC OSTEOARTHRITIS OF MULTIPLE JOINTS: ICD-10-CM

## 2024-06-10 DIAGNOSIS — M54.12 CHRONIC RADICULAR CERVICAL PAIN: ICD-10-CM

## 2024-06-10 ASSESSMENT — PAIN SCALES - PAIN ENJOYMENT GENERAL ACTIVITY SCALE (PEG)
INTERFERED_GENERAL_ACTIVITY: 10
INTERFERED_GENERAL_ACTIVITY: 10 - COMPLETELY INTERFERES
AVG_PAIN_PASTWEEK: 8
INTERFERED_ENJOYMENT_LIFE: 8
PEG_TOTALSCORE: 8.67

## 2024-06-10 NOTE — TELEPHONE ENCOUNTER
Received fax from pharmacy requesting refill(s) for:    fentaNYL (DURAGESIC) 12 mcg/hr 72 hr patch      Last dispensed from pharmacy on (total 15 with 3 dispenses of 5)  05/17/24  #5 patches  05/26/24  #5 patches   06/06/24  #5 patches    Patient's last office/virtual visit by prescribing provider on 3/8/24  Next office/virtual appointment scheduled for 6/13/24    Last urine drug screen date 3/8/24  Current opioid agreement on file? Yes Date of opioid agreement: 3/8/24    E[facility]prescribe to:    [facility] ONLY [id number] - [place], [place] - [address]    Will route to nursing pool for review and preparation of prescription(s).

## 2024-06-11 RX ORDER — FENTANYL 12.5 UG/1
1 PATCH TRANSDERMAL
Qty: 15 PATCH | Refills: 0 | Status: SHIPPED | OUTPATIENT
Start: 2024-06-11 | End: 2024-07-16

## 2024-06-11 NOTE — PROGRESS NOTES
[facility]    CHIEF COMPLAINT: Chronic Pain.    INTERVAL HISTORY:  Last seen on 3/8/2024.       Recommendations/plan at the last visit included:  30 minutes Video or Clinic follow-up with ALPESH Tucker NP-C in 2 months.  Labs: Annual Urine Drug Screen   Medication Management : Medication changes effective March 8, 2024   Percocet 7.5/325 mg: SCHEDULED at 8 am, 1 pm, 5 pm and 9 pm   Percocet 7.5/325 mg: PRN between 12 am and 4 am if patient requests during the night  Fentanyl 12.5 mg: No changes     Since last visit:   - Cognition has declined significantly from last visit. Wife notes more crying, bad dreams.   - Having increased left shoulder pain, unable to lift/use left pain due to pain. Steroid injections are no longer effective.       Pain Information today: Worst Pain (10)/10. Location of pain: left shoulder is worst.    Annual Requirements last collected: 3/8/2024     Current Pain Relevant Medications:    Acetaminophen 500 mg 1-2 tabs q 8 h PRN  Buspar 15 mg TID   Diclofenac gel 1% BID PRN   Gabapentin 100 mg: Starting titration up to 300 mg TID.   Trazodone 100 mg at HS      Current Controlled Substance Medications:   Fentanyl 12 mcg change patch q48h = 30 MME/day  Oxycodone/APAP IR 7.5/325 mg QID PRN  Max 45 MME/day              Total opiate dose = 75 MME/day      Previous Pain Relevant Medications: (H--helped; HI--Helped initially; SWH--Somewhat helpful; NH--No help; W--worse; SE--side effects; ?--Unsure if helpful)   Opiates: Oxycodone:H, Fentanyl:?,   NSAIDS:   Migraine medications:   Muscle Relaxants:   Neuropathics:   Anti-depressants for pain:         Anxiety medications: Buspar:H,           Topicals: diclofenac:SWH  OTC medications:   Sleep Medications: Trazodone:H  Other medications not covered above: Vitamin D: H     SUBSTANCE HISTORY:   Past or current illegal drug use: Denies   Past or current ETOH use: Past   Nicotine/tobacco use: none   Daily Caffeine intake:  2-3 day     CURRENT FAMILY/SOCIAL SITUATION:  Past/Present occupation: Retired, was in the  then    Housing status: Assisted family   Emotional/Physical support: wife, sons  Safety Concerns: falls risk  Current stressors: denies      THE 4 As OF OPIOID MAINTENANCE ANALGESIA    Analgesia: Is pain relief clinically significant? YES   Activity: Is patient functional and able to perform Activities of Daily Living? YES   Adverse effects: Is patient free from adverse side effects from opiates? YES   Adherence to Rx protocol: Is patient adhering to Controlled Substance Agreement and taking medications ONLY as ordered? YES     Is Narcan prescribed for opiate use >50 MME daily or concurrent use of opiates and benzodiazepines? NO    [place] Board of Pharmacy Data Base Reviewed:    YES; No concern for abuse or misuse of controlled medications based on this report. Reviewed [facility] June 11, 2024- no concerning fills.      PHYSICAL EXAM    Vitals:    06/13/24 0812   BP: (!) 166/73   Pulse: 54   SpO2: 96%       Constitutional: healthy, somnolent, and no distress A&O.   Patient is appropriate.  Psychiatric/mental status: Lethargic, moderate confusion secondary to Alzheimer's disease     Neurologic exam:  CN:  Cranial nerves 2-12 are grossly normal.    MUSCULOSKELETAL: Physical exam deferred at this visit.     Posture: Upright, shoulders and pelvis are leveled. No  Antalgic Gait Pattern?: Yes Unable to stand, sitting in wheelchair during the appt.     DIRE Score for ongoing opioid management is calculated as follows:    Diagnosis = 3 pts (advanced condition; severe pain/objective findings)    Intractability = 3 pts (patient fully engaged but inadequate response to treatments)    Risk        Psych = 1 pt (serious personality dysfunction/mental illness that significantly interferes with care)         Chem Hlth = 3 pts (no history of chemical dependency; not drug-focused)       Reliability = 3 pts (highly reliable with  meds, appointments, treatments)       Social = 2 pts (reduction in some relationships/life rolls)       (Psych + Chem hlth + Reliability + Social) = 15    Efficacy = 1 pt (poor function; minimal pain relief despite mod/high med dose)    DIRE Score = 16        7-13: likely NOT suitable candidate for long-term opioid analgesia       14-21: may be a suitable candidate for long-term opioid analgesia     DIAGNOSTIC RESULTS:     No recent imaging has been done.      PAIN RELAVENT CONDITIONS:   1.  Post traumatic osteoarthritis multiple joints   2.  Chronic low back pain, mid back pain   3. Bilateral SI joint pain R>L   4.  Uncomplicated long term use of opiates.    DIAGNOSIS AND PLAN:     (G89.29) Chronic intractable pain  (primary encounter diagnosis)  (G62.9) Peripheral polyneuropathy  (M54.12,  G89.29) Chronic radicular cervical pain  (M54.50,  G89.29) Chronic bilateral low back pain, unspecified whether sciatica present  (M54.6,  G89.29) Chronic bilateral thoracic back pain  (M15.3) Post-traumatic osteoarthritis of multiple joints  Comment: Osvaldo presents for multiple chronic pain concerns. He no longer gets any significant relief from steroid injections. I will reach out to   Plan: oxyCODONE-acetaminophen (PERCOCET) 7.5-325 MG         per tablet    PATIENT INSTRUCTIONS:     Diagnosis reviewed, treatment option addressed, and risk/benefits discussed.  Self-care instructions given.  I am recommending a multidisciplinary treatment plan to help this patient better manage pain.    Remember to request ALL medication refills 5 BUSINESS days before you run out.     30 minutes Video follow-up with ALPESH Tucker NP-C in 3 months or sooner.   Procedures recommended: Anne will speak with Aria Tomlin PA-C at [facility] about a nerve ablation or a gel injection  Other: Release of information for [facility] for records and communication  Medication Management :   Discussed Ketamine/Ketoprofen topical cream.   Oxycodone  sent to the pharmacy     I have reviewed the note as documented above.  This accurately captures the substance of my conversation with the patient.  A total of 21 minutes of preparation, care, and consultation were spent on this visit today.     ALPESH Alamo, NP-C  [facility]    (Information in italics and blue color are taken from previous pain and consulting medical providers notes and are documented as such)

## 2024-06-11 NOTE — TELEPHONE ENCOUNTER
Script Eprescribed to pharmacy    Signed Prescriptions:                        Disp   Refills    fentaNYL (DURAGESIC) 12 mcg/hr 72 hr patch 15 pat*0        Sig: Place 1 patch onto the skin every 48 hours Ok to fill           06/20/24 to begin using 06/22/24 30 days supply           for chronic pain.  Authorizing Provider: LUCIAN HOPPER      Thank you for using My Chart to request your medication refills!     ALPESH Alamo, NP-C  [facility] [place]

## 2024-06-11 NOTE — TELEPHONE ENCOUNTER
Medication refill information reviewed.     Due date for fentaNYL (DURAGESIC) 12 mcg/hr 72 hr patch  is 06/22/24     Prescriptions prepped for review.     Will route to provider.

## 2024-06-12 ENCOUNTER — PATIENT OUTREACH (OUTPATIENT)
Dept: CARE COORDINATION | Facility: CLINIC | Age: 84
End: 2024-06-12
Payer: MEDICARE

## 2024-06-13 ENCOUNTER — OFFICE VISIT (OUTPATIENT)
Dept: PALLIATIVE MEDICINE | Facility: OTHER | Age: 84
End: 2024-06-13
Attending: NURSE PRACTITIONER
Payer: MEDICARE

## 2024-06-13 VITALS — DIASTOLIC BLOOD PRESSURE: 73 MMHG | HEART RATE: 54 BPM | SYSTOLIC BLOOD PRESSURE: 166 MMHG | OXYGEN SATURATION: 96 %

## 2024-06-13 DIAGNOSIS — M15.3 POST-TRAUMATIC OSTEOARTHRITIS OF MULTIPLE JOINTS: ICD-10-CM

## 2024-06-13 DIAGNOSIS — M54.12 CHRONIC RADICULAR CERVICAL PAIN: ICD-10-CM

## 2024-06-13 DIAGNOSIS — G89.29 CHRONIC RADICULAR CERVICAL PAIN: ICD-10-CM

## 2024-06-13 DIAGNOSIS — G62.9 PERIPHERAL POLYNEUROPATHY: ICD-10-CM

## 2024-06-13 DIAGNOSIS — G89.29 CHRONIC INTRACTABLE PAIN: Primary | ICD-10-CM

## 2024-06-13 DIAGNOSIS — M54.6 CHRONIC BILATERAL THORACIC BACK PAIN: ICD-10-CM

## 2024-06-13 DIAGNOSIS — M54.50 CHRONIC BILATERAL LOW BACK PAIN, UNSPECIFIED WHETHER SCIATICA PRESENT: ICD-10-CM

## 2024-06-13 DIAGNOSIS — G89.29 CHRONIC BILATERAL LOW BACK PAIN, UNSPECIFIED WHETHER SCIATICA PRESENT: ICD-10-CM

## 2024-06-13 DIAGNOSIS — G89.29 CHRONIC BILATERAL THORACIC BACK PAIN: ICD-10-CM

## 2024-06-13 PROCEDURE — 99214 OFFICE O/P EST MOD 30 MIN: CPT | Performed by: NURSE PRACTITIONER

## 2024-06-13 PROCEDURE — G0463 HOSPITAL OUTPT CLINIC VISIT: HCPCS | Performed by: NURSE PRACTITIONER

## 2024-06-13 PROCEDURE — G2211 COMPLEX E/M VISIT ADD ON: HCPCS | Performed by: NURSE PRACTITIONER

## 2024-06-13 ASSESSMENT — PAIN SCALES - GENERAL: PAINLEVEL: WORST PAIN (10)

## 2024-06-13 NOTE — PATIENT INSTRUCTIONS
After Visit Instructions:     Thank you for coming to [facility] for your care. It is my goal to partner with you to help you reach your optimal state of health.   Continue daily self-care, identifying contributing factors, and monitoring variations in pain level. Continue to integrate self-care into your life.      30 minutes Video follow-up with ALPESH Tucker NP-C in 3 months or sooner.   Procedures recommended: Anne will speak with [facility] about a nerve ablation or a gel injection  Other: Release of information for [facility] for records and communication  Medication Management :   Discussed Ketamine/Ketoprofen topical       ALPESH Alamo NP-C  [facility]  [facility] - Monday, Thursday and Friday  [facility] - Tuesday      Be sure to request ALL medication refills 5 days prior to the due date whether or not you will see your medical provider in an appointment before the due date.      Do not expect same day refills. If you do not plan ahead you may run out of medications.     Early refills are not provided.  It is your responsibility to manage your medications responsibility and keep them safely stored. Lost or destroyed medications WILL NOT be replaced    Scheduling/Clinic telephone Number for ALL locations:  [phone]    After Hours On-Call Service:  [phone]    Call with any questions about your care and for scheduling assistance.   Calls are returned Monday through Friday between 8 AM and 4:00 PM. We usually get back to you within 2 business days depending on the issue/request.    If we are prescribing your medications:  For opioid medication refills, call the clinic or send a iversity message 7 days in advance.  Please include:  Your name and date of birth.   Name of requested medication  Name of the pharmacy.  For non-opioid medications, call your pharmacy directly to request a refill. Please allow 3-4 days to be processed.    Per [place] State Law:  All controlled substance prescriptions must be filled within 30 days of being written.    For those controlled substances allowing refills, pickup must occur within 30 days of last fill.      We believe regular attendance is key to your success in our program!    Any time you are unable to keep your appointment we ask that you call us at least 24 hours in advance to cancel.This will allow us to offer the appointment time to another patient.   Multiple missed appointments may lead to dismissal from the clinic.

## 2024-06-13 NOTE — PROGRESS NOTES
Patient presents to the clinic today for a visit with ALPESH Garrido CNP regarding Pain Management.    Chief Complaint   Patient presents with    Pain Management     PT is present with shakiness. Son is present; and states that PT has been experiencing nightmares which PT describes as being startled at night. PT is emotional regarding pain. PT is also drowsy which is not typical according to son; family has expressed concern regarding patients functioning and daily life.       Son notes that cortisone shots have not been as effective as they use to be. Shoulder pain is expressed as a ten.      Worst Pain (10)( left shoulder)       BP (!) 166/73   Pulse 54   SpO2 96%                7/7/2022     3:26 PM 7/12/2023     3:02 PM   PHQ-9 SCORE   PHQ-9 Total Score MyChart 6 (Mild depression) 0   PHQ-9 Total Score 6 0           2/14/2023     5:47 PM 4/3/2023     6:18 PM 7/12/2023     3:03 PM   AXEL-7 SCORE   Total Score 0 (minimal anxiety) 0 (minimal anxiety) 1 (minimal anxiety)   Total Score 0 0 1           8/17/2023     8:58 AM 3/8/2024     7:56 AM 6/13/2024     8:11 AM   PEG Score   PEG Total Score 5 5.33 6.67       UDS: Mar. 8, 2024   CSA: Mar. 8, 2024           Medications: fentanyl/oxycodone       QUESTIONS:      Salima Sin, Clinic Facilitator  [facility]

## 2024-06-14 ENCOUNTER — MYC MEDICAL ADVICE (OUTPATIENT)
Dept: NEUROLOGY | Facility: CLINIC | Age: 84
End: 2024-06-14
Payer: MEDICARE

## 2024-06-16 RX ORDER — OXYCODONE AND ACETAMINOPHEN 7.5; 325 MG/1; MG/1
1 TABLET ORAL 4 TIMES DAILY
Qty: 150 TABLET | Refills: 0 | Status: SHIPPED | OUTPATIENT
Start: 2024-06-16 | End: 2024-07-24

## 2024-06-18 DIAGNOSIS — I25.718 CORONARY ARTERY DISEASE OF AUTOLOGOUS VEIN BYPASS GRAFT WITH STABLE ANGINA PECTORIS (H): ICD-10-CM

## 2024-06-18 RX ORDER — OLMESARTAN MEDOXOMIL 20 MG/1
TABLET ORAL
Qty: 90 TABLET | Refills: 0 | Status: SHIPPED | OUTPATIENT
Start: 2024-06-18 | End: 2024-08-29

## 2024-06-19 ENCOUNTER — OFFICE VISIT (OUTPATIENT)
Dept: CARDIOLOGY | Facility: CLINIC | Age: 84
End: 2024-06-19
Attending: FAMILY MEDICINE
Payer: MEDICARE

## 2024-06-19 VITALS — HEART RATE: 48 BPM | RESPIRATION RATE: 16 BRPM | SYSTOLIC BLOOD PRESSURE: 129 MMHG | DIASTOLIC BLOOD PRESSURE: 73 MMHG

## 2024-06-19 DIAGNOSIS — I25.718 CORONARY ARTERY DISEASE OF AUTOLOGOUS VEIN BYPASS GRAFT WITH STABLE ANGINA PECTORIS (H): Chronic | ICD-10-CM

## 2024-06-19 DIAGNOSIS — Z95.2 S/P TAVR (TRANSCATHETER AORTIC VALVE REPLACEMENT): Chronic | ICD-10-CM

## 2024-06-19 LAB
ALBUMIN SERPL BCG-MCNC: 3.5 G/DL (ref 3.5–5.2)
ALP SERPL-CCNC: 108 U/L (ref 40–150)
ALT SERPL W P-5'-P-CCNC: 12 U/L (ref 0–70)
ANION GAP SERPL CALCULATED.3IONS-SCNC: 5 MMOL/L (ref 7–15)
AST SERPL W P-5'-P-CCNC: 26 U/L (ref 0–45)
ATRIAL RATE - MUSE: 51 BPM
BILIRUB SERPL-MCNC: 0.5 MG/DL
BUN SERPL-MCNC: 14.6 MG/DL (ref 8–23)
CALCIUM SERPL-MCNC: 9.6 MG/DL (ref 8.8–10.2)
CHLORIDE SERPL-SCNC: 101 MMOL/L (ref 98–107)
CREAT SERPL-MCNC: 0.88 MG/DL (ref 0.67–1.17)
DEPRECATED HCO3 PLAS-SCNC: 33 MMOL/L (ref 22–29)
DIASTOLIC BLOOD PRESSURE - MUSE: NORMAL MMHG
EGFRCR SERPLBLD CKD-EPI 2021: 84 ML/MIN/1.73M2
ERYTHROCYTE [DISTWIDTH] IN BLOOD BY AUTOMATED COUNT: 14 % (ref 10–15)
GLUCOSE SERPL-MCNC: 94 MG/DL (ref 70–99)
HCT VFR BLD AUTO: 36.1 % (ref 40–53)
HGB BLD-MCNC: 11.3 G/DL (ref 13.3–17.7)
INTERPRETATION ECG - MUSE: NORMAL
MCH RBC QN AUTO: 31.1 PG (ref 26.5–33)
MCHC RBC AUTO-ENTMCNC: 31.3 G/DL (ref 31.5–36.5)
MCV RBC AUTO: 99 FL (ref 78–100)
P AXIS - MUSE: 67 DEGREES
PLATELET # BLD AUTO: 177 10E3/UL (ref 150–450)
POTASSIUM SERPL-SCNC: 4.1 MMOL/L (ref 3.4–5.3)
PR INTERVAL - MUSE: 232 MS
PROT SERPL-MCNC: 6.5 G/DL (ref 6.4–8.3)
QRS DURATION - MUSE: 106 MS
QT - MUSE: 460 MS
QTC - MUSE: 423 MS
R AXIS - MUSE: 36 DEGREES
RBC # BLD AUTO: 3.63 10E6/UL (ref 4.4–5.9)
SODIUM SERPL-SCNC: 139 MMOL/L (ref 135–145)
SYSTOLIC BLOOD PRESSURE - MUSE: NORMAL MMHG
T AXIS - MUSE: 5 DEGREES
VENTRICULAR RATE- MUSE: 51 BPM
WBC # BLD AUTO: 5.8 10E3/UL (ref 4–11)

## 2024-06-19 PROCEDURE — 93000 ELECTROCARDIOGRAM COMPLETE: CPT | Performed by: INTERNAL MEDICINE

## 2024-06-19 PROCEDURE — 99204 OFFICE O/P NEW MOD 45 MIN: CPT | Performed by: INTERNAL MEDICINE

## 2024-06-19 PROCEDURE — 36415 COLL VENOUS BLD VENIPUNCTURE: CPT | Performed by: INTERNAL MEDICINE

## 2024-06-19 PROCEDURE — 85027 COMPLETE CBC AUTOMATED: CPT | Performed by: INTERNAL MEDICINE

## 2024-06-19 PROCEDURE — 80053 COMPREHEN METABOLIC PANEL: CPT | Performed by: INTERNAL MEDICINE

## 2024-06-19 RX ORDER — FUROSEMIDE 20 MG
1 TABLET ORAL
COMMUNITY
Start: 2024-06-03 | End: 2024-08-24

## 2024-06-19 RX ORDER — ISOSORBIDE MONONITRATE 30 MG/1
TABLET, EXTENDED RELEASE ORAL
COMMUNITY

## 2024-06-19 RX ORDER — ISOSORBIDE MONONITRATE 30 MG/1
30 TABLET, EXTENDED RELEASE ORAL DAILY
Qty: 90 TABLET | Refills: 3 | Status: SHIPPED | OUTPATIENT
Start: 2024-06-19

## 2024-06-19 NOTE — PATIENT INSTRUCTIONS
Mr. Sina Gomez,     It was a pleasure to see you in the office today. My recommendations for you include:   1. Start imdur 30 mg daily  2. Agree with follow up with neurology  3. Will check EKG and labs today     Please do not hesitate to call the [facility] clinic with any questions or concerns at [phone].    Sincerely,     Viri Mcclure MD

## 2024-06-19 NOTE — PROGRESS NOTES
HEART CARE ENCOUNTER CONSULTATON NOTE      [facility]  [phone]      Assessment/Recommendations   Assessment:  Progressive alzheimer's dementia  Coronary artery disease status post CABG 1999 (angiogram 2020 LIMA-LAD, SVG-OM, SVG-PDA, occluded SVG-OM2). Occasional episodes of chest pain relieved with SL NTG.  Patient unable to provide history today. Patient's son states that patient and family would want to avoid further procedures at this time and would like medical therapy.   PAD status post L fem-pop and carotid disease   Aortic stenosis status post TAVR 2020  KALIA not on CPAP  Hypertension: well controlled  Dyslipidemia: well controlled  CVA  Severely deconditioned  Sinus bradycardia: discussed a monitor and they declined at this time  Plan:  1. Start imdur 30 mg daily  2. Agree with follow up with neurology  3. Follow up 6 months       History of Present Illness/Subjective    HPI: Sina Gomez is a 83 year old male with history of progressive alzheimer's dementia, mainly wheelchair bound, coronary artery disease status post CABG 1999 (angiogram 2020 LIMA-LAD, SVG-OM, SVG-PDA, occluded SVG-OM2), PAD status post L fem-pop, aortic stenosis status post TAVR 2020, KALIA not on CPAP, hypertension dyslipidemia, CVA who I am seeing to establish care.  He is here accompanied by his son. The patient could not give much history due to the severity of his alzheimers. His son reports that the patient has had a few episodes of chest pain and has taken nitroglycerin.  He moved here with his wife from [place] a few years ago and is staying in assisting living.  No complaints of dyspnea, edema, orthopnea, palpitations.  No syncopal episodes. EKG today showed sinus bradycardia at 51 bpm with first degree AVB.     Echocardiogram 3/29/23     Technically challenging study due to patient body habitus.     Left ventricular systolic function is normal. The visual ejection fraction is  55-60%. Hypokinesis of  the basal to mid inferior wall segments.  Right ventricular global function is normal.  There is a bioprosthetic aortic valve. Valve morphology not well visualized.  Normal function on Doppler interrogation. Vmax 2.5 m/s, mean gradient 11 mmHg.  DI 0.38. No aortic insufficiency.  Dilation of the inferior vena cava is present with normal respiratory  variation in diameter.     This study was compared to a TTE from 6/2/2022, there has been no significant  change in biventricular function, bioprosthetic AV function on direct visual  comparison.         Physical Examination  Review of Systems   Vitals: /73 (BP Location: Right arm, Patient Position: Sitting, Cuff Size: Adult Regular)   Pulse (!) 48   Resp 16   BMI= There is no height or weight on file to calculate BMI.  Wt Readings from Last 3 Encounters:   12/04/23 93.4 kg (206 lb)   08/17/23 89.8 kg (198 lb)   07/12/23 90.5 kg (199 lb 8 oz)       General Appearance:   Not very verbal, wheelchair   ENT/Mouth: membranes moist, no oral lesions or bleeding gums.      EYES:  no scleral icterus, normal conjunctivae   Neck: no carotid bruits or thyromegaly   Chest/Lungs:   lungs are clear to auscultation   Cardiovascular:   Regular. Normal first and second heart sounds with soft systolic murmur no edema bilaterally        Extremities: no cyanosis or clubbing   Skin: no xanthelasma, warm.    Neurologic: normal  bilateral, no tremors     Psychiatric: alert and oriented x3, calm        Please refer above for cardiac ROS details.        Medical History  Surgical History Family History Social History   Past Medical History:   Diagnosis Date    Cerebral artery occlusion with cerebral infarction (H)     Coronary artery disease     Depression     Sepsis (H) 06/14/2020    UTI with septic hock    Sleep apnea     TBI (traumatic brain injury) (H) 1968     Past Surgical History:   Procedure Laterality Date    COLONOSCOPY N/A 7/20/2022    Procedure: COLONOSCOPY;  Surgeon:  Dexter Schmitt MD;  Location: [facility] Main OR    CV FEMORAL ANGIOGRAM      HC VALVE (TAVR)      left hip replacement Left 1995    REPLACEMENT TOTAL KNEE Left      No family history of heart disease     Social History     Socioeconomic History    Marital status:      Spouse name: Not on file    Number of children: Not on file    Years of education: Not on file    Highest education level: Not on file   Occupational History    Not on file   Tobacco Use    Smoking status: Former     Current packs/day: 0.00     Types: Cigarettes     Quit date:      Years since quittin.4     Passive exposure: Never    Smokeless tobacco: Never   Vaping Use    Vaping status: Never Used   Substance and Sexual Activity    Alcohol use: Never    Drug use: Never    Sexual activity: Not on file   Other Topics Concern    Not on file   Social History Narrative    Not on file     Social Determinants of Health     Financial Resource Strain: High Risk (2022)    [facility], [facility]    Financial Resource Strain     Difficulty of Paying Living Expenses: Not on file     Difficulty of Paying Living Expenses: Not on file   Food Insecurity: Not on file   Transportation Needs: Not on file   Physical Activity: Not on file   Stress: Not on file   Social Connections: Unknown (2022)    Received [facility], [facility]    Social Connections     Frequency of Communication with Friends and Family: Not on file   Interpersonal Safety: Not on file   Housing Stability: Not on file           Medications  Allergies   Current Outpatient Medications   Medication Sig Dispense Refill    acetaminophen (TYLENOL) 500 MG tablet Take 1-2 tablets (500-1,000 mg) by mouth every 8 hours as needed for mild pain 30 tablet 0    aspirin (ASA) 81 MG chewable tablet Take 1 tablet (81 mg) by mouth  daily 90 tablet 1    atorvastatin (LIPITOR) 40 MG tablet TAKE 1 AND 1/2 TABS (60MG) BY MOUTH AT BEDTIME 135 tablet 3    busPIRone (BUSPAR) 15 MG tablet TAKE 1 TAB BY MOUTH EVERY 8HOURS 90 tablet 11    clopidogrel (PLAVIX) 75 MG tablet TAKE 1 TABLET BY MOUTH ONCE DAILY 90 tablet 3    donepezil (ARICEPT) 10 MG tablet TAKE 1 TABLET (10 MG) BY MOUTH AT BEDTIME 90 tablet 1    fentaNYL (DURAGESIC) 12 mcg/hr 72 hr patch Place 1 patch onto the skin every 48 hours Ok to fill 06/20/24 to begin using 06/22/24 30 days supply for chronic pain. 15 patch 0    furosemide (LASIX) 20 MG tablet Take 1 tablet by mouth daily at 2 pm      gabapentin (NEURONTIN) 300 MG capsule TAKE 1 CAPSULE BY MOUTH THREE TIMES DAILY 90 capsule 5    levothyroxine (SYNTHROID/LEVOTHROID) 50 MCG tablet TAKE 1 TABLET (50 MCG) BY MOUTH DAILY 30 tablet 0    naloxone (NARCAN) 4 MG/0.1ML nasal spray Spray 1 spray (4 mg) into one nostril alternating nostrils as needed for opioid reversal every 2-3 minutes until assistance arrives 0.2 mL 1    nitroGLYcerin (NITROSTAT) 0.4 MG sublingual tablet PLACE 1 TAB UNDER TONGUE EVERY 5min AS NEEDED FOR CHEST PAIN MAX 3 TABS/EPISODE 25 tablet 11    nystatin (MYCOSTATIN) 563123 UNIT/GM external powder Apply topically 2 times daily To groin area.  Okay to self administer      olmesartan (BENICAR) 20 MG tablet TAKE 1 TAB BY MOUTH ONCE DAILY 90 tablet 0    oxyCODONE-acetaminophen (PERCOCET) 7.5-325 MG per tablet Take 1 tablet by mouth 4 times daily Scheduled. 1 tab at 8 am, 1 pm, 5 pm, and 9 pm. May also take 1 tablet by mouth daily as needed for severe pain Between 12 AM and 4 AM. Ok to fill 6/18/2024 to start using on 6/20/2024. 150 tablet 0    polyethylene glycol (MIRALAX) 17 GM/Dose powder Take as directed for colonoscopy prep 255 g 0    sertraline (ZOLOFT) 100 MG tablet TAKE 1 TABLET BY MOUTH DAILY 90 tablet 3    traZODone (DESYREL) 100 MG tablet TAKE 1 TABLET BY MOUTH AT BEDTIME 90 tablet 1    isosorbide mononitrate (IMDUR)  "30 MG 24 hr tablet          Allergies   Allergen Reactions    Penicillins Difficulty breathing          Lab Results    Chemistry/lipid CBC Cardiac Enzymes/BNP/TSH/INR   Recent Labs   Lab Test 12/14/23  1450   CHOL 126   HDL 51   LDL 57   TRIG 88     Recent Labs   Lab Test 12/14/23  1450 03/22/21  0753   LDL 57 74     Recent Labs   Lab Test 04/04/23  1207      POTASSIUM 4.0   CHLORIDE 101   CO2 28   GLC 98   BUN 8.9   CR 0.71   GFRESTIMATED 90   MARCIA 9.4     Recent Labs   Lab Test 04/04/23  1207 03/31/23  0507 03/30/23  0414   CR 0.71 0.70 0.69     Recent Labs   Lab Test 12/14/23  1450   A1C 5.6          Recent Labs   Lab Test 07/12/23  1623 04/04/23  1207   WBC  --  6.1   HGB 12.3* 11.2*   HCT  --  37.2*   MCV  --  99   PLT  --  287     Recent Labs   Lab Test 07/12/23  1623 04/04/23  1207 03/31/23  0507   HGB 12.3* 11.2* 10.8*    No results for input(s): \"TROPONINI\" in the last 01245 hours.  No results for input(s): \"BNP\", \"NTBNPI\", \"NTBNP\" in the last 50578 hours.  Recent Labs   Lab Test 03/19/23  1509   TSH 5.52*     Recent Labs   Lab Test 05/16/22  1945   INR 1.10        Viri Mcclure MD                                      "

## 2024-06-19 NOTE — LETTER
6/19/2024    Brionna White MD  [address] [facility] [address]    RE: Sina Gomez       Dear Colleague,     I had the pleasure of seeing Sina Gomez in the [facility].    HEART CARE ENCOUNTER CONSULTATON NOTE      [facility]  [phone]      Assessment/Recommendations   Assessment:  Progressive alzheimer's dementia  Coronary artery disease status post CABG 1999 (angiogram 2020 LIMA-LAD, SVG-OM, SVG-PDA, occluded SVG-OM2). Occasional episodes of chest pain relieved with SL NTG.  Patient unable to provide history today. Patient's son states that patient and family would want to avoid further procedures at this time and would like medical therapy.   PAD status post L fem-pop and carotid disease   Aortic stenosis status post TAVR 2020  KALIA not on CPAP  Hypertension: well controlled  Dyslipidemia: well controlled  CVA  Severely deconditioned  Sinus bradycardia: discussed a monitor and they declined at this time  Plan:  1. Start imdur 30 mg daily  2. Agree with follow up with neurology  3. Follow up 6 months       History of Present Illness/Subjective    HPI: Sina Gomez is a 83 year old male with history of progressive alzheimer's dementia, mainly wheelchair bound, coronary artery disease status post CABG 1999 (angiogram 2020 LIMA-LAD, SVG-OM, SVG-PDA, occluded SVG-OM2), PAD status post L fem-pop, aortic stenosis status post TAVR 2020, KALIA not on CPAP, hypertension dyslipidemia, CVA who I am seeing to establish care.  He is here accompanied by his son. The patient could not give much history due to the severity of his alzheimers. His son reports that the patient has had a few episodes of chest pain and has taken nitroglycerin.  He moved here with his wife from [place] a few years ago and is staying in assisting living.  No complaints of dyspnea, edema, orthopnea, palpitations.  No syncopal episodes. EKG today showed sinus bradycardia at 51 bpm with  first degree AVB.     Echocardiogram 3/29/23     Technically challenging study due to patient body habitus.     Left ventricular systolic function is normal. The visual ejection fraction is  55-60%. Hypokinesis of the basal to mid inferior wall segments.  Right ventricular global function is normal.  There is a bioprosthetic aortic valve. Valve morphology not well visualized.  Normal function on Doppler interrogation. Vmax 2.5 m/s, mean gradient 11 mmHg.  DI 0.38. No aortic insufficiency.  Dilation of the inferior vena cava is present with normal respiratory  variation in diameter.     This study was compared to a TTE from 6/2/2022, there has been no significant  change in biventricular function, bioprosthetic AV function on direct visual  comparison.         Physical Examination  Review of Systems   Vitals: /73 (BP Location: Right arm, Patient Position: Sitting, Cuff Size: Adult Regular)   Pulse (!) 48   Resp 16   BMI= There is no height or weight on file to calculate BMI.  Wt Readings from Last 3 Encounters:   12/04/23 93.4 kg (206 lb)   08/17/23 89.8 kg (198 lb)   07/12/23 90.5 kg (199 lb 8 oz)       General Appearance:   Not very verbal, wheelchair   ENT/Mouth: membranes moist, no oral lesions or bleeding gums.      EYES:  no scleral icterus, normal conjunctivae   Neck: no carotid bruits or thyromegaly   Chest/Lungs:   lungs are clear to auscultation   Cardiovascular:   Regular. Normal first and second heart sounds with soft systolic murmur no edema bilaterally        Extremities: no cyanosis or clubbing   Skin: no xanthelasma, warm.    Neurologic: normal  bilateral, no tremors     Psychiatric: alert and oriented x3, calm        Please refer above for cardiac ROS details.        Medical History  Surgical History Family History Social History   Past Medical History:   Diagnosis Date    Cerebral artery occlusion with cerebral infarction (H)     Coronary artery disease     Depression     Sepsis (H)  2020    UTI with septic hock    Sleep apnea     TBI (traumatic brain injury) (H) 1968     Past Surgical History:   Procedure Laterality Date    COLONOSCOPY N/A 2022    Procedure: COLONOSCOPY;  Surgeon: Dexter Schmitt MD;  Location: [facility] Main OR    CV FEMORAL ANGIOGRAM      HC VALVE (TAVR)      left hip replacement Left 1995    REPLACEMENT TOTAL KNEE Left      No family history of heart disease     Social History     Socioeconomic History    Marital status:      Spouse name: Not on file    Number of children: Not on file    Years of education: Not on file    Highest education level: Not on file   Occupational History    Not on file   Tobacco Use    Smoking status: Former     Current packs/day: 0.00     Types: Cigarettes     Quit date:      Years since quittin.4     Passive exposure: Never    Smokeless tobacco: Never   Vaping Use    Vaping status: Never Used   Substance and Sexual Activity    Alcohol use: Never    Drug use: Never    Sexual activity: Not on file   Other Topics Concern    Not on file   Social History Narrative    Not on file     Social Determinants of Health     Financial Resource Strain: High Risk (2022)    Received from [facility], [facility]    Financial Resource Strain     Difficulty of Paying Living Expenses: Not on file     Difficulty of Paying Living Expenses: Not on file   Food Insecurity: Not on file   Transportation Needs: Not on file   Physical Activity: Not on file   Stress: Not on file   Social Connections: Unknown (2022)    Received from [facility], [facility]    Social Connections     Frequency of Communication with Friends and Family: Not on file   Interpersonal Safety: Not on file   Housing Stability: Not on file           Medications  Allergies   Current Outpatient Medications   Medication Sig  Dispense Refill    acetaminophen (TYLENOL) 500 MG tablet Take 1-2 tablets (500-1,000 mg) by mouth every 8 hours as needed for mild pain 30 tablet 0    aspirin (ASA) 81 MG chewable tablet Take 1 tablet (81 mg) by mouth daily 90 tablet 1    atorvastatin (LIPITOR) 40 MG tablet TAKE 1 AND 1/2 TABS (60MG) BY MOUTH AT BEDTIME 135 tablet 3    busPIRone (BUSPAR) 15 MG tablet TAKE 1 TAB BY MOUTH EVERY 8HOURS 90 tablet 11    clopidogrel (PLAVIX) 75 MG tablet TAKE 1 TABLET BY MOUTH ONCE DAILY 90 tablet 3    donepezil (ARICEPT) 10 MG tablet TAKE 1 TABLET (10 MG) BY MOUTH AT BEDTIME 90 tablet 1    fentaNYL (DURAGESIC) 12 mcg/hr 72 hr patch Place 1 patch onto the skin every 48 hours Ok to fill 06/20/24 to begin using 06/22/24 30 days supply for chronic pain. 15 patch 0    furosemide (LASIX) 20 MG tablet Take 1 tablet by mouth daily at 2 pm      gabapentin (NEURONTIN) 300 MG capsule TAKE 1 CAPSULE BY MOUTH THREE TIMES DAILY 90 capsule 5    levothyroxine (SYNTHROID/LEVOTHROID) 50 MCG tablet TAKE 1 TABLET (50 MCG) BY MOUTH DAILY 30 tablet 0    naloxone (NARCAN) 4 MG/0.1ML nasal spray Spray 1 spray (4 mg) into one nostril alternating nostrils as needed for opioid reversal every 2-3 minutes until assistance arrives 0.2 mL 1    nitroGLYcerin (NITROSTAT) 0.4 MG sublingual tablet PLACE 1 TAB UNDER TONGUE EVERY 5min AS NEEDED FOR CHEST PAIN MAX 3 TABS/EPISODE 25 tablet 11    nystatin (MYCOSTATIN) 153270 UNIT/GM external powder Apply topically 2 times daily To groin area.  Okay to self administer      olmesartan (BENICAR) 20 MG tablet TAKE 1 TAB BY MOUTH ONCE DAILY 90 tablet 0    oxyCODONE-acetaminophen (PERCOCET) 7.5-325 MG per tablet Take 1 tablet by mouth 4 times daily Scheduled. 1 tab at 8 am, 1 pm, 5 pm, and 9 pm. May also take 1 tablet by mouth daily as needed for severe pain Between 12 AM and 4 AM. Ok to fill 6/18/2024 to start using on 6/20/2024. 150 tablet 0    polyethylene glycol (MIRALAX) 17 GM/Dose powder Take as directed for  "colonoscopy prep 255 g 0    sertraline (ZOLOFT) 100 MG tablet TAKE 1 TABLET BY MOUTH DAILY 90 tablet 3    traZODone (DESYREL) 100 MG tablet TAKE 1 TABLET BY MOUTH AT BEDTIME 90 tablet 1    isosorbide mononitrate (IMDUR) 30 MG 24 hr tablet          Allergies   Allergen Reactions    Penicillins Difficulty breathing          Lab Results    Chemistry/lipid CBC Cardiac Enzymes/BNP/TSH/INR   Recent Labs   Lab Test 12/14/23  1450   CHOL 126   HDL 51   LDL 57   TRIG 88     Recent Labs   Lab Test 12/14/23  1450 03/22/21  0753   LDL 57 74     Recent Labs   Lab Test 04/04/23  1207      POTASSIUM 4.0   CHLORIDE 101   CO2 28   GLC 98   BUN 8.9   CR 0.71   GFRESTIMATED 90   MARCIA 9.4     Recent Labs   Lab Test 04/04/23  1207 03/31/23  0507 03/30/23  0414   CR 0.71 0.70 0.69     Recent Labs   Lab Test 12/14/23  1450   A1C 5.6          Recent Labs   Lab Test 07/12/23  1623 04/04/23  1207   WBC  --  6.1   HGB 12.3* 11.2*   HCT  --  37.2*   MCV  --  99   PLT  --  287     Recent Labs   Lab Test 07/12/23  1623 04/04/23  1207 03/31/23  0507   HGB 12.3* 11.2* 10.8*    No results for input(s): \"TROPONINI\" in the last 69772 hours.  No results for input(s): \"BNP\", \"NTBNPI\", \"NTBNP\" in the last 95553 hours.  Recent Labs   Lab Test 03/19/23  1509   TSH 5.52*     Recent Labs   Lab Test 05/16/22  1945   INR 1.10        Viri Mcclure MD    Thank you for allowing me to participate in the care of your patient.      Sincerely,   Viri Mcclure MD     [facility]  cc:   Brionna White MD  [address] [place] [address]      "

## 2024-06-26 ENCOUNTER — PATIENT OUTREACH (OUTPATIENT)
Dept: CARE COORDINATION | Facility: CLINIC | Age: 84
End: 2024-06-26
Payer: MEDICARE

## 2024-07-01 ENCOUNTER — OFFICE VISIT (OUTPATIENT)
Dept: NEUROLOGY | Facility: CLINIC | Age: 84
End: 2024-07-01
Payer: MEDICARE

## 2024-07-01 VITALS
HEIGHT: 71 IN | BODY MASS INDEX: 28.84 KG/M2 | WEIGHT: 206 LBS | DIASTOLIC BLOOD PRESSURE: 65 MMHG | SYSTOLIC BLOOD PRESSURE: 127 MMHG | HEART RATE: 57 BPM

## 2024-07-01 DIAGNOSIS — F02.80 MAJOR NEUROCOGNITIVE DISORDER DUE TO ALZHEIMER'S DISEASE (H): ICD-10-CM

## 2024-07-01 DIAGNOSIS — R41.89 SUBJECTIVE MEMORY COMPLAINTS: Primary | ICD-10-CM

## 2024-07-01 DIAGNOSIS — R41.89 COGNITIVE DECLINE: ICD-10-CM

## 2024-07-01 DIAGNOSIS — G30.9 MAJOR NEUROCOGNITIVE DISORDER DUE TO ALZHEIMER'S DISEASE (H): ICD-10-CM

## 2024-07-01 DIAGNOSIS — R41.9 COGNITIVE COMPLAINTS: ICD-10-CM

## 2024-07-01 DIAGNOSIS — Z87.820 HX OF TRAUMATIC BRAIN INJURY: ICD-10-CM

## 2024-07-01 PROCEDURE — 99215 OFFICE O/P EST HI 40 MIN: CPT | Performed by: PSYCHIATRY & NEUROLOGY

## 2024-07-01 PROCEDURE — G2211 COMPLEX E/M VISIT ADD ON: HCPCS | Performed by: PSYCHIATRY & NEUROLOGY

## 2024-07-01 RX ORDER — OLANZAPINE 2.5 MG/1
2.5 TABLET, FILM COATED ORAL AT BEDTIME
Qty: 90 TABLET | Refills: 1 | Status: SHIPPED | OUTPATIENT
Start: 2024-07-01

## 2024-07-01 RX ORDER — MEMANTINE HYDROCHLORIDE 5 MG/1
5 TABLET ORAL 2 TIMES DAILY
Qty: 360 TABLET | Refills: 3 | Status: SHIPPED | OUTPATIENT
Start: 2024-07-01
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
## 2024-07-01 NOTE — NURSING NOTE
Chief Complaint   Patient presents with    Memory Loss     Patient states he needs to repeat in his head before he can speak. He has forgetfulness.     Jazmine Ceballos on 7/1/2024 at 12:12 PM
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
## 2024-07-02 ENCOUNTER — LAB (OUTPATIENT)
Dept: LAB | Facility: CLINIC | Age: 84
End: 2024-07-02
Payer: MEDICARE

## 2024-07-02 DIAGNOSIS — R41.9 COGNITIVE COMPLAINTS: ICD-10-CM

## 2024-07-02 DIAGNOSIS — R41.89 SUBJECTIVE MEMORY COMPLAINTS: ICD-10-CM

## 2024-07-02 DIAGNOSIS — R41.89 COGNITIVE DECLINE: ICD-10-CM

## 2024-07-02 LAB
ALBUMIN UR-MCNC: NEGATIVE MG/DL
APPEARANCE UR: CLEAR
BILIRUB UR QL STRIP: NEGATIVE
COLOR UR AUTO: YELLOW
GLUCOSE UR STRIP-MCNC: NEGATIVE MG/DL
HGB UR QL STRIP: NEGATIVE
KETONES UR STRIP-MCNC: NEGATIVE MG/DL
LEUKOCYTE ESTERASE UR QL STRIP: NEGATIVE
NITRATE UR QL: NEGATIVE
PH UR STRIP: 5.5 [PH] (ref 5–7)
SP GR UR STRIP: 1.01 (ref 1–1.03)
T4 FREE SERPL-MCNC: 0.94 NG/DL (ref 0.9–1.7)
TSH SERPL DL<=0.005 MIU/L-ACNC: 4.69 UIU/ML (ref 0.3–4.2)
UROBILINOGEN UR STRIP-ACNC: 0.2 E.U./DL

## 2024-07-02 PROCEDURE — 81003 URINALYSIS AUTO W/O SCOPE: CPT

## 2024-07-02 PROCEDURE — 82607 VITAMIN B-12: CPT

## 2024-07-02 PROCEDURE — 84439 ASSAY OF FREE THYROXINE: CPT

## 2024-07-02 PROCEDURE — 36415 COLL VENOUS BLD VENIPUNCTURE: CPT

## 2024-07-02 PROCEDURE — 84443 ASSAY THYROID STIM HORMONE: CPT

## 2024-07-03 LAB — VIT B12 SERPL-MCNC: 707 PG/ML (ref 232–1245)

## 2024-07-16 ENCOUNTER — TELEPHONE (OUTPATIENT)
Dept: PALLIATIVE MEDICINE | Facility: OTHER | Age: 84
End: 2024-07-16

## 2024-07-16 DIAGNOSIS — M54.6 CHRONIC BILATERAL THORACIC BACK PAIN: ICD-10-CM

## 2024-07-16 DIAGNOSIS — M15.3 POST-TRAUMATIC OSTEOARTHRITIS OF MULTIPLE JOINTS: ICD-10-CM

## 2024-07-16 DIAGNOSIS — M54.12 CHRONIC RADICULAR CERVICAL PAIN: ICD-10-CM

## 2024-07-16 DIAGNOSIS — G89.29 CHRONIC RADICULAR CERVICAL PAIN: ICD-10-CM

## 2024-07-16 DIAGNOSIS — G89.29 CHRONIC BILATERAL LOW BACK PAIN, UNSPECIFIED WHETHER SCIATICA PRESENT: ICD-10-CM

## 2024-07-16 DIAGNOSIS — M54.50 CHRONIC BILATERAL LOW BACK PAIN, UNSPECIFIED WHETHER SCIATICA PRESENT: ICD-10-CM

## 2024-07-16 DIAGNOSIS — G62.9 PERIPHERAL POLYNEUROPATHY: ICD-10-CM

## 2024-07-16 DIAGNOSIS — G89.29 CHRONIC INTRACTABLE PAIN: ICD-10-CM

## 2024-07-16 DIAGNOSIS — G89.29 CHRONIC BILATERAL THORACIC BACK PAIN: ICD-10-CM

## 2024-07-16 NOTE — TELEPHONE ENCOUNTER
Received call from patient requesting refill(s) fentaNYL (DURAGESIC) 12 mcg/hr 72 hr patch    Last dispensed from pharmacy on 07/11/2024    Patient's last office/virtual visit by prescribing provider on 06/13/2024  Next office/virtual appointment scheduled for None    Last urine drug screen date 03/08/2024  Current opioid agreement on file (completed within the last year) Yes Date of opioid agreement: 03/08/2024    E-prescribe to   [facility] ONLY #[id number] - [facility] [address] [place] [address], pharmacy    Will route to nursing Sandy for review and preparation of prescription(s).     Milagros Gaitan MA  [facility]

## 2024-07-17 RX ORDER — FENTANYL 12.5 UG/1
1 PATCH TRANSDERMAL
Qty: 15 PATCH | Refills: 0 | Status: SHIPPED | OUTPATIENT
Start: 2024-07-17 | End: 2024-08-15

## 2024-07-17 NOTE — TELEPHONE ENCOUNTER
Medication refill information reviewed. Last filled on 07/11/24 for a 10 day supply. The LT pharmacy fills the scripts in 10 day supplies so he is actually due for a refill.     Due date for fentaNYL (DURAGESIC) 12 mcg/hr 72 hr patch is 07/22/24     Prescriptions prepped for review.     Will route to provider.

## 2024-07-18 NOTE — TELEPHONE ENCOUNTER
I signed the refill however can they figure out how to accept a 30 day refill. It's not reasonable to have to refill every 10 days.     Thanks, Anne

## 2024-07-18 NOTE — TELEPHONE ENCOUNTER
Call placed to [facility] Pharmacist. She states the facility will only accept 1 box at a time as they don't have room in their locked med drawer for 2 boxes.

## 2024-07-23 ENCOUNTER — TELEPHONE (OUTPATIENT)
Dept: PALLIATIVE MEDICINE | Facility: CLINIC | Age: 84
End: 2024-07-23

## 2024-07-23 NOTE — TELEPHONE ENCOUNTER
Fax received from     [facility] Only #[id number] - [place], [place] - [address]  [address]  [address]  [facility] [address]  Phone: [phone] Fax: [phone]    PA needed for oxyCODONE-acetaminophen (PERCOCET) 7.5-325 MG per tablet    CoverMyMeds Key: [id number]

## 2024-07-24 ENCOUNTER — TELEPHONE (OUTPATIENT)
Dept: PALLIATIVE MEDICINE | Facility: CLINIC | Age: 84
End: 2024-07-24
Payer: MEDICARE

## 2024-07-24 DIAGNOSIS — G89.29 CHRONIC INTRACTABLE PAIN: ICD-10-CM

## 2024-07-24 DIAGNOSIS — G89.29 CHRONIC BILATERAL LOW BACK PAIN, UNSPECIFIED WHETHER SCIATICA PRESENT: ICD-10-CM

## 2024-07-24 DIAGNOSIS — G89.29 CHRONIC BILATERAL THORACIC BACK PAIN: ICD-10-CM

## 2024-07-24 DIAGNOSIS — G89.29 CHRONIC RADICULAR CERVICAL PAIN: ICD-10-CM

## 2024-07-24 DIAGNOSIS — M15.3 POST-TRAUMATIC OSTEOARTHRITIS OF MULTIPLE JOINTS: ICD-10-CM

## 2024-07-24 DIAGNOSIS — M54.12 CHRONIC RADICULAR CERVICAL PAIN: ICD-10-CM

## 2024-07-24 DIAGNOSIS — M54.6 CHRONIC BILATERAL THORACIC BACK PAIN: ICD-10-CM

## 2024-07-24 DIAGNOSIS — G62.9 PERIPHERAL POLYNEUROPATHY: ICD-10-CM

## 2024-07-24 DIAGNOSIS — M54.50 CHRONIC BILATERAL LOW BACK PAIN, UNSPECIFIED WHETHER SCIATICA PRESENT: ICD-10-CM

## 2024-07-24 RX ORDER — OXYCODONE AND ACETAMINOPHEN 7.5; 325 MG/1; MG/1
1 TABLET ORAL 4 TIMES DAILY
Qty: 150 TABLET | Refills: 0 | Status: SHIPPED | OUTPATIENT
Start: 2024-07-24 | End: 2024-09-05

## 2024-07-24 NOTE — TELEPHONE ENCOUNTER
Medication refill information reviewed.     Due date for oxyCODONE-acetaminophen (PERCOCET) 7.5-325 MG per tablet is 07/24/24     Prescriptions prepped for review.     Will route to provider.

## 2024-07-24 NOTE — TELEPHONE ENCOUNTER
Received request for a refill(s) of oxyCODONE-acetaminophen (PERCOCET) 7.5-325 MG per tablet      Last dispensed from pharmacy on 07/23/24    Patient's last office/virtual visit by prescribing provider on 06/13/24  Next office/virtual appointment scheduled for None    Last urine drug screen date 03/08/24  Current opioid agreement on file (completed within the last year) Yes Date of opioid agreement: 03/08/24    E-prescribe to     [facility] Only #[id number] - [place], [place] - [address]  [address]  [address]  [facility] [address]  Phone: [phone] Fax: [phone]      Will route to nursing pool for review and preparation of prescription(s).       Caprice Lemus MA  [facility]

## 2024-07-25 NOTE — TELEPHONE ENCOUNTER
Prior Authorization Not Needed per Insurance    Medication: OXYCODONE-ACETAMINOPHEN 7.5-325 MG PO TABS  Insurance Company: [employer] Part D - Phone [phone] Fax [phone]  Expected CoPay: $    Pharmacy Filling the Rx: [id number] [place] [place] - [address]  Pharmacy Notified: YES  Patient Notified: Instructed pharmacy to notify patient once order is ready.     Per CMM no pa needed - per PC with [facility] call ref #: [id number] - called to make sure this wasn't a quantity limitation issue. Per rep patient is allowed to get up to 360 tablets in 30 days. Pt only getting 150 tabs/30 days.   Rep informed me that next available fill date is 7/29/2024 - this was [facility].     Called pharmacy to let them know and they also put a note in patient profile for future fills of med.

## 2024-08-01 NOTE — TELEPHONE ENCOUNTER
When Osvaldo needs refills, let's set up three 10 day refills with the same request so we aren't dealing with this every 10 days. Thanks, DV

## 2024-08-04 ENCOUNTER — HOSPITAL ENCOUNTER (OUTPATIENT)
Dept: MRI IMAGING | Facility: HOSPITAL | Age: 84
Discharge: HOME | End: 2024-08-04
Attending: PSYCHIATRY & NEUROLOGY | Admitting: PSYCHIATRY & NEUROLOGY
Payer: MEDICARE

## 2024-08-04 DIAGNOSIS — R41.89 COGNITIVE DECLINE: ICD-10-CM

## 2024-08-04 DIAGNOSIS — R41.89 SUBJECTIVE MEMORY COMPLAINTS: ICD-10-CM

## 2024-08-04 PROCEDURE — 70551 MRI BRAIN STEM W/O DYE: CPT | Mod: MG

## 2024-08-14 ENCOUNTER — OFFICE VISIT (OUTPATIENT)
Dept: PHYSICAL MEDICINE AND REHAB | Facility: CLINIC | Age: 84
End: 2024-08-14
Payer: MEDICARE

## 2024-08-14 VITALS — HEART RATE: 51 BPM | SYSTOLIC BLOOD PRESSURE: 129 MMHG | DIASTOLIC BLOOD PRESSURE: 58 MMHG

## 2024-08-14 DIAGNOSIS — M54.6 CHRONIC BILATERAL THORACIC BACK PAIN: ICD-10-CM

## 2024-08-14 DIAGNOSIS — M19.012 PRIMARY OSTEOARTHRITIS OF LEFT SHOULDER: Primary | ICD-10-CM

## 2024-08-14 DIAGNOSIS — M54.12 CHRONIC RADICULAR CERVICAL PAIN: ICD-10-CM

## 2024-08-14 DIAGNOSIS — M25.512 CHRONIC LEFT SHOULDER PAIN: ICD-10-CM

## 2024-08-14 DIAGNOSIS — G89.29 CHRONIC BILATERAL THORACIC BACK PAIN: ICD-10-CM

## 2024-08-14 DIAGNOSIS — G62.9 PERIPHERAL POLYNEUROPATHY: ICD-10-CM

## 2024-08-14 DIAGNOSIS — G89.29 CHRONIC LEFT SHOULDER PAIN: ICD-10-CM

## 2024-08-14 DIAGNOSIS — G89.29 CHRONIC BILATERAL LOW BACK PAIN, UNSPECIFIED WHETHER SCIATICA PRESENT: ICD-10-CM

## 2024-08-14 DIAGNOSIS — M54.50 CHRONIC BILATERAL LOW BACK PAIN, UNSPECIFIED WHETHER SCIATICA PRESENT: ICD-10-CM

## 2024-08-14 DIAGNOSIS — G89.29 CHRONIC INTRACTABLE PAIN: ICD-10-CM

## 2024-08-14 DIAGNOSIS — E78.5 HYPERLIPIDEMIA LDL GOAL <100: ICD-10-CM

## 2024-08-14 DIAGNOSIS — E03.4 HYPOTHYROIDISM DUE TO ACQUIRED ATROPHY OF THYROID: ICD-10-CM

## 2024-08-14 DIAGNOSIS — G89.29 CHRONIC RADICULAR CERVICAL PAIN: ICD-10-CM

## 2024-08-14 DIAGNOSIS — F33.41 RECURRENT MAJOR DEPRESSIVE DISORDER, IN PARTIAL REMISSION (H): ICD-10-CM

## 2024-08-14 DIAGNOSIS — M15.3 POST-TRAUMATIC OSTEOARTHRITIS OF MULTIPLE JOINTS: ICD-10-CM

## 2024-08-14 PROCEDURE — 99204 OFFICE O/P NEW MOD 45 MIN: CPT | Performed by: PAIN MEDICINE

## 2024-08-14 ASSESSMENT — PAIN SCALES - GENERAL: PAINLEVEL: MODERATE PAIN (5)

## 2024-08-14 NOTE — TELEPHONE ENCOUNTER
Received request for a refill(s) of fentaNYL (DURAGESIC) 12 mcg/hr 72 hr patch      Last dispensed from pharmacy on 07/21/24    Patient's last office/virtual visit by prescribing provider on 06/13/24  Next office/virtual appointment scheduled for:None    Last urine drug screen date 03/08/24  Current opioid agreement on file (completed within the last year) Yes Date of opioid agreement: 03/08/24    E-prescribe to pharmacy[id number] [address]      Will route to nursing [place] for review and preparation of prescription(s).

## 2024-08-14 NOTE — PATIENT INSTRUCTIONS
I have ordered left shoulder blocks for you.    [facility] Injection Requirements    A  is required for all fluoroscopically-guided injections.  Injection appointments may be cancelled if there are signs/symptoms of an active infection or if the patient is being actively treated with antibiotics for a diagnosed infection.  Patients may have their steroid injection cancelled if they have had another steroid injection within 2 weeks.  Diabetic patients will have their blood glucose levels checked the day of their injection and the appointment will be rescheduled if the blood glucose level is 300 or higher.  Patients with allergies to cortisone, local anesthetics, iodine, or contrast dye should contact the [facility] Center to further discuss these considerations.  Patients scheduled for medial branch block diagnostic injections should refrain from taking pain medication the day of the procedure.  The medial branch block injection appointment will be rescheduled if the patient's pain rating is not 5/10 or greater at the time of the procedure.  Patients taking warfarin/Coumadin will have their INR checked the day of the procedure and the procedure may be rescheduled if the INR is greater than 3.0.  Please contact the [facility] Center (#[phone]) if you are taking any prescription blood-thinning medications (aspirin, warfarin, Plavix, Lovenox, Eliquis, Brilinta, Effient, etc.) as special dosing adjustments may need to be made depending on the type of injection you are scheduled to receive.  It is recommended that you delay having your steroid injection if you have received any vaccines within 2 weeks.    ~Please call Nurse Navigation line [phone] with any questions or concerns about your treatment plan, if symptoms worsen and you would like to be seen urgently, or if you have problems controlling bladder and bowel function.

## 2024-08-14 NOTE — PROGRESS NOTES
ASSESSMENT: Sina Gomez is a 83 year old male presents for consultation at the [facility] of [facility] primary care providerBrionna White, with past medical history significant for Alzheimer's disease, coronary artery disease, chronic bilateral thoracic back pain, abdominal pain, obstructive sleep apnea, nausea, vomiting and diarrhea, colitis, hypothyroidism, hyperlipidemia, status post TAVR, hypertension, primary osteoarthritis of shoulder, sepsis, prostate cancer, anemia, depression, chronic continuous use of opioids, anxiety, CVA, bacteremia who presents today for new patient evaluation of chronic left shoulder pain:     -Patient has severe left shoulder pain likely secondary to left shoulder osteoarthritis.    Patient is neurologically intact on exam. No myelopathic or red flag symptoms.      Oswestry (SHARA) Questionnaire        8/9/2024     9:15 AM   OSWESTRY DISABILITY INDEX   Count 8   Sum 27   Oswestry Score (%) 67.5 %       Neck Disability Index:      8/9/2024     9:16 AM   Neck Disability Index (  Alfredo H. and Tarun C. 1991. All rights reserved.; used with permission)   SECTION 1 - PAIN INTENSITY 2   Count 1   Sum 2       Diagnoses and all orders for this visit:  Primary osteoarthritis of left shoulder  -     PAIN Shoulder Block Left; Future  Chronic left shoulder pain  -     PAIN Shoulder Block Left; Future     PLAN:  Reviewed spine anatomy and disease process. Discussed diagnosis and treatment options with the patient today. A shared decision making model was used. The patient's values and choices were respected. The following represents what was discussed and decided upon by the provider and the patient.     -DIAGNOSTIC TESTS:    -- X-ray report is reviewed and shows severe left glenohumeral joint arthritis.    -PHYSICAL THERAPY: Recommended to continue with home exercises on a consistent basis.    -MEDICATIONS: No changes to medications.  -  Discussed multiple medication options today  with patient. Discussed risks, side effects, and proper use of medications. Patient verbalized understanding.    -INTERVENTIONS: I ordered left sensory nerve shoulder blocks with an eye to radiofrequency ablation.  Could also consider peripheral nerve stimulator in the future.  Discussed risks and benefits of injections with patient today.    -PATIENT EDUCATION: We discussed pain management in a multiple to fashion including physical therapy, medication management, possible future injections.    -FOLLOW-UP:   Patient will follow-up after injections.    Advised patient to call [facility] if symptoms worsen or you have problems controlling bladder and bowel function.   ______________________________________________________________________    SUBJECTIVE:   Sina Gomez  is a 83 year old male who presents today for new patient evaluation of chronic left shoulder pain.  Patient was seen by Anne Herman on 6/13/2024 with chronic left shoulder pain.  He had been seen at [facility] in the summer 2023 and had cortisone injections without relief.  Patient is here with his son who is very helpful the planning process.  Patient notes that he has had cortisone injections with about 6 weeks of relief at times, however the cortisone injections have been less effective.  He had physical therapy in the past without relief.  This time he is very limited to use of his left arm secondary to shoulder pain.  His pain today is 5/10 at its worst is 10/10 as best as 5/10.  Pain is into the left anterior and lateral shoulder.  Pain is worse with lifting his arm.  He denies any bowel or bladder changes, fevers, chills, unintentional weight loss.    -Treatment to Date: Left shoulder injections.  Physical therapy.    -Medications:    Current Outpatient Medications   Medication Sig Dispense Refill    acetaminophen (TYLENOL) 500 MG tablet Take 1-2 tablets (500-1,000 mg) by mouth every 8 hours as needed for mild pain 30 tablet 0     aspirin (ASA) 81 MG chewable tablet Take 1 tablet (81 mg) by mouth daily 90 tablet 1    atorvastatin (LIPITOR) 40 MG tablet TAKE 1 AND 1/2 TABS (60MG) BY MOUTH AT BEDTIME 135 tablet 3    busPIRone (BUSPAR) 15 MG tablet TAKE 1 TAB BY MOUTH EVERY 8HOURS 90 tablet 11    clopidogrel (PLAVIX) 75 MG tablet TAKE 1 TABLET BY MOUTH ONCE DAILY 90 tablet 3    donepezil (ARICEPT) 10 MG tablet TAKE 1 TABLET (10 MG) BY MOUTH AT BEDTIME 90 tablet 1    fentaNYL (DURAGESIC) 12 mcg/hr 72 hr patch Place 1 patch onto the skin every 48 hours Ok to fill 07/20/24 to begin using 07/22/24 30 days supply for chronic pain. 15 patch 0    furosemide (LASIX) 20 MG tablet Take 1 tablet by mouth daily at 2 pm      gabapentin (NEURONTIN) 300 MG capsule TAKE 1 CAPSULE BY MOUTH THREE TIMES DAILY 90 capsule 5    isosorbide mononitrate (IMDUR) 30 MG 24 hr tablet       isosorbide mononitrate (IMDUR) 30 MG 24 hr tablet Take 1 tablet (30 mg) by mouth daily 90 tablet 3    levothyroxine (SYNTHROID/LEVOTHROID) 50 MCG tablet TAKE 1 TABLET (50 MCG) BY MOUTH DAILY 30 tablet 0    memantine (NAMENDA) 5 MG tablet Take 1 tablet (5 mg) by mouth 2 times daily 5 mg daily for week 1. Then increase by 5 mg every week till on 10 mg twice a day- as tolerated. 360 tablet 3    naloxone (NARCAN) 4 MG/0.1ML nasal spray Spray 1 spray (4 mg) into one nostril alternating nostrils as needed for opioid reversal every 2-3 minutes until assistance arrives 0.2 mL 1    nitroGLYcerin (NITROSTAT) 0.4 MG sublingual tablet PLACE 1 TAB UNDER TONGUE EVERY 5min AS NEEDED FOR CHEST PAIN MAX 3 TABS/EPISODE 25 tablet 11    nystatin (MYCOSTATIN) 270318 UNIT/GM external powder Apply topically 2 times daily To groin area.  Okay to self administer      OLANZapine (ZYPREXA) 2.5 MG tablet Take 1 tablet (2.5 mg) by mouth at bedtime 90 tablet 1    olmesartan (BENICAR) 20 MG tablet TAKE 1 TAB BY MOUTH ONCE DAILY 90 tablet 0    oxyCODONE-acetaminophen (PERCOCET) 7.5-325 MG per tablet Take 1 tablet by  mouth 4 times daily Scheduled. 1 tab at 8 am, 1 pm, 5 pm, and 9 pm. May also take 1 tablet by mouth daily as needed for severe pain Between 12 AM and 4 AM. Ok to fill/start using on 07/24/24. 150 tablet 0    polyethylene glycol (MIRALAX) 17 GM/Dose powder Take as directed for colonoscopy prep 255 g 0    sertraline (ZOLOFT) 100 MG tablet TAKE 1 TABLET BY MOUTH DAILY 90 tablet 3    traZODone (DESYREL) 100 MG tablet TAKE 1 TABLET BY MOUTH AT BEDTIME 90 tablet 1     No current facility-administered medications for this visit.       Allergies   Allergen Reactions    Penicillins Difficulty breathing       Past Medical History:   Diagnosis Date    Cerebral artery occlusion with cerebral infarction (H)     Coronary artery disease     Depression     Sepsis (H) 06/14/2020    UTI with septic hock    Sleep apnea     TBI (traumatic brain injury) (H) 1968        Patient Active Problem List   Diagnosis    Malignant tumor of prostate (H)    Chronic, continuous use of opioids    Hypothyroidism due to acquired atrophy of thyroid    KALIA (obstructive sleep apnea)- severe (AHI 55)    S/P TAVR (transcatheter aortic valve replacement)    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)    Localized, primary osteoarthritis of shoulder region    Chronic bilateral thoracic back pain    Abdominal pain, generalized    Acute febrile illness    Nausea vomiting and diarrhea    Anemia    History of CEA (carotid endarterectomy)    Colitis    Sepsis (H)    Alzheimer's disease (H)    Anxiety    Depression    Benign essential hypertension    Hyperlipidemia LDL goal <70    History of CVA (cerebrovascular accident)    Bacteremia       Past Surgical History:   Procedure Laterality Date    COLONOSCOPY N/A 7/20/2022    Procedure: COLONOSCOPY;  Surgeon: Dexter Schmitt MD;  [facility]    CV FEMORAL ANGIOGRAM      HC VALVE (TAVR)      left hip replacement Left 05/03/1995    REPLACEMENT TOTAL  KNEE Left        No family history on file.    Reviewed past medical, surgical, and family history with patient found on new patient intake packet located in [employer]MR [employer] tab.     SOCIAL HX: He denies smoking or drinking alcohol or using recreational drugs.  He used to be a heavy drinker.    ROS:  Specifically negative for bowel/bladder dysfunction, balance changes, headache, dizziness, foot drop, fevers, chills, appetite changes, nausea/vomiting, unexplained weight loss. Otherwise 13 systems reviewed are negative. Please see the patient's intake questionnaire from today for details.    OBJECTIVE:  /58   Pulse 51     PHYSICAL EXAMINATION:  --CONSTITUTIONAL: Vital signs as above. No acute distress. The patient is well nourished and well groomed.  --PSYCHIATRIC: The patient is awake, alert, oriented to person, place, time and answering questions appropriately with clear speech. Appropriate mood and affect   --HEENT: Sclera are non-injected.   --SKIN: Skin over the face is clean, dry, intact without rashes.  --RESPIRATORY: Normal rhythm and effort. No abnormal accessory muscle breathing patterns noted.   --GROSS MOTOR: Easily arises from a seated position. Toe walking and heel walking are normal.    --CERVICAL SPINE: Inspection reveals no evidence of deformity. Range of motion is mildly limited in cervical flexion, extension, lateral rotation. No tenderness to palpation cervical spine.  Spurling maneuver negative bilaterally.  --SHOULDERS: Full range of motion in right shoulder abduction.  He is able to lift his left shoulder to about 15 degrees before having severe pain.  --UPPER EXTREMITY MOTOR TESTING: Strength testing on the left is somewhat difficult secondary to pain but does appear to have full strength outside of the shoulder abduction on the left.  Wrist flexion left 5/5, right 5/5  Wrist extension left 5/5, right 5/5  Pronators left 5/5, right 5/5  Biceps left 5/5, right 5/5   Triceps left 5/5, right 5/5    Shoulder abduction left 4/5, right 5/5   left 5/5, right 5/5  --NEUROLOGIC: Sensation to upper extremities is normal.    --VASCULAR: 2/4 radial pulses bilaterally.     RESULTS: Prior medical records from [facility] pain management were reviewed today.        MR Brain w/o Contrast    Result Date: 8/5/2024  EXAM: MR BRAIN W/O CONTRAST LOCATION: [facility] DATE: 8/4/2024 INDICATION: Cognitive decline. COMPARISON: MRI brain 04/23/2022. TECHNIQUE: Routine multiplanar multisequence head MRI without intravenous contrast. FINDINGS: INTRACRANIAL CONTENTS: There are no areas of abnormally restricted diffusion to suggest acute or subacute infarct. There is linear susceptibility along the anterior surface of the right frontal lobe compatible with hemosiderin staining, stable compared to prior. Stable small focus of susceptibility again seen within the left cerebral peduncle compatible with an area of mineralization or hemosiderin staining. Mild to moderate prominence lateral and third ventricles. Patchy FLAIR hyperintensity is seen within the cerebral white matter, nonspecific but compatible with areas of gliosis and/or chronic myelin loss. Small chronic infarcts are again seen within the right parietal lobe, right frontal deep white matter, and inferolateral right cerebellum. Cerebellar tonsils are normally positioned. Visualized upper cervical spinal cord, sella, and corpus callosum are unremarkable. Small amount of degenerative pannus is seen along the dorsum of the dens. Foramen magnum remains widely patent. Major skull base vascular flow-voids are preserved. OSSEOUS STRUCTURES/SOFT TISSUES: Visualized marrow space is unremarkable. ORBITS: No abnormality accounting for technique. SINUSES/MASTOIDS: No paranasal sinus mucosal disease. No middle ear or mastoid effusion.     IMPRESSION: 1.  No finding for acute infarct, acute hemorrhage, or mass. 2.  Stable hemosiderin staining is seen  along the anterior surface the right frontal lobe. Stable small focus of mineralization or hemosiderin staining left cerebral peduncle. 3.  Mild to moderate volume loss and presumed sequela of chronic microvascular ischemic change, similar to prior. 4.  Small chronic infarcts are again seen within the right frontal and parietal lobes and inferior lateral right cerebellum.

## 2024-08-14 NOTE — LETTER
8/14/2024      Sina Gomez  [address]  [facility] [address]      Dear Colleague,    Thank you for referring your patient, Sina Gomez, to the [facility]. Please see a copy of my visit note below.    ASSESSMENT: Sina Gomez is a 83 year old male presents for consultation at the request of [facility] primary care providerBrionna White, with past medical history significant for Alzheimer's disease, coronary artery disease, chronic bilateral thoracic back pain, abdominal pain, obstructive sleep apnea, nausea, vomiting and diarrhea, colitis, hypothyroidism, hyperlipidemia, status post TAVR, hypertension, primary osteoarthritis of shoulder, sepsis, prostate cancer, anemia, depression, chronic continuous use of opioids, anxiety, CVA, bacteremia who presents today for new patient evaluation of chronic left shoulder pain:     -Patient has severe left shoulder pain likely secondary to left shoulder osteoarthritis.    Patient is neurologically intact on exam. No myelopathic or red flag symptoms.      Oswestry (SHARA) Questionnaire        8/9/2024     9:15 AM   OSWESTRY DISABILITY INDEX   Count 8   Sum 27   Oswestry Score (%) 67.5 %       Neck Disability Index:      8/9/2024     9:16 AM   Neck Disability Index (  Alfredo H. and Tarun C. 1991. All rights reserved.; used with permission)   SECTION 1 - PAIN INTENSITY 2   Count 1   Sum 2       Diagnoses and all orders for this visit:  Primary osteoarthritis of left shoulder  -     PAIN Shoulder Block Left; Future  Chronic left shoulder pain  -     PAIN Shoulder Block Left; Future     PLAN:  Reviewed spine anatomy and disease process. Discussed diagnosis and treatment options with the patient today. A shared decision making model was used. The patient's values and choices were respected. The following represents what was discussed and decided upon by the provider and the patient.     -DIAGNOSTIC TESTS:    -- X-ray  report is reviewed and shows severe left glenohumeral joint arthritis.    -PHYSICAL THERAPY: Recommended to continue with home exercises on a consistent basis.    -MEDICATIONS: No changes to medications.  -  Discussed multiple medication options today with patient. Discussed risks, side effects, and proper use of medications. Patient verbalized understanding.    -INTERVENTIONS: I ordered left sensory nerve shoulder blocks with an eye to radiofrequency ablation.  Could also consider peripheral nerve stimulator in the future.  Discussed risks and benefits of injections with patient today.    -PATIENT EDUCATION: We discussed pain management in a multiple to fashion including physical therapy, medication management, possible future injections.    -FOLLOW-UP:   Patient will follow-up after injections.    Advised patient to call the Spine Center if symptoms worsen or you have problems controlling bladder and bowel function.   ______________________________________________________________________    SUBJECTIVE:   Sina Gomez  is a 83 year old male who presents today for new patient evaluation of chronic left shoulder pain.  Patient was seen by Anne Herman on 6/13/2024 with chronic left shoulder pain.  He had been seen at [facility] in the summer 2023 and had cortisone injections without relief.  Patient is here with his son who is very helpful the planning process.  Patient notes that he has had cortisone injections with about 6 weeks of relief at times, however the cortisone injections have been less effective.  He had physical therapy in the past without relief.  This time he is very limited to use of his left arm secondary to shoulder pain.  His pain today is 5/10 at its worst is 10/10 as best as 5/10.  Pain is into the left anterior and lateral shoulder.  Pain is worse with lifting his arm.  He denies any bowel or bladder changes, fevers, chills, unintentional weight loss.    -Treatment to Date: Left  shoulder injections.  Physical therapy.    -Medications:    Current Outpatient Medications   Medication Sig Dispense Refill     acetaminophen (TYLENOL) 500 MG tablet Take 1-2 tablets (500-1,000 mg) by mouth every 8 hours as needed for mild pain 30 tablet 0     aspirin (ASA) 81 MG chewable tablet Take 1 tablet (81 mg) by mouth daily 90 tablet 1     atorvastatin (LIPITOR) 40 MG tablet TAKE 1 AND 1/2 TABS (60MG) BY MOUTH AT BEDTIME 135 tablet 3     busPIRone (BUSPAR) 15 MG tablet TAKE 1 TAB BY MOUTH EVERY 8HOURS 90 tablet 11     clopidogrel (PLAVIX) 75 MG tablet TAKE 1 TABLET BY MOUTH ONCE DAILY 90 tablet 3     donepezil (ARICEPT) 10 MG tablet TAKE 1 TABLET (10 MG) BY MOUTH AT BEDTIME 90 tablet 1     fentaNYL (DURAGESIC) 12 mcg/hr 72 hr patch Place 1 patch onto the skin every 48 hours Ok to fill 07/20/24 to begin using 07/22/24 30 days supply for chronic pain. 15 patch 0     furosemide (LASIX) 20 MG tablet Take 1 tablet by mouth daily at 2 pm       gabapentin (NEURONTIN) 300 MG capsule TAKE 1 CAPSULE BY MOUTH THREE TIMES DAILY 90 capsule 5     isosorbide mononitrate (IMDUR) 30 MG 24 hr tablet        isosorbide mononitrate (IMDUR) 30 MG 24 hr tablet Take 1 tablet (30 mg) by mouth daily 90 tablet 3     levothyroxine (SYNTHROID/LEVOTHROID) 50 MCG tablet TAKE 1 TABLET (50 MCG) BY MOUTH DAILY 30 tablet 0     memantine (NAMENDA) 5 MG tablet Take 1 tablet (5 mg) by mouth 2 times daily 5 mg daily for week 1. Then increase by 5 mg every week till on 10 mg twice a day- as tolerated. 360 tablet 3     naloxone (NARCAN) 4 MG/0.1ML nasal spray Spray 1 spray (4 mg) into one nostril alternating nostrils as needed for opioid reversal every 2-3 minutes until assistance arrives 0.2 mL 1     nitroGLYcerin (NITROSTAT) 0.4 MG sublingual tablet PLACE 1 TAB UNDER TONGUE EVERY 5min AS NEEDED FOR CHEST PAIN MAX 3 TABS/EPISODE 25 tablet 11     nystatin (MYCOSTATIN) 549410 UNIT/GM external powder Apply topically 2 times daily To groin area.  Okay  to self administer       OLANZapine (ZYPREXA) 2.5 MG tablet Take 1 tablet (2.5 mg) by mouth at bedtime 90 tablet 1     olmesartan (BENICAR) 20 MG tablet TAKE 1 TAB BY MOUTH ONCE DAILY 90 tablet 0     oxyCODONE-acetaminophen (PERCOCET) 7.5-325 MG per tablet Take 1 tablet by mouth 4 times daily Scheduled. 1 tab at 8 am, 1 pm, 5 pm, and 9 pm. May also take 1 tablet by mouth daily as needed for severe pain Between 12 AM and 4 AM. Ok to fill/start using on 07/24/24. 150 tablet 0     polyethylene glycol (MIRALAX) 17 GM/Dose powder Take as directed for colonoscopy prep 255 g 0     sertraline (ZOLOFT) 100 MG tablet TAKE 1 TABLET BY MOUTH DAILY 90 tablet 3     traZODone (DESYREL) 100 MG tablet TAKE 1 TABLET BY MOUTH AT BEDTIME 90 tablet 1     No current facility-administered medications for this visit.       Allergies   Allergen Reactions     Penicillins Difficulty breathing       Past Medical History:   Diagnosis Date     Cerebral artery occlusion with cerebral infarction (H)      Coronary artery disease      Depression      Sepsis (H) 06/14/2020    UTI with septic hock     Sleep apnea      TBI (traumatic brain injury) (H) 1968        Patient Active Problem List   Diagnosis     Malignant tumor of prostate (H)     Chronic, continuous use of opioids     Hypothyroidism due to acquired atrophy of thyroid     KALIA (obstructive sleep apnea)- severe (AHI 55)     S/P TAVR (transcatheter aortic valve replacement)     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)     Localized, primary osteoarthritis of shoulder region     Chronic bilateral thoracic back pain     Abdominal pain, generalized     Acute febrile illness     Nausea vomiting and diarrhea     Anemia     History of CEA (carotid endarterectomy)     Colitis     Sepsis (H)     Alzheimer's disease (H)     Anxiety     Depression     Benign essential hypertension     Hyperlipidemia LDL goal <70     History of CVA (cerebrovascular  accident)     Bacteremia       Past Surgical History:   Procedure Laterality Date     COLONOSCOPY N/A 7/20/2022    Procedure: COLONOSCOPY;  Surgeon: Dexter Schmitt MD;  Location: [facility] [place] [facility]     CV FEMORAL ANGIOGRAM       HC VALVE (TAVR)       left hip replacement Left 05/03/1995     REPLACEMENT TOTAL KNEE Left        No family history on file.    Reviewed past medical, surgical, and family history with patient found on new patient intake packet located in EMR Media tab.     SOCIAL HX: He denies smoking or drinking alcohol or using recreational drugs.  He used to be a heavy drinker.    ROS:  Specifically negative for bowel/bladder dysfunction, balance changes, headache, dizziness, foot drop, fevers, chills, appetite changes, nausea/vomiting, unexplained weight loss. Otherwise 13 systems reviewed are negative. Please see the patient's intake questionnaire from today for details.    OBJECTIVE:  /58   Pulse 51     PHYSICAL EXAMINATION:  --CONSTITUTIONAL: Vital signs as above. No acute distress. The patient is well nourished and well groomed.  --PSYCHIATRIC: The patient is awake, alert, oriented to person, place, time and answering questions appropriately with clear speech. Appropriate mood and affect   --HEENT: Sclera are non-injected.   --SKIN: Skin over the face is clean, dry, intact without rashes.  --RESPIRATORY: Normal rhythm and effort. No abnormal accessory muscle breathing patterns noted.   --GROSS MOTOR: Easily arises from a seated position. Toe walking and heel walking are normal.    --CERVICAL SPINE: Inspection reveals no evidence of deformity. Range of motion is mildly limited in cervical flexion, extension, lateral rotation. No tenderness to palpation cervical spine.  Spurling maneuver negative bilaterally.  --SHOULDERS: Full range of motion in right shoulder abduction.  He is able to lift his left shoulder to about 15 degrees before having severe pain.  --UPPER EXTREMITY MOTOR  TESTING: Strength testing on the left is somewhat difficult secondary to pain but does appear to have full strength outside of the shoulder abduction on the left.  Wrist flexion left 5/5, right 5/5  Wrist extension left 5/5, right 5/5  Pronators left 5/5, right 5/5  Biceps left 5/5, right 5/5   Triceps left 5/5, right 5/5   Shoulder abduction left 4/5, right 5/5   left 5/5, right 5/5  --NEUROLOGIC: Sensation to upper extremities is normal.    --VASCULAR: 2/4 radial pulses bilaterally.     RESULTS: Prior medical records from [facility] pain management were reviewed today.        MR Brain w/o Contrast    Result Date: 8/5/2024  EXAM: MR BRAIN W/O CONTRAST LOCATION: [facility] DATE: 8/4/2024 INDICATION: Cognitive decline. COMPARISON: MRI brain 04/23/2022. TECHNIQUE: Routine multiplanar multisequence head MRI without intravenous contrast. FINDINGS: INTRACRANIAL CONTENTS: There are no areas of abnormally restricted diffusion to suggest acute or subacute infarct. There is linear susceptibility along the anterior surface of the right frontal lobe compatible with hemosiderin staining, stable compared to prior. Stable small focus of susceptibility again seen within the left cerebral peduncle compatible with an area of mineralization or hemosiderin staining. Mild to moderate prominence lateral and third ventricles. Patchy FLAIR hyperintensity is seen within the cerebral white matter, nonspecific but compatible with areas of gliosis and/or chronic myelin loss. Small chronic infarcts are again seen within the right parietal lobe, right frontal deep white matter, and inferolateral right cerebellum. Cerebellar tonsils are normally positioned. Visualized upper cervical spinal cord, sella, and corpus callosum are unremarkable. Small amount of degenerative pannus is seen along the dorsum of the dens. Foramen magnum remains widely patent. Major skull base vascular flow-voids are preserved. OSSEOUS  STRUCTURES/SOFT TISSUES: Visualized marrow space is unremarkable. ORBITS: No abnormality accounting for technique. SINUSES/MASTOIDS: No paranasal sinus mucosal disease. No middle ear or mastoid effusion.     IMPRESSION: 1.  No finding for acute infarct, acute hemorrhage, or mass. 2.  Stable hemosiderin staining is seen along the anterior surface the right frontal lobe. Stable small focus of mineralization or hemosiderin staining left cerebral peduncle. 3.  Mild to moderate volume loss and presumed sequela of chronic microvascular ischemic change, similar to prior. 4.  Small chronic infarcts are again seen within the right frontal and parietal lobes and inferior lateral right cerebellum.         Again, thank you for allowing me to participate in the care of your patient.        Sincerely,        Bala Morgan, DO

## 2024-08-15 RX ORDER — SERTRALINE HYDROCHLORIDE 100 MG/1
100 TABLET, FILM COATED ORAL DAILY
Qty: 90 TABLET | Refills: 3 | Status: SHIPPED | OUTPATIENT
Start: 2024-08-15

## 2024-08-15 RX ORDER — GABAPENTIN 300 MG/1
CAPSULE ORAL
Qty: 90 CAPSULE | Refills: 5 | Status: SHIPPED | OUTPATIENT
Start: 2024-08-15

## 2024-08-15 RX ORDER — FUROSEMIDE 20 MG
20 TABLET ORAL DAILY
Qty: 30 TABLET | Refills: 11 | OUTPATIENT
Start: 2024-08-15

## 2024-08-15 RX ORDER — ATORVASTATIN CALCIUM 40 MG/1
TABLET, FILM COATED ORAL
Qty: 135 TABLET | Refills: 3 | Status: SHIPPED | OUTPATIENT
Start: 2024-08-15

## 2024-08-15 RX ORDER — FENTANYL 12.5 UG/1
1 PATCH TRANSDERMAL
Qty: 15 PATCH | Refills: 0 | Status: SHIPPED | OUTPATIENT
Start: 2024-08-15 | End: 2024-09-16

## 2024-08-15 RX ORDER — BUSPIRONE HYDROCHLORIDE 15 MG/1
TABLET ORAL
Qty: 90 TABLET | Refills: 11 | Status: SHIPPED | OUTPATIENT
Start: 2024-08-15

## 2024-08-15 RX ORDER — LEVOTHYROXINE SODIUM 50 UG/1
50 TABLET ORAL DAILY
Qty: 30 TABLET | Refills: 0 | Status: SHIPPED | OUTPATIENT
Start: 2024-08-15 | End: 2024-08-29

## 2024-08-15 RX ORDER — TRAZODONE HYDROCHLORIDE 100 MG/1
TABLET ORAL
Qty: 90 TABLET | Refills: 1 | Status: SHIPPED | OUTPATIENT
Start: 2024-08-15

## 2024-08-15 NOTE — TELEPHONE ENCOUNTER
Script Eprescribed to pharmacy    Signed Prescriptions:                        Disp   Refills    fentaNYL (DURAGESIC) 12 mcg/hr 72 hr patch 15 pat*0        Sig: Place 1 patch onto the skin every 48 hours Ok to fill           08/20/24 to begin using 08/21/24 30 days supply           for chronic pain.  Authorizing Provider: LUCIAN HOPPER      Thank you for using My Chart to request your medication refills!     ALPESH Alamo, NP-C  [facility] [place]

## 2024-08-15 NOTE — TELEPHONE ENCOUNTER
Medication refill information reviewed.     Due date for fentaNYL (DURAGESIC) 12 mcg/hr 72 hr patch  is 08/21/24     Prescriptions prepped for review.     Will route to provider.

## 2024-08-22 DIAGNOSIS — I10 BENIGN ESSENTIAL HYPERTENSION: Primary | Chronic | ICD-10-CM

## 2024-08-22 NOTE — TELEPHONE ENCOUNTER
Pending Prescriptions:                       Disp   Refills    furosemide (LASIX) 20 MG tablet [Pharmacy*30 tab*11           Sig: TAKE 1 TABLET BY MOUTH DAILY    Routing refill request to provider for review/approval because:  Medication is reported/historical      Wolfgang Flaherty RN

## 2024-08-24 RX ORDER — FUROSEMIDE 20 MG
20 TABLET ORAL DAILY
Qty: 30 TABLET | Refills: 11 | Status: SHIPPED | OUTPATIENT
Start: 2024-08-24

## 2024-08-29 DIAGNOSIS — E03.4 HYPOTHYROIDISM DUE TO ACQUIRED ATROPHY OF THYROID: ICD-10-CM

## 2024-08-29 DIAGNOSIS — I25.718 CORONARY ARTERY DISEASE OF AUTOLOGOUS VEIN BYPASS GRAFT WITH STABLE ANGINA PECTORIS (H): ICD-10-CM

## 2024-08-29 RX ORDER — OLMESARTAN MEDOXOMIL 20 MG/1
TABLET ORAL
Qty: 90 TABLET | Refills: 0 | Status: SHIPPED | OUTPATIENT
Start: 2024-08-29

## 2024-08-29 RX ORDER — LEVOTHYROXINE SODIUM 50 UG/1
50 TABLET ORAL DAILY
Qty: 30 TABLET | Refills: 0 | Status: SHIPPED | OUTPATIENT
Start: 2024-08-29

## 2024-08-29 NOTE — TELEPHONE ENCOUNTER
Pending Prescriptions:                       Disp   Refills    levothyroxine (SYNTHROID/LEVOTHROID) 50 M*30 tab*0            Sig: TAKE 1 TABLET (50 MCG) BY MOUTH DAILY    olmesartan (BENICAR) 20 MG tablet [Pharma*90 tab*0            Sig: TAKE 1 TAB BY MOUTH ONCE DAILY    Routing refill request to provider for review/approval because:   Recent (12 mo) or future (30 days) visit within the authorizing provider's specialty    Normal TSH on file in past 12 months         Wolfgang Flaherty RN

## 2024-09-03 ENCOUNTER — RADIOLOGY INJECTION OFFICE VISIT (OUTPATIENT)
Dept: PHYSICAL MEDICINE AND REHAB | Facility: CLINIC | Age: 84
End: 2024-09-03
Attending: PAIN MEDICINE
Payer: MEDICARE

## 2024-09-03 VITALS
TEMPERATURE: 98.6 F | HEART RATE: 54 BPM | OXYGEN SATURATION: 94 % | DIASTOLIC BLOOD PRESSURE: 62 MMHG | RESPIRATION RATE: 16 BRPM | SYSTOLIC BLOOD PRESSURE: 112 MMHG

## 2024-09-03 DIAGNOSIS — M19.012 PRIMARY OSTEOARTHRITIS OF LEFT SHOULDER: ICD-10-CM

## 2024-09-03 DIAGNOSIS — G89.29 CHRONIC LEFT SHOULDER PAIN: ICD-10-CM

## 2024-09-03 DIAGNOSIS — M25.512 CHRONIC LEFT SHOULDER PAIN: ICD-10-CM

## 2024-09-03 PROCEDURE — 64450 NJX AA&/STRD OTHER PN/BRANCH: CPT | Mod: LT | Performed by: PAIN MEDICINE

## 2024-09-03 PROCEDURE — 77002 NEEDLE LOCALIZATION BY XRAY: CPT | Performed by: PAIN MEDICINE

## 2024-09-03 PROCEDURE — 64417 NJX AA&/STRD AX NERVE IMG: CPT | Mod: LT | Performed by: PAIN MEDICINE

## 2024-09-03 PROCEDURE — 64418 NJX AA&/STRD SPRSCAP NRV: CPT | Mod: LT | Performed by: PAIN MEDICINE

## 2024-09-03 RX ORDER — BUPIVACAINE HYDROCHLORIDE 5 MG/ML
INJECTION, SOLUTION EPIDURAL; INTRACAUDAL
Status: COMPLETED | OUTPATIENT
Start: 2024-09-03 | End: 2024-09-03

## 2024-09-03 RX ORDER — LIDOCAINE HYDROCHLORIDE 10 MG/ML
INJECTION, SOLUTION EPIDURAL; INFILTRATION; INTRACAUDAL; PERINEURAL
Status: COMPLETED | OUTPATIENT
Start: 2024-09-03 | End: 2024-09-03

## 2024-09-03 RX ADMIN — LIDOCAINE HYDROCHLORIDE 3 ML: 10 INJECTION, SOLUTION EPIDURAL; INFILTRATION; INTRACAUDAL; PERINEURAL at 14:50

## 2024-09-03 RX ADMIN — BUPIVACAINE HYDROCHLORIDE 1.5 ML: 5 INJECTION, SOLUTION EPIDURAL; INTRACAUDAL at 14:58

## 2024-09-03 ASSESSMENT — PAIN SCALES - GENERAL
PAINLEVEL: WORST PAIN (10)
PAINLEVEL: WORST PAIN (10)

## 2024-09-03 NOTE — PATIENT INSTRUCTIONS
Please complete your pain diary and return the diary to the Spine Center at your earliest convenience.  The Spine Center will contact you to schedule your next appointment after your pain diary is reviewed by your provider.  Thank you.    DISCHARGE INSTRUCTIONS    During office hours (8:00 a.m.- 4:00 p.m.) questions or concerns may be answered  by calling Spine Center Navigation Nurses at  [phone].  Messages received after hours will be returned the following business day.      In the case of an emergency, please dial 911 or seek assistance at the nearest Emergency Room/Urgent Care facility.     All Patients:  You may experience an increase in your symptoms for the first 2 days, once the numbing medication wears off.    You may resume your regular medication, no pain medication until you have completed your diary    You may shower. No swimming, tub bath or hot tub for 24 hours    Continue your activities that can cause you pain to test the blocks.                         You should not drive for the next 1 to 3 hours (have someone drive you)           POSSIBLE PROCEDURE SIDE EFFECTS  -Call Spine Center if concerned-    Increased Pain  Increased numbness/tingling     Nausea/Vomiting  Bruising/bleeding at site (hematoma)             Swelling at site (edema) Headache  Difficulty walking  Infection        Fever greater than 100.5      Please complete your pain diary and return the diary to the Spine Center at your earliest convenience.  The Spine Center will contact you once your pain diary has been received and reviewed by your provider.  Thank you.

## 2024-09-04 ENCOUNTER — TELEPHONE (OUTPATIENT)
Dept: FAMILY MEDICINE | Facility: CLINIC | Age: 84
End: 2024-09-04
Payer: MEDICARE

## 2024-09-04 NOTE — TELEPHONE ENCOUNTER
You cannot make him wear his CPAP.  I have no suggestions.  If he chooses not to wear his CPAP it is likely he will decompensate over time.  This is also his choice.  If the family can convince him to do where this that is fine.  Unless he is in extreme distress and he is okay without it then he can go without it. Brionna White M.D.

## 2024-09-04 NOTE — TELEPHONE ENCOUNTER
"  General Call      Reason for Call:      What are your questions or concerns:  Sophia calling from [facility]. Stated pt has had a CPAP machine for awhile, but pt refuses to use it. Sophia said that pt had it all in a bag and gave it to staff today and told them to \"get rid of it\". They told pt they can't do that and that they need to contact his doctor about it to get recommendations. Sophia is wondering if Dr. White wants to discontinue his cpap order? Or if there is something else she would recommend? Sophia stated she also left a message for pt's son to see if he can help convince pt to use the cpap machine. Wondering is Dr. White has any suggestions or recommendations.        Okay to leave a detailed message?: Yes at Other phone number:  [phone]  [facility]   "

## 2024-09-05 DIAGNOSIS — M54.12 CHRONIC RADICULAR CERVICAL PAIN: ICD-10-CM

## 2024-09-05 DIAGNOSIS — G89.29 CHRONIC BILATERAL THORACIC BACK PAIN: ICD-10-CM

## 2024-09-05 DIAGNOSIS — G89.29 CHRONIC RADICULAR CERVICAL PAIN: ICD-10-CM

## 2024-09-05 DIAGNOSIS — G89.29 CHRONIC INTRACTABLE PAIN: ICD-10-CM

## 2024-09-05 DIAGNOSIS — G89.29 CHRONIC BILATERAL LOW BACK PAIN, UNSPECIFIED WHETHER SCIATICA PRESENT: ICD-10-CM

## 2024-09-05 DIAGNOSIS — M54.6 CHRONIC BILATERAL THORACIC BACK PAIN: ICD-10-CM

## 2024-09-05 DIAGNOSIS — M15.3 POST-TRAUMATIC OSTEOARTHRITIS OF MULTIPLE JOINTS: ICD-10-CM

## 2024-09-05 DIAGNOSIS — G62.9 PERIPHERAL POLYNEUROPATHY: ICD-10-CM

## 2024-09-05 DIAGNOSIS — M54.50 CHRONIC BILATERAL LOW BACK PAIN, UNSPECIFIED WHETHER SCIATICA PRESENT: ICD-10-CM

## 2024-09-05 RX ORDER — OXYCODONE AND ACETAMINOPHEN 7.5; 325 MG/1; MG/1
1 TABLET ORAL 4 TIMES DAILY
Qty: 70 TABLET | Refills: 0 | Status: SHIPPED | OUTPATIENT
Start: 2024-09-05

## 2024-09-05 NOTE — TELEPHONE ENCOUNTER
Received request for a refill(s) of oxyCODONE-acetaminophen (PERCOCET) 7.5-325 MG per tablet       Last dispensed from pharmacy on 08/232/24     Patient's last office/virtual visit by prescribing provider on 06/13/24  Next office/virtual appointment scheduled for None     Last urine drug screen date 03/08/24  Current opioid agreement on file (completed within the last year) Yes Date of opioid agreement: 03/08/24     E-prescribe to      [id number] - [place], [place] - [address]  [address]  [address]  [facility] [address]  Phone: [phone] Fax: [phone]

## 2024-09-05 NOTE — TELEPHONE ENCOUNTER
Patient of Anne Herman  *3 month follow-up needs scheduled    Pending Prescriptions:                       Disp   Refills    oxyCODONE-acetaminophen (PERCOCET) 7.5-32*150 ta*0            Sig: Take 1 tablet by mouth 4 times daily. Scheduled.           1 tab at 8 am, 1 pm, 5 pm, and 9 pm. May also           take 1 tablet by mouth daily as needed for severe           pain Between 12 AM and 4 AM. Ok to fill/start           using on 09/05/24.    [id number] - [facility] [address]

## 2024-09-05 NOTE — TELEPHONE ENCOUNTER
9/5/2024 4:33 PM     REFILL REQUEST:  I am signing this on behalf of my colleague ALPESH Tucker, CNP  who is out of the office. Please see their previous documentation regarding the appropriateness of these prescriptions. Chart reviewed - Request appears appropriate. PDMP reviewed for all controlled substance refills. If any concerns are found, it will be noted.     Refilled for 14 day supply.  Script e-Prescribed to pharmacy    ALPESH Javier, DNP, FNP-C

## 2024-09-05 NOTE — TELEPHONE ENCOUNTER
Call placed to Sophia with [facility]  Relayed Dr. White's message     Sophia verbalized understanding  No further questions/concerns     Kike Flynn, Clinic RN  [facility]

## 2024-09-07 ENCOUNTER — HEALTH MAINTENANCE LETTER (OUTPATIENT)
Age: 84
End: 2024-09-07

## 2024-09-16 ENCOUNTER — VIRTUAL VISIT (OUTPATIENT)
Dept: PALLIATIVE MEDICINE | Facility: OTHER | Age: 84
End: 2024-09-16
Payer: MEDICARE

## 2024-09-16 DIAGNOSIS — G89.29 CHRONIC BILATERAL THORACIC BACK PAIN: ICD-10-CM

## 2024-09-16 DIAGNOSIS — M54.12 CHRONIC RADICULAR CERVICAL PAIN: ICD-10-CM

## 2024-09-16 DIAGNOSIS — M54.50 CHRONIC BILATERAL LOW BACK PAIN, UNSPECIFIED WHETHER SCIATICA PRESENT: ICD-10-CM

## 2024-09-16 DIAGNOSIS — G89.29 CHRONIC INTRACTABLE PAIN: ICD-10-CM

## 2024-09-16 DIAGNOSIS — G62.9 PERIPHERAL POLYNEUROPATHY: ICD-10-CM

## 2024-09-16 DIAGNOSIS — G89.29 CHRONIC RADICULAR CERVICAL PAIN: ICD-10-CM

## 2024-09-16 DIAGNOSIS — G89.29 CHRONIC BILATERAL LOW BACK PAIN, UNSPECIFIED WHETHER SCIATICA PRESENT: ICD-10-CM

## 2024-09-16 DIAGNOSIS — M54.6 CHRONIC BILATERAL THORACIC BACK PAIN: ICD-10-CM

## 2024-09-16 DIAGNOSIS — M15.3 POST-TRAUMATIC OSTEOARTHRITIS OF MULTIPLE JOINTS: ICD-10-CM

## 2024-09-16 DIAGNOSIS — M19.012 LOCALIZED PRIMARY OSTEOARTHRITIS OF LEFT SHOULDER REGION: ICD-10-CM

## 2024-09-16 DIAGNOSIS — G89.29 CHRONIC INTRACTABLE PAIN: Primary | ICD-10-CM

## 2024-09-16 PROCEDURE — 99214 OFFICE O/P EST MOD 30 MIN: CPT | Mod: 95 | Performed by: NURSE PRACTITIONER

## 2024-09-16 PROCEDURE — G2211 COMPLEX E/M VISIT ADD ON: HCPCS | Mod: 95 | Performed by: NURSE PRACTITIONER

## 2024-09-16 RX ORDER — OXYCODONE AND ACETAMINOPHEN 7.5; 325 MG/1; MG/1
1 TABLET ORAL 4 TIMES DAILY
Qty: 70 TABLET | Refills: 0 | Status: CANCELLED | OUTPATIENT
Start: 2024-09-16

## 2024-09-16 RX ORDER — FENTANYL 12.5 UG/1
1 PATCH TRANSDERMAL
Qty: 15 PATCH | Refills: 0 | Status: SHIPPED | OUTPATIENT
Start: 2024-09-16

## 2024-09-16 RX ORDER — HYDROMORPHONE HYDROCHLORIDE 2 MG/1
2 TABLET ORAL
Qty: 150 TABLET | Refills: 0 | Status: SHIPPED | OUTPATIENT
Start: 2024-09-16 | End: 2024-10-16

## 2024-09-16 ASSESSMENT — PAIN SCALES - GENERAL: PAINLEVEL: EXTREME PAIN (8)

## 2024-09-16 NOTE — TELEPHONE ENCOUNTER
Added this patient to Anne schedule today at 4pm  son will be able to be with his father (patient) to do a video visit.

## 2024-09-16 NOTE — TELEPHONE ENCOUNTER
Received request for a refill(s) of oxyCODONE-acetaminophen (PERCOCET) 7.5-325 MG per tablet      Last dispensed from pharmacy on 09/05/24    Patient's last office/virtual visit by prescribing provider on 09/16/24  Next office/virtual appointment scheduled for: none    Last urine drug screen date 03/08/24  Current opioid agreement on file (completed within the last year) Yes Date of opioid agreement: 03/08/24    E-prescribe to pharmacy-[id number] - [facility] [place], [place] - [address]     Will route to nursing Salt[place] for review and preparation of prescription(s).

## 2024-09-16 NOTE — TELEPHONE ENCOUNTER
Routing to  to assist with scheduling a visit with Anne. Can be in clinic or video. Contacts says to call son Santo for scheduling appointment. Patient lives in a LTC facility.    Medication refill information reviewed.     Due date for fentaNYL (DURAGESIC) 12 mcg/hr 72 hr patch is 09/20/24     Prescriptions prepped for review.     Will route to provider.

## 2024-09-16 NOTE — TELEPHONE ENCOUNTER
Medication refill information reviewed.     Due date for oxyCODONE-acetaminophen (PERCOCET) 7.5-325 MG per tablet  is 09/19/24 (14 day supply)     Prescriptions prepped for review.     Will route to provider.

## 2024-09-16 NOTE — PROGRESS NOTES
[facility]      Sina Gomez is a 83 year old male who is being evaluated via a billable virtual visit.      VIDEO VISIT   How would you like to obtain your AVS? Meganhart  If you are dropped from the video visit, the video invite should be resent to: Text to cell phone: [phone]  Will anyone else be joining your video visit? YES, son Santo   Is patient CURRENTLY in [place]? YES  If patient encounters technical issues they should call [phone]    Video-Visit Details  Type of service:  Video Visit  Video Start Time: 4:05 PM  Video End Time: 4:23 PM  Total Face to Face Time: 18 minutes   Originating Location (pt. Location): Assisted Living  Distant Location (provider location):  [facility]   Platform used for Video Visit: [employer] [phone]              6/13/2024     8:11 AM 9/16/2024     3:39 PM 9/16/2024     3:40 PM   PEG Score   PEG Total Score 6.67 8 7.33          CHIEF COMPLAINT: Chronic pain    INTERVAL HISTORY:  Last seen on 6/13/2024.        Recommendations/plan at the last visit included:  30 minutes Video follow-up with ALPESH Tucker NP-C in 3 months or sooner.   Procedures recommended: Anne will speak with Aria Tomlin PA-C at [facility] about a nerve ablation or a gel injection  Other: Release of information for [facility] for records and communication  Medication Management :   Discussed Ketamine/Ketoprofen topical cream.   Oxycodone sent to the pharmacy     Since last visit:   - Tried the shoulder blocks but they were not helpful so did not proceed with the left shoulder nerve ablation. No discussion regarding steroid or gel injections at this time.   - Santo expresses concern about how to manage pain medications when the time comes that Osvaldo is no longer able to make it into clinic. Also wondering if there is a way for someone to come into the assisted living to do steroid injections.  It is extremely painful and  difficult for Osvaldo to stand and transfer from bed to wheelchair and wheelchair to car to come to clinic.     Pain Information Today: Score: Extreme Pain (8)/10. Location of pain: widespread, back and joint pain, bilateral shoulders.     Annual Requirements last collected: 3/8/2024     Current Pain Relevant Medications:    Acetaminophen 500 mg 1-2 tabs q 8 h PRN  Buspar 15 mg TID   Diclofenac gel 1% BID PRN   Gabapentin 100 mg: Starting titration up to 300 mg TID.   Trazodone 100 mg at HS      Current Controlled Substance Medications:   Fentanyl 12 mcg change patch q48h = 30 MME/day  Oxycodone/APAP IR 7.5/325 mg QID PRN  Max 45 MME/day              Total opiate dose = 75 MME/day      Previous Pain Relevant Medications: (H--helped; HI--Helped initially; SWH--Somewhat helpful; NH--No help; W--worse; SE--side effects; ?--Unsure if helpful)   Opiates: Oxycodone:H, Fentanyl:?,   NSAIDS:   Migraine medications:   Muscle Relaxants:   Neuropathics:   Anti-depressants for pain:         Anxiety medications: Buspar:H,           Topicals: diclofenac:S[facility]  OTC medications:   Sleep Medications: Trazodone:H  Other medications not covered above: Vitamin D: H     SUBSTANCE HISTORY:   Past or current illegal drug use: Denies   Past or current ETOH use: Past   Nicotine/tobacco use: none   Daily Caffeine intake: 2-3 day     CURRENT FAMILY/SOCIAL SITUATION:  Past/Present occupation: Retired, was in the  then    Housing status: Assisted family   Emotional/Physical support: wife, sons  Safety Concerns: falls risk  Current stressors: denies      THE 4 As OF OPIOID MAINTENANCE ANALGESIA    Analgesia: Is pain relief clinically significant? YES   Activity: Is patient functional and able to perform Activities of Daily Living? YES   Adverse effects: Is patient free from adverse side effects from opiates? YES   Adherence to Rx protocol: Is patient adhering to Controlled Substance Agreement and taking medications ONLY as ordered? YES      Is Narcan prescribed for opiate use >50 MME daily or concurrent use of opiates and benzodiazepines? NO    [place] Board of Pharmacy Data Base Reviewed:    YES; As expected, no concern for misuse/abuse of controlled medications based on this report. Reviewed [facility] September 16, 2024- no concerning fills.    _______________________________________________      Physical Exam and Vital Signs not completed due to virtual visit.     General: Verbal, alert and no distress   Psychiatric:  affect and mood normal, mentation appears normal.     DIRE Score for ongoing opioid management is calculated as follows:    Diagnosis = 3 pts (advanced condition; severe pain/objective findings)    Intractability = 3 pts (patient fully engaged but inadequate response to treatments)    Risk        Psych = 1 pt (serious personality dysfunction/mental illness that significantly interferes with care)         Chem Hlth = 3 pts (no history of chemical dependency; not drug-focused)       Reliability = 3 pts (highly reliable with meds, appointments, treatments)       Social = 2 pts (reduction in some relationships/life rolls)       (Psych + Chem hlth + Reliability + Social) = 15    Efficacy = 1 pt (poor function; minimal pain relief despite mod/high med dose)    DIRE Score = 16        7-13: likely NOT suitable candidate for long-term opioid analgesia       14-21: may be a suitable candidate for long-term opioid analgesia     DIAGNOSTIC RESULTS:     No recent imaging has been done.      PAIN RELAVENT CONDITIONS:   1.  Post traumatic osteoarthritis multiple joints   2.  Chronic low back pain, mid back pain   3. Bilateral SI joint pain R>L   4.  Uncomplicated long term use of opiates.    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 I reviewed that Osvaldo could possibly be admitted to palliative or hospice when the time comes that he is not able to get to the clinic. We also suggest medical rides so he can stay in his wheelchair. Will continue current treatment plan and medications at this time.   Plan: HYDROmorphone (DILAUDID) 2 MG tablet          PATIENT INSTRUCTIONS:     Diagnosis reviewed, treatment option addressed, and risk/benefits discussed.  Self-care instructions given.  I am recommending a multidisciplinary treatment plan to help this patient better manage pain.    Remember to request ALL medication refills 5-7 BUSINESS days before you run out.     30 minute Clinic follow-up with ALPESH Tucker, YAKOV-C BEFORE 12/13/2024.   Medication Management : Continue current medications    I have reviewed the note as documented above.  This accurately captures the substance of my conversation with the patient.    BILLING TIME DOCUMENTATION:   TOTAL TIME on the date of service includes:   Time spent preparing to see the patient: 10 minutes (reviewing records and tests)  Time spend face to face with the patient: 18 minutes  Time spent ordering tests, medications, procedures and referrals: 0 minutes  Time spent Referring and communicating with other healthcare professionals: 0 minutes  Documenting clinical information in Epic: 5 minutes    The total TIME spent on this patient on the day of the appointment was 33 minutes.     ALPESH Alamo, NP-C  [facility]    (Information in italics and blue color are taken from previous pain and consulting medical providers notes and are documented as such)

## 2024-09-16 NOTE — PATIENT INSTRUCTIONS
After Visit Instructions:     Thank you for coming to [facility] for your care. It is my goal to partner with you to help you reach your optimal state of health.   Continue daily self-care, identifying contributing factors, and monitoring variations in pain level. Continue to integrate self-care into your life.      Take 1 tablet by mouth 4 times daily. Scheduled. 1 tab at 8 am, 1 pm, 5 pm, and 9 pm. May also take 1 tablet by mouth daily as needed for severe pain Between 12 AM and 4 AM. Ok to fill/start using on 09/05/24.(14 day supply)       ALPESH Alamo, NP-C  [facility]  [facility] - Monday, Thursday and Friday  [facility] - Tuesday      Be sure to request ALL medication refills 5 days prior to the due date whether or not you will see your medical provider in an appointment before the due date.      Do not expect same day refills. If you do not plan ahead you may run out of medications.     Early refills are not provided.  It is your responsibility to manage your medications responsibility and keep them safely stored. Lost or destroyed medications WILL NOT be replaced    Scheduling/Clinic telephone Number for ALL locations:  [phone]    After Hours On-Call Service:  [phone]    Call with any questions about your care and for scheduling assistance.   Calls are returned Monday through Friday between 8 AM and 4:00 PM. We usually get back to you within 2 business days depending on the issue/request.    If we are prescribing your medications:  For opioid medication refills, call the clinic or send a [employer] message 7 days in advance.  Please include:  Your name and date of birth.   Name of requested medication  Name of the pharmacy.  For non-opioid medications, call your pharmacy directly to request a refill. Please allow 3-4 days to be processed.   Per [place] State Law:  All controlled substance prescriptions must be filled within 30 days of being  written.    For those controlled substances allowing refills, pickup must occur within 30 days of last fill.      We believe regular attendance is key to your success in our program!    Any time you are unable to keep your appointment we ask that you call us at least 24 hours in advance to cancel.This will allow us to offer the appointment time to another patient.   Multiple missed appointments may lead to dismissal from the clinic.

## 2024-09-16 NOTE — TELEPHONE ENCOUNTER
During appointment with Osvaldo and his son Santo, we decided to switch to a different IR opiate.     ALPESH Alamo, NP-C  [facility]

## 2024-09-16 NOTE — TELEPHONE ENCOUNTER
Script Eprescribed to pharmacy    Signed Prescriptions:                        Disp   Refills    fentaNYL (DURAGESIC) 12 mcg/hr 72 hr patch 15 pat*0        Sig: Place 1 patch onto the skin every 48 hours. Ok to           fill 09/18/24 to begin using 09/20/24 30 days           supply for chronic pain.  Authorizing Provider: LUCIAN HOPPER APRN, NP-C  [facility]

## 2024-09-16 NOTE — TELEPHONE ENCOUNTER
Received request for a refill(s) of fentaNYL (DURAGESIC) 12 mcg/hr 72 hr patch      Last dispensed from pharmacy on 08/18/24    Patient's last office/virtual visit by prescribing provider on 06/13/24  Next office/virtual appointment scheduled for: none    Last urine drug screen date 03/08/24  Current opioid agreement on file (completed within the last year) Yes Date of opioid agreement: 03/08/24    E-prescribe to pharmacy-[id number] [place] [address]      Will route to nursing [place] for review and preparation of prescription(s).

## 2024-09-17 ENCOUNTER — TELEPHONE (OUTPATIENT)
Dept: PALLIATIVE MEDICINE | Facility: OTHER | Age: 84
End: 2024-09-17
Payer: MEDICARE

## 2024-09-17 ENCOUNTER — TELEPHONE (OUTPATIENT)
Dept: FAMILY MEDICINE | Facility: CLINIC | Age: 84
End: 2024-09-17
Payer: MEDICARE

## 2024-09-17 NOTE — TELEPHONE ENCOUNTER
I left a message for the patient to return Jazmine STEELE At [facility],  please transfer call to [phone]      Jazmine

## 2024-09-17 NOTE — TELEPHONE ENCOUNTER
Rita from [place] where patient resides has a question regarding pain medication Dilaudid and Percocet, should one be discontinued, do not want patient to be on both?    These medications are prescribed through pain clinic, advised Rita to call Anne Herman NP's care team in the pain management to determine this question, contact number given to Rita to call pain clinic to discuss.      JOSELYN Guerrier

## 2024-09-25 ENCOUNTER — APPOINTMENT (OUTPATIENT)
Dept: MRI IMAGING | Facility: HOSPITAL | Age: 84
End: 2024-09-25
Attending: EMERGENCY MEDICINE
Payer: MEDICARE

## 2024-09-25 ENCOUNTER — HOSPITAL ENCOUNTER (EMERGENCY)
Facility: HOSPITAL | Age: 84
Discharge: HOME | End: 2024-09-25
Attending: EMERGENCY MEDICINE | Admitting: EMERGENCY MEDICINE
Payer: MEDICARE

## 2024-09-25 VITALS
TEMPERATURE: 99.3 F | OXYGEN SATURATION: 92 % | RESPIRATION RATE: 22 BRPM | DIASTOLIC BLOOD PRESSURE: 76 MMHG | SYSTOLIC BLOOD PRESSURE: 167 MMHG | HEIGHT: 71 IN | BODY MASS INDEX: 28.73 KG/M2 | HEART RATE: 60 BPM

## 2024-09-25 DIAGNOSIS — R40.4 TRANSIENT ALTERATION OF AWARENESS: ICD-10-CM

## 2024-09-25 LAB
ALBUMIN SERPL BCG-MCNC: 3.3 G/DL (ref 3.5–5.2)
ALP SERPL-CCNC: 108 U/L (ref 40–150)
ALT SERPL W P-5'-P-CCNC: 18 U/L (ref 0–70)
AMMONIA PLAS-SCNC: 11 UMOL/L (ref 16–60)
ANION GAP SERPL CALCULATED.3IONS-SCNC: 6 MMOL/L (ref 7–15)
AST SERPL W P-5'-P-CCNC: 37 U/L (ref 0–45)
BASOPHILS # BLD AUTO: 0 10E3/UL (ref 0–0.2)
BASOPHILS NFR BLD AUTO: 0 %
BILIRUB SERPL-MCNC: 0.5 MG/DL
BUN SERPL-MCNC: 20.5 MG/DL (ref 8–23)
CALCIUM SERPL-MCNC: 9 MG/DL (ref 8.8–10.4)
CHLORIDE SERPL-SCNC: 103 MMOL/L (ref 98–107)
CREAT SERPL-MCNC: 0.79 MG/DL (ref 0.67–1.17)
EGFRCR SERPLBLD CKD-EPI 2021: 87 ML/MIN/1.73M2
EOSINOPHIL # BLD AUTO: 0.1 10E3/UL (ref 0–0.7)
EOSINOPHIL NFR BLD AUTO: 2 %
ERYTHROCYTE [DISTWIDTH] IN BLOOD BY AUTOMATED COUNT: 14 % (ref 10–15)
GLUCOSE SERPL-MCNC: 94 MG/DL (ref 70–99)
HCO3 SERPL-SCNC: 30 MMOL/L (ref 22–29)
HCT VFR BLD AUTO: 35.6 % (ref 40–53)
HGB BLD-MCNC: 10.8 G/DL (ref 13.3–17.7)
IMM GRANULOCYTES # BLD: 0 10E3/UL
IMM GRANULOCYTES NFR BLD: 0 %
LYMPHOCYTES # BLD AUTO: 1 10E3/UL (ref 0.8–5.3)
LYMPHOCYTES NFR BLD AUTO: 14 %
MCH RBC QN AUTO: 30 PG (ref 26.5–33)
MCHC RBC AUTO-ENTMCNC: 30.3 G/DL (ref 31.5–36.5)
MCV RBC AUTO: 99 FL (ref 78–100)
MONOCYTES # BLD AUTO: 0.8 10E3/UL (ref 0–1.3)
MONOCYTES NFR BLD AUTO: 11 %
NEUTROPHILS # BLD AUTO: 4.9 10E3/UL (ref 1.6–8.3)
NEUTROPHILS NFR BLD AUTO: 72 %
NRBC # BLD AUTO: 0 10E3/UL
NRBC BLD AUTO-RTO: 0 /100
PLATELET # BLD AUTO: 170 10E3/UL (ref 150–450)
POTASSIUM SERPL-SCNC: 4 MMOL/L (ref 3.4–5.3)
PROT SERPL-MCNC: 6.3 G/DL (ref 6.4–8.3)
RBC # BLD AUTO: 3.6 10E6/UL (ref 4.4–5.9)
SODIUM SERPL-SCNC: 139 MMOL/L (ref 135–145)
T4 FREE SERPL-MCNC: 0.88 NG/DL (ref 0.9–1.7)
TSH SERPL DL<=0.005 MIU/L-ACNC: 5.97 UIU/ML (ref 0.3–4.2)
WBC # BLD AUTO: 6.7 10E3/UL (ref 4–11)

## 2024-09-25 PROCEDURE — 85048 AUTOMATED LEUKOCYTE COUNT: CPT | Performed by: EMERGENCY MEDICINE

## 2024-09-25 PROCEDURE — 255N000002 HC RX 255 OP 636: Performed by: EMERGENCY MEDICINE

## 2024-09-25 PROCEDURE — 36415 COLL VENOUS BLD VENIPUNCTURE: CPT | Performed by: EMERGENCY MEDICINE

## 2024-09-25 PROCEDURE — 84439 ASSAY OF FREE THYROXINE: CPT | Performed by: EMERGENCY MEDICINE

## 2024-09-25 PROCEDURE — 99285 EMERGENCY DEPT VISIT HI MDM: CPT | Mod: 25

## 2024-09-25 PROCEDURE — 84443 ASSAY THYROID STIM HORMONE: CPT | Performed by: EMERGENCY MEDICINE

## 2024-09-25 PROCEDURE — A9585 GADOBUTROL INJECTION: HCPCS | Performed by: EMERGENCY MEDICINE

## 2024-09-25 PROCEDURE — 80053 COMPREHEN METABOLIC PANEL: CPT | Performed by: EMERGENCY MEDICINE

## 2024-09-25 PROCEDURE — 93005 ELECTROCARDIOGRAM TRACING: CPT | Performed by: EMERGENCY MEDICINE

## 2024-09-25 PROCEDURE — 70553 MRI BRAIN STEM W/O & W/DYE: CPT | Mod: MA

## 2024-09-25 PROCEDURE — 82140 ASSAY OF AMMONIA: CPT | Performed by: EMERGENCY MEDICINE

## 2024-09-25 PROCEDURE — 250N000013 HC RX MED GY IP 250 OP 250 PS 637: Performed by: EMERGENCY MEDICINE

## 2024-09-25 RX ORDER — HYDROMORPHONE HYDROCHLORIDE 2 MG/1
2 TABLET ORAL ONCE
Status: COMPLETED | OUTPATIENT
Start: 2024-09-25 | End: 2024-09-25

## 2024-09-25 RX ORDER — OXYCODONE AND ACETAMINOPHEN 5; 325 MG/1; MG/1
1 TABLET ORAL ONCE
Status: DISCONTINUED | OUTPATIENT
Start: 2024-09-25 | End: 2024-09-25

## 2024-09-25 RX ORDER — GADOBUTROL 604.72 MG/ML
9.5 INJECTION INTRAVENOUS ONCE
Status: COMPLETED | OUTPATIENT
Start: 2024-09-25 | End: 2024-09-25

## 2024-09-25 RX ADMIN — GADOBUTROL 9.5 ML: 604.72 INJECTION INTRAVENOUS at 18:55

## 2024-09-25 RX ADMIN — HYDROMORPHONE HYDROCHLORIDE 2 MG: 2 TABLET ORAL at 19:48

## 2024-09-25 ASSESSMENT — ACTIVITIES OF DAILY LIVING (ADL)
ADLS_ACUITY_SCORE: 38

## 2024-09-25 NOTE — ED NOTES
Bed: JN-  Expected date: 9/25/24  Expected time: 4:33 PM  Means of arrival: Ambulance  Comments:  [facility] - 83YOM AMS, HX DEMENTIA, SPEAKING NONSENSICAL IN LAST FEW HOURS,   NEURO EXAM  IN ROOM

## 2024-09-25 NOTE — ED NOTES
Expected Patient Referral to ED  4:25 PM    Referring Clinic/Provider:  [facility] from assisted living    Reason for referral/Clinical facts:  Assisted living patient, at 2:30 patient reported he was less alert and flailing, coherent when asked his name and then back into hallucinating and confusion, patient reports he was speaking Chinese, but does not known Chinese. Patient denied pain. He is chronically on fentanyl patch for chronic left shoulder pain, has Alzheimer's dementia.    Recommendations provided:  Send to ED for further evaluation    Caller was informed that this institution does possess the capabilities and/or resources to provide for patient and should be transferred to our facility.    Discussed that if direct admit is sought and any hurdles are encountered, this ED would be happy to see the patient and evaluate.    Informed caller that recommendations provided are recommendations based only on the facts provided and that they responsible to accept or reject the advice, or to seek a formal in person consultation as needed and that this ED will see/treat patient should they arrive.      Andreina Vallecillo MD  [facility] [place] EMERGENCY DEPARTMENT  [address] [place] [id number] [phone]       Andreina Vallecillo MD  09/25/24 [id number]

## 2024-09-25 NOTE — ED TRIAGE NOTES
Patient arrives via EMS from his assisted living. Staff report over the past two hours patient has been intermittently more confused and speaking gibberish.   Provider at bedside and completed jessa katz.   Hx of  alzheimer's.

## 2024-09-25 NOTE — ED PROVIDER NOTES
EMERGENCY DEPARTMENT ENCOUNTER      NAME: Sina Gomez  AGE: 83 year old male  YOB: 1940  MRN: [id number]  EVALUATION DATE & TIME: 2024  4:56 PM    PCP: Brionna White    ED PROVIDER: Audi Sanchez M.D.      Chief Complaint   Patient presents with    Altered Mental Status         FINAL IMPRESSION:  1. Transient alteration of awareness          ED COURSE & MEDICAL DECISION MAKIN year old male presents to the Emergency Department for evaluation of transient confusion and abnormal speech.  Patient arrives from his memory care facility with fairly abrupt onset of episodic unintelligible speech.  Per EMS he was looking off, speaking completely nonsensical phrases.  At times they can get him to respond to direct questioning.  By time of arrival to seems to be improving.  On my initial evaluation he is able to interact, he is aware that he is at the hospital, is able to very easily identify multiple simple objects in the room.  His speech is not dysarthric.  The remainder of his neurological exam is nonfocal.  His son presents shortly after arrival and reports that he has been having an accelerating cognitive decline over the last couple of weeks to months.  He has never had an episode similar to this however.  He also struggles with chronic pain, is on a fentanyl patch and recently transition to hydromorphone instead of Percocet for additional pain management.  He underwent a broad lab and imaging evaluation as below.  Consideration was given for acute stroke given the symptoms of possible word finding difficulties and speech change.  Ultimately his exam does not seem quite as consistent with this and a brain MRI is negative for evidence of acute ischemia or other intracranial process.  Patient underwent additional lab evaluations revealed stable metabolic profile, no signs of infection, and no other abnormality to directly account for the patient's symptoms today.  Patient was  reevaluated upon completion of his workup.  Had discussion with the patient's son.  Currently he seems much more improved, is not having any severe disorientation, his unintelligible speech has now resolved.  Discussed that we could not definitively exclude something like a TIA however this seems less likely given his presentation with waxing and waning symptoms.  Would be more suspicious about some component of his dementia and possibly some delirium earlier in the day.  We discussed options including potentially hospitalization.  Given the results of his workup so far reassuring, his son would prefer to keep him at his facility if at all feasible and I think this would not be unreasonable under the circumstance given the reassuring findings.  We did discuss return precautions.  They are going to reach out to primary and his established neurologist to review the overall trend and any lingering concerns after this emergency department visit.    At the conclusion of the encounter I discussed the results of all of the tests and the disposition. The questions were answered. The patient or family acknowledged understanding and was agreeable with the care plan.       Medical Decision Making  Obtained supplemental history:Supplemental history obtained?: EMS  Reviewed external records: External records reviewed?: Outpatient Record: [facility] 9/16  Care impacted by chronic illness:Cancer/Chemotherapy, Chronic Lung Disease, Chronic Pain, Heart Disease, Hyperlipidemia, Hypertension, and Mental Health  Care significantly affected by social determinants of health:Other: Alzheimer's  Did you consider but not order tests?: Work up considered but not performed and documented in chart, if applicable  Did you interpret images independently?: Independent interpretation of ECG and images noted in documentation, when applicable.  Consultation discussion with other provider:Did you involve another provider (consultant, , pharmacy,  etc.)?: No  Discharge. No recommendations on prescription strength medication(s). See documentation for any additional details.  Not Applicable          MEDICATIONS GIVEN IN THE EMERGENCY:  Medications   gadobutrol (GADAVIST) injection 9.5 mL (9.5 mLs Intravenous $Given 9/25/24 5205)   HYDROmorphone (DILAUDID) tablet 2 mg (2 mg Oral $Given 9/25/24 1948)       NEW PRESCRIPTIONS STARTED AT TODAY'S ER VISIT  Discharge Medication List as of 9/25/2024  7:51 PM             =================================================================    HPI    Patient information was obtained from: EMS, patient    Use of : N/A       Sina Gomez is a 83 year old male with a pertinent history of Alzheimers, prostate cancer, s/p TAVR, CVA, and CAD who presents to this ED via referral/EMS for evaluation of altered mental status.    Patient presents from assisted living where he lives with his wife. At 2:15 PM, his wife and staff noticed he was going in and out of lucidness, and rambling in an unknown language (he only knows English). He is redirectable if you use his name. He is mildly conversive. He is somewhat oriented to place, knows he is in a hospital but not which one. Vital signs stable, blood glucose of 103. Patient is non-ambulatory at baseline.    Patient endorses left shoulder pain as his only complaints. Notes this is chronic.    Seen 9/16 at [facility] for chronic left shoulder pain. Prescribed oxycodone, referred to [place].     REVIEW OF SYSTEMS   All systems reviewed and negative except as noted in HPI.    PAST MEDICAL HISTORY:  Past Medical History:   Diagnosis Date    Cerebral artery occlusion with cerebral infarction (H)     Coronary artery disease     Depression     Sepsis (H) 06/14/2020    UTI with septic hock    Sleep apnea     TBI (traumatic brain injury) (H) 1968       PAST SURGICAL HISTORY:  Past Surgical History:   Procedure Laterality Date    COLONOSCOPY N/A 7/20/2022    Procedure:  COLONOSCOPY;  Surgeon: Dexter Schmitt MD;  Location: [facility] OR    CV FEMORAL ANGIOGRAM      HC VALVE (TAVR)      left hip replacement Left 05/03/1995    REPLACEMENT TOTAL KNEE Left            CURRENT MEDICATIONS:    No current facility-administered medications for this encounter.     Current Outpatient Medications   Medication Sig Dispense Refill    acetaminophen (TYLENOL) 500 MG tablet Take 1-2 tablets (500-1,000 mg) by mouth every 8 hours as needed for mild pain 30 tablet 0    aspirin (ASA) 81 MG chewable tablet Take 1 tablet (81 mg) by mouth daily 90 tablet 1    atorvastatin (LIPITOR) 40 MG tablet TAKE 1 AND 1/2 TABS (60MG) BY MOUTH AT BEDTIME 135 tablet 3    busPIRone (BUSPAR) 15 MG tablet TAKE 1 TAB BY MOUTH EVERY 8HOURS 90 tablet 11    clopidogrel (PLAVIX) 75 MG tablet TAKE 1 TABLET BY MOUTH ONCE DAILY 90 tablet 3    donepezil (ARICEPT) 10 MG tablet TAKE 1 TABLET (10 MG) BY MOUTH AT BEDTIME 90 tablet 1    fentaNYL (DURAGESIC) 12 mcg/hr 72 hr patch Place 1 patch onto the skin every 48 hours. Ok to fill 09/18/24 to begin using 09/20/24 30 days supply for chronic pain. 15 patch 0    furosemide (LASIX) 20 MG tablet TAKE 1 TABLET BY MOUTH DAILY 30 tablet 11    gabapentin (NEURONTIN) 300 MG capsule TAKE 1 CAPSULE BY MOUTH THREE TIMES DAILY 90 capsule 5    HYDROmorphone (DILAUDID) 2 MG tablet Take 1 tablet (2 mg) by mouth 5 times daily. Take 1 tablet by mouth 4 times daily. Scheduled. 1 tab at 2 am, 7 am, 12 pm, 5 pm, 10 pm. Ok to fill/start September 16, 2024 150 tablet 0    isosorbide mononitrate (IMDUR) 30 MG 24 hr tablet       isosorbide mononitrate (IMDUR) 30 MG 24 hr tablet Take 1 tablet (30 mg) by mouth daily 90 tablet 3    levothyroxine (SYNTHROID/LEVOTHROID) 50 MCG tablet TAKE 1 TABLET (50 MCG) BY MOUTH DAILY 30 tablet 0    memantine (NAMENDA) 5 MG tablet Take 1 tablet (5 mg) by mouth 2 times daily 5 mg daily for week 1. Then increase by 5 mg every week till on 10 mg twice a day- as  tolerated. 360 tablet 3    naloxone (NARCAN) 4 MG/0.1ML nasal spray Spray 1 spray (4 mg) into one nostril alternating nostrils as needed for opioid reversal every 2-3 minutes until assistance arrives 0.2 mL 1    nitroGLYcerin (NITROSTAT) 0.4 MG sublingual tablet PLACE 1 TAB UNDER TONGUE EVERY 5min AS NEEDED FOR CHEST PAIN MAX 3 TABS/EPISODE 25 tablet 11    nystatin (MYCOSTATIN) 896435 UNIT/GM external powder Apply topically 2 times daily To groin area.  Okay to self administer      OLANZapine (ZYPREXA) 2.5 MG tablet Take 1 tablet (2.5 mg) by mouth at bedtime 90 tablet 1    olmesartan (BENICAR) 20 MG tablet TAKE 1 TAB BY MOUTH ONCE DAILY 90 tablet 0    oxyCODONE-acetaminophen (PERCOCET) 7.5-325 MG per tablet Take 1 tablet by mouth 4 times daily. Scheduled. 1 tab at 8 am, 1 pm, 5 pm, and 9 pm. May also take 1 tablet by mouth daily as needed for severe pain Between 12 AM and 4 AM. Ok to fill/start using on 24.(14 day supply) 70 tablet 0    polyethylene glycol (MIRALAX) 17 GM/Dose powder Take as directed for colonoscopy prep 255 g 0    sertraline (ZOLOFT) 100 MG tablet TAKE 1 TABLET BY MOUTH DAILY 90 tablet 3    traZODone (DESYREL) 100 MG tablet TAKE 1 TABLET BY MOUTH AT BEDTIME 90 tablet 1         ALLERGIES:  Allergies   Allergen Reactions    Penicillins Difficulty breathing       FAMILY HISTORY:  No family history on file.    SOCIAL HISTORY:   Social History     Socioeconomic History    Marital status:    Tobacco Use    Smoking status: Former     Current packs/day: 0.00     Types: Cigarettes     Quit date:      Years since quittin.7     Passive exposure: Never    Smokeless tobacco: Never   Vaping Use    Vaping status: Never Used   Substance and Sexual Activity    Alcohol use: Never    Drug use: Never     Social Determinants of Health      [facility] [facility] [employer] [facility] [employer] [facility]    Financial Resource Strain    Received from [facility], [facility]    Social Connections       VITALS:  BP (!) 167/76   Pulse 60   Temp 99.3  F (37.4  C) (Oral)   Resp 22   Ht 1.803 m (5' 11\")   SpO2 92%   BMI 28.73 kg/m      PHYSICAL EXAM    Constitutional: Elderly male patient, laying in bed, no acute distress  HENT: Normocephalic, Atraumatic. Neck Supple.  Eyes: EOMI, Conjunctiva normal.  Respiratory: Breathing comfortably on room air. Speaks full sentences easily. Lungs clear to ascultation.  Cardiovascular: Normal heart rate, Regular rhythm. No peripheral edema.  Abdomen: Soft, nontender  Musculoskeletal: Good range of motion in all major joints. No major deformities noted.  Integument: Warm, Dry.  Neurologic: Awake and alert.  Oriented to self and place, not fully to time or situation.  Speech is intelligible.  Able to identify simple large's.  No evidence of dysarthria.  Cranial nerves II through XII grossly intact.  Strength is preserved and symmetric throughout bilateral upper and lower extremities.  Sensation light touch intact x 4.  Psychiatric: Cooperative. Affect appropriate.     LAB:  All pertinent labs reviewed and interpreted.  Labs Ordered and Resulted from Time of ED Arrival to Time of ED Departure   COMPREHENSIVE METABOLIC PANEL - Abnormal       Result Value    Sodium 139      Potassium 4.0      Carbon Dioxide (CO2) 30 (*)     Anion Gap 6 (*)     Urea Nitrogen 20.5      Creatinine 0.79      GFR Estimate 87      Calcium 9.0      Chloride 103      Glucose 94      Alkaline Phosphatase 108      AST 37      ALT 18      Protein Total 6.3 (*)     Albumin 3.3 (*)     Bilirubin Total 0.5     TSH WITH FREE T4 REFLEX - Abnormal    TSH 5.97 (*)    AMMONIA - Abnormal    Ammonia 11 (*)    CBC WITH PLATELETS AND DIFFERENTIAL - Abnormal    WBC Count 6.7      RBC Count 3.60 (*)     Hemoglobin 10.8 (*)     Hematocrit 35.6 (*)     MCV 99      MCH 30.0      MCHC 30.3 (*)     RDW 14.0      " Platelet Count 170      % Neutrophils 72      % Lymphocytes 14      % Monocytes 11      % Eosinophils 2      % Basophils 0      % Immature Granulocytes 0      NRBCs per 100 WBC 0      Absolute Neutrophils 4.9      Absolute Lymphocytes 1.0      Absolute Monocytes 0.8      Absolute Eosinophils 0.1      Absolute Basophils 0.0      Absolute Immature Granulocytes 0.0      Absolute NRBCs 0.0     T4 FREE - Abnormal    Free T4 0.88 (*)        RADIOLOGY:  Reviewed all pertinent imaging. Please see official radiology report.  MR Brain w/o & w Contrast   Final Result   IMPRESSION:   1.  No restricted diffusion to suggest acute ischemia. Chronic changes as above.          EKG:    Performed at: 17:02 09/25/24    Impression: Sinus rhythm with first-degree AV block. Incomplete right bundle branch block.  Inferior infarct (cited on or before June 19, 2024)    Rate: 62 bpm  Rhythm: sinus with first-degree AV block  Axis:  52 63 29  [place] Interval: 236 ms  QRS Interval: 106 ms  QTc Interval: 428 ms  ST Changes: None  Comparison: When compared with ECG of June 19, 2024, incomplete right bundle branch block is now present    I have independently reviewed and interpreted the EKG(s) documented above.    PROCEDURES:   NA      I, Rosalina Wolfe, am serving as a scribe to document services personally performed by Dr. Audi Sanchez, based on my observation and the provider's statements to me. I, Audi Sanchez MD attest that Rosalina Wolfe is acting in a scribe capacity, has observed my performance of the services and has documented them in accordance with my direction.    Audi Sanchez M.D.  Emergency Medicine  [facility] EMERGENCY DEPARTMENT  [address] [place] [id number] [phone]  Dept: [phone]       Audi Sanchez MD  09/25/24 0631

## 2024-09-26 NOTE — DISCHARGE INSTRUCTIONS
Osvaldo was seen in the emergency department today for an episode of speech problems and transient confusion beyond his baseline dementia.  The symptoms seem to have improved.  He had a broad lab and imaging evaluation today which included a brain MRI negative for evidence of recent stroke and labs which did not show any signs of infection, electrolyte problems, or other explanation for his symptoms today.  Since things are seeming to calm down and his evaluation here is stable, I think it be reasonable to keep an eye on him at his residence for now.  We agree with the plan to contact his neurologist and primary care for follow-up especially due to the concerns of increasing progression of his dementia.  If there are other concerns like severe confusion, fever, inability to eat, severe weakness, vomiting, etc. we can reevaluate as needed in the emergency department.

## 2024-09-28 LAB
ATRIAL RATE - MUSE: 62 BPM
DIASTOLIC BLOOD PRESSURE - MUSE: NORMAL MMHG
INTERPRETATION ECG - MUSE: NORMAL
P AXIS - MUSE: 52 DEGREES
PR INTERVAL - MUSE: 236 MS
QRS DURATION - MUSE: 106 MS
QT - MUSE: 422 MS
QTC - MUSE: 428 MS
R AXIS - MUSE: 63 DEGREES
SYSTOLIC BLOOD PRESSURE - MUSE: NORMAL MMHG
T AXIS - MUSE: 29 DEGREES
VENTRICULAR RATE- MUSE: 62 BPM

## 2024-09-30 ENCOUNTER — DOCUMENTATION ONLY (OUTPATIENT)
Dept: OTHER | Facility: CLINIC | Age: 84
End: 2024-09-30
Payer: MEDICARE

## 2024-10-15 DIAGNOSIS — G89.29 CHRONIC BILATERAL LOW BACK PAIN, UNSPECIFIED WHETHER SCIATICA PRESENT: ICD-10-CM

## 2024-10-15 DIAGNOSIS — M54.6 CHRONIC BILATERAL THORACIC BACK PAIN: ICD-10-CM

## 2024-10-15 DIAGNOSIS — G89.29 CHRONIC INTRACTABLE PAIN: ICD-10-CM

## 2024-10-15 DIAGNOSIS — M19.012 LOCALIZED PRIMARY OSTEOARTHRITIS OF LEFT SHOULDER REGION: ICD-10-CM

## 2024-10-15 DIAGNOSIS — G62.9 PERIPHERAL POLYNEUROPATHY: ICD-10-CM

## 2024-10-15 DIAGNOSIS — G89.29 CHRONIC BILATERAL THORACIC BACK PAIN: ICD-10-CM

## 2024-10-15 DIAGNOSIS — M15.3 POST-TRAUMATIC OSTEOARTHRITIS OF MULTIPLE JOINTS: ICD-10-CM

## 2024-10-15 DIAGNOSIS — M54.50 CHRONIC BILATERAL LOW BACK PAIN, UNSPECIFIED WHETHER SCIATICA PRESENT: ICD-10-CM

## 2024-10-15 RX ORDER — HYDROMORPHONE HYDROCHLORIDE 2 MG/1
2 TABLET ORAL
Qty: 150 TABLET | Refills: 0 | Status: SHIPPED | OUTPATIENT
Start: 2024-10-15 | End: 2024-11-12

## 2024-10-15 NOTE — TELEPHONE ENCOUNTER
Chart reviewed - Patient of Anne Herman CNP. Request appears appropriate.  checked, no concerns. Refilled for 30 day supply.     Allyson Castro DNP, APRN, AGNP-C  [facility]

## 2024-10-15 NOTE — TELEPHONE ENCOUNTER
Medication refill information reviewed.     Due date for HYDROmorphone (DILAUDID) 2 MG tablet  is 10/16/24     Prescriptions prepped for review.     Will route to provider.

## 2024-10-15 NOTE — TELEPHONE ENCOUNTER
Received request for a refill(s) of     HYDROmorphone (DILAUDID) 2 MG tablet        Last dispensed from pharmacy on 10/11/24    Patient's last office/virtual visit by prescribing provider on 09/16/24  Next office/virtual appointment scheduled for 11/22/24    Last urine drug screen date 03/08/24  Current opioid agreement on file (completed within the last year) Yes Date of opioid agreement: 03/08/24    [id number] [place] [facility] [address]      Will route to nursing [place] for review and preparation of prescription(s).

## 2024-10-23 DIAGNOSIS — F02.80 MAJOR NEUROCOGNITIVE DISORDER DUE TO ALZHEIMER'S DISEASE (H): ICD-10-CM

## 2024-10-23 DIAGNOSIS — G30.9 MAJOR NEUROCOGNITIVE DISORDER DUE TO ALZHEIMER'S DISEASE (H): ICD-10-CM

## 2024-10-23 DIAGNOSIS — R41.89 SUBJECTIVE MEMORY COMPLAINTS: ICD-10-CM

## 2024-10-23 DIAGNOSIS — E03.4 HYPOTHYROIDISM DUE TO ACQUIRED ATROPHY OF THYROID: ICD-10-CM

## 2024-10-23 RX ORDER — DONEPEZIL HYDROCHLORIDE 10 MG/1
10 TABLET, FILM COATED ORAL AT BEDTIME
Qty: 90 TABLET | Refills: 0 | Status: SHIPPED | OUTPATIENT
Start: 2024-10-23

## 2024-10-23 NOTE — TELEPHONE ENCOUNTER
Refill request for: donepezil (ARICEPT) 10 MG tablet    Directions: TAKE 1 TABLET (10 MG) BY MOUTH AT BEDTIME     LOV: 7/1/24  NOV: 1/20/25    90 day supply with 0 refills Medication T'd for review and signature  Kasia Allen CMA on 10/23/2024 at 11:06 AM  [facility]

## 2024-10-26 RX ORDER — LEVOTHYROXINE SODIUM 50 UG/1
50 TABLET ORAL DAILY
Qty: 30 TABLET | Refills: 0 | Status: SHIPPED | OUTPATIENT
Start: 2024-10-26

## 2024-11-12 DIAGNOSIS — M54.6 CHRONIC BILATERAL THORACIC BACK PAIN: ICD-10-CM

## 2024-11-12 DIAGNOSIS — G62.9 PERIPHERAL POLYNEUROPATHY: ICD-10-CM

## 2024-11-12 DIAGNOSIS — G89.29 CHRONIC INTRACTABLE PAIN: ICD-10-CM

## 2024-11-12 DIAGNOSIS — G89.29 CHRONIC BILATERAL THORACIC BACK PAIN: ICD-10-CM

## 2024-11-12 DIAGNOSIS — M54.50 CHRONIC BILATERAL LOW BACK PAIN, UNSPECIFIED WHETHER SCIATICA PRESENT: ICD-10-CM

## 2024-11-12 DIAGNOSIS — G89.29 CHRONIC BILATERAL LOW BACK PAIN, UNSPECIFIED WHETHER SCIATICA PRESENT: ICD-10-CM

## 2024-11-12 DIAGNOSIS — M19.012 LOCALIZED PRIMARY OSTEOARTHRITIS OF LEFT SHOULDER REGION: ICD-10-CM

## 2024-11-12 DIAGNOSIS — M15.3 POST-TRAUMATIC OSTEOARTHRITIS OF MULTIPLE JOINTS: ICD-10-CM

## 2024-11-12 RX ORDER — HYDROMORPHONE HYDROCHLORIDE 2 MG/1
2 TABLET ORAL
Qty: 150 TABLET | Refills: 0 | Status: SHIPPED | OUTPATIENT
Start: 2024-11-12

## 2024-11-12 NOTE — TELEPHONE ENCOUNTER
Script Eprescribed to pharmacy    Signed Prescriptions:                        Disp   Refills    HYDROmorphone (DILAUDID) 2 MG tablet       150 ta*0        Sig: Take 1 tablet (2 mg) by mouth 5 times daily. Take 1           tablet by mouth 4 times daily. Scheduled. 1 tab           at 2 am, 7 am, 12 pm, 5 pm, 10 pm. Ok to emanuel           11/13/24 to start 11/15/24  Authorizing Provider: LUCIAN HOPPER APRN, NP-C  [facility] [place]

## 2024-11-12 NOTE — TELEPHONE ENCOUNTER
Name from pharmacy: HYDROMORPHONE 2MG TABLET         Will file in chart as: HYDROmorphone (DILAUDID) 2 MG tablet    Sig: TAKE 1 TAB BY MOUTH 5 TIMESDAILY AT 2AM, 7AM, 12PM, 5PM, 10PM; TAKE 1 TAB BY MOUTH 5 TIMESDAILY AT 2AM, 7AM, 12PM, 5PM, 10PM     important  Maximum MME cannot be calculated for this prescription. Enter discrete sig details to calculate maximum MME.       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

## 2024-11-12 NOTE — TELEPHONE ENCOUNTER
Medication refill information reviewed.     Due date for Hydromorphone  is 11/15/2024     Prescriptions prepped for review.     Will route to provider.

## 2024-11-12 NOTE — TELEPHONE ENCOUNTER
Received call from patient requesting refill(s) of Hydromorphone    Last dispensed from pharmacy on: Nov. 9, 2024      Patient's last office/virtual visit by prescribing provider on: Sep. 16, 2024   Next office/virtual appointment scheduled for: None on file     UDT: Mar. 8, 2024   CSA: Mar. 8, 2024       E-prescribe to [facility] ONLY #[id number] [facility] [address]    Will route to nursing [place] for review and preparation of prescription(s).

## 2024-11-13 DIAGNOSIS — M54.50 CHRONIC BILATERAL LOW BACK PAIN, UNSPECIFIED WHETHER SCIATICA PRESENT: ICD-10-CM

## 2024-11-13 DIAGNOSIS — M54.12 CHRONIC RADICULAR CERVICAL PAIN: ICD-10-CM

## 2024-11-13 DIAGNOSIS — M54.6 CHRONIC BILATERAL THORACIC BACK PAIN: ICD-10-CM

## 2024-11-13 DIAGNOSIS — G62.9 PERIPHERAL POLYNEUROPATHY: ICD-10-CM

## 2024-11-13 DIAGNOSIS — G89.29 CHRONIC RADICULAR CERVICAL PAIN: ICD-10-CM

## 2024-11-13 DIAGNOSIS — G89.29 CHRONIC BILATERAL LOW BACK PAIN, UNSPECIFIED WHETHER SCIATICA PRESENT: ICD-10-CM

## 2024-11-13 DIAGNOSIS — G89.29 CHRONIC INTRACTABLE PAIN: ICD-10-CM

## 2024-11-13 DIAGNOSIS — G89.29 CHRONIC BILATERAL THORACIC BACK PAIN: ICD-10-CM

## 2024-11-13 DIAGNOSIS — M15.3 POST-TRAUMATIC OSTEOARTHRITIS OF MULTIPLE JOINTS: ICD-10-CM

## 2024-11-13 NOTE — TELEPHONE ENCOUNTER
Received request for a refill(s) of fentaNYL (DURAGESIC) 12 mcg/hr 72 hr patch      Last dispensed from pharmacy on 10/19/24    Patient's last office/virtual visit by prescribing provider on 9/16/24  Next office/virtual appointment scheduled for none     Last urine drug screen date 03/08/2024  Current opioid agreement on file (completed within the last year) Yes Date of opioid agreement: 03/08/2024    E-prescribe to   [facility] Only #[id number] - [place] - [address]   pharmacy    Will route to nursing [place] for review and preparation of prescription(s).

## 2024-11-14 NOTE — TELEPHONE ENCOUNTER
Routing to  to assist with scheduling an in clinic follow up with Anne. Please call letty Blanchard at [phone] for scheduling.    Medication refill information reviewed.     Due date for fentaNYL (DURAGESIC) 12 mcg/hr 72 hr patch  is 11/19/24     Prescriptions prepped for review.     Will route to provider.

## 2024-11-14 NOTE — TELEPHONE ENCOUNTER
Spoke to patients son Santo, Osvaldo is transitioning care to [place] where he is living, he will be going into their memory care unit.  He will no longer be seeing Anne.

## 2024-11-14 NOTE — TELEPHONE ENCOUNTER
The patient is going to be transitioning care to a new Dr at his LTC facility but that transition has not happened yet and the initial meeting with the new provider is set up for next week. The patient will need 1 more refill.     Medication refill information reviewed.      Due date for fentaNYL (DURAGESIC) 12 mcg/hr 72 hr patch  is 11/19/24      Prescriptions prepped for review.      Will route to provider.

## 2024-11-15 RX ORDER — FENTANYL 12.5 UG/1
1 PATCH TRANSDERMAL
Qty: 10 PATCH | Refills: 0 | Status: SHIPPED | OUTPATIENT
Start: 2024-11-15

## 2024-11-15 NOTE — TELEPHONE ENCOUNTER
Sent in a refill for two boxes.   ALPESH [place], NP-C  [facility] [place]

## 2024-11-19 DIAGNOSIS — G30.9 MAJOR NEUROCOGNITIVE DISORDER DUE TO ALZHEIMER'S DISEASE (H): ICD-10-CM

## 2024-11-19 DIAGNOSIS — F02.80 MAJOR NEUROCOGNITIVE DISORDER DUE TO ALZHEIMER'S DISEASE (H): ICD-10-CM

## 2024-11-19 DIAGNOSIS — I25.718 CORONARY ARTERY DISEASE OF AUTOLOGOUS VEIN BYPASS GRAFT WITH STABLE ANGINA PECTORIS (H): ICD-10-CM

## 2024-11-19 RX ORDER — OLANZAPINE 2.5 MG/1
2.5 TABLET, FILM COATED ORAL AT BEDTIME
Qty: 90 TABLET | Refills: 1 | Status: SHIPPED | OUTPATIENT
Start: 2024-11-19

## 2024-11-19 RX ORDER — OLMESARTAN MEDOXOMIL 20 MG/1
TABLET ORAL
Qty: 90 TABLET | Refills: 0 | Status: SHIPPED | OUTPATIENT
Start: 2024-11-19

## 2024-11-19 NOTE — TELEPHONE ENCOUNTER
Refill request for: OLANZapine (ZYPREXA) 2.5 MG tablet    Directions: Take 1 tablet (2.5 mg) by mouth at bedtime     LOV: 7/1/24  NOV: 1/20/25    90 day supply with 0 refills Medication T'd for review and signature    Sharona Herrera MA

## 2024-11-19 NOTE — TELEPHONE ENCOUNTER
Requested Prescriptions   Pending Prescriptions Disp Refills    olmesartan (BENICAR) 20 MG tablet [Pharmacy Med Name: OLMESARTAN 20MG TABLET] 90 tablet 0     Sig: TAKE 1 TAB BY MOUTH ONCE DAILY       Angiotensin-II Receptors Failed - 11/19/2024 10:26 AM        Failed - Most recent blood pressure under 140/90 in past 12 months     BP Readings from Last 3 Encounters:   09/25/24 (!) 167/76   09/03/24 112/62   08/14/24 129/58       No data recorded            Failed - Recent (12 mo) or future (90days) visit within the authorizing provider's specialty     The patient must have completed an in-person or virtual visit within the past 12 months or has a future visit scheduled within the next 90 days with the authorizing provider s specialty.  Urgent care and e-visits do not qualify as an office visit for this protocol.          Passed - Medication is active on med list        Passed - Has GFR on file in past 12 months and most recent value is normal        Passed - Medication indicated for associated diagnosis     The medication is prescribed for one or more of the following conditions:    Chronic Kidney Disease (CDK)    Heart Failure (HF)    Diabetes, Nephropathy   Hypertension    Coronary Artery Disease (CAD)   Raynaud's Disease   Ischemic cardiomyopathy   Cardiomyopathy   Pulmonary Hypertension          Passed - Patient is age 18 or older        Passed - Normal serum potassium on file in past 12 months     Recent Labs   Lab Test 09/25/24  1716   POTASSIUM 4.0

## 2024-12-22 DIAGNOSIS — B37.2 CANDIDIASIS OF SKIN: Primary | ICD-10-CM

## 2024-12-22 RX ORDER — NYSTATIN 100000 [USP'U]/G
POWDER TOPICAL
Qty: 60 G | Refills: 2 | Status: SHIPPED | OUTPATIENT
Start: 2024-12-22

## 2024-12-30 ENCOUNTER — LAB REQUISITION (OUTPATIENT)
Dept: LAB | Facility: CLINIC | Age: 84
End: 2024-12-30
Payer: MEDICARE

## 2024-12-30 DIAGNOSIS — J09.X2 INFLUENZA DUE TO IDENTIFIED NOVEL INFLUENZA A VIRUS WITH OTHER RESPIRATORY MANIFESTATIONS: ICD-10-CM

## 2024-12-30 DIAGNOSIS — Z03.818 ENCOUNTER FOR OBSERVATION FOR SUSPECTED EXPOSURE TO OTHER BIOLOGICAL AGENTS RULED OUT: ICD-10-CM

## 2024-12-30 DIAGNOSIS — B97.4 RESPIRATORY SYNCYTIAL VIRUS AS THE CAUSE OF DISEASES CLASSIFIED ELSEWHERE: ICD-10-CM

## 2024-12-30 LAB
FLUAV RNA SPEC QL NAA+PROBE: NEGATIVE
FLUBV RNA RESP QL NAA+PROBE: NEGATIVE
RSV RNA SPEC NAA+PROBE: NEGATIVE
SARS-COV-2 RNA RESP QL NAA+PROBE: NEGATIVE

## 2024-12-30 PROCEDURE — 87637 SARSCOV2&INF A&B&RSV AMP PRB: CPT | Mod: ORL | Performed by: NURSE PRACTITIONER

## 2025-01-08 ENCOUNTER — PATIENT OUTREACH (OUTPATIENT)
Dept: CARE COORDINATION | Facility: CLINIC | Age: 85
End: 2025-01-08
Payer: MEDICARE

## 2025-01-16 NOTE — NURSING NOTE
Chief Complaint   Patient presents with     RECHECK     Memory complaints     Dayan Soriano MA on 5/24/2022 at 12:36 PM     Yes

## (undated) DEVICE — FORCEP BIOPSY 2.3MM DISP COATED 000388

## (undated) DEVICE — SUCTION MANIFOLD NEPTUNE 2 SYS 1 PORT 702-025-000

## (undated) DEVICE — TUBING SUCTION MEDI-VAC 1/4"X20' N620A - HE

## (undated) DEVICE — SOL WATER IRRIG 1000ML BOTTLE 2F7114

## (undated) DEVICE — NDL SCLEROTHERAPY 25GA CARR-LOCK  00711811

## (undated) DEVICE — SNARE OVAL SMALL DISP 100600

## (undated) RX ORDER — PROPOFOL 10 MG/ML
INJECTION, EMULSION INTRAVENOUS
Status: DISPENSED
Start: 2022-07-20

## (undated) RX ORDER — LIDOCAINE HYDROCHLORIDE 10 MG/ML
INJECTION, SOLUTION EPIDURAL; INFILTRATION; INTRACAUDAL; PERINEURAL
Status: DISPENSED
Start: 2022-07-20

## (undated) RX ORDER — EPHEDRINE SULFATE 50 MG/ML
INJECTION, SOLUTION INTRAMUSCULAR; INTRAVENOUS; SUBCUTANEOUS
Status: DISPENSED
Start: 2022-07-20

## (undated) RX ORDER — ONDANSETRON 2 MG/ML
INJECTION INTRAMUSCULAR; INTRAVENOUS
Status: DISPENSED
Start: 2022-07-20

## (undated) RX ORDER — DEXAMETHASONE SODIUM PHOSPHATE 10 MG/ML
INJECTION INTRAMUSCULAR; INTRAVENOUS
Status: DISPENSED
Start: 2022-07-20